# Patient Record
Sex: FEMALE | Race: OTHER | NOT HISPANIC OR LATINO | URBAN - METROPOLITAN AREA
[De-identification: names, ages, dates, MRNs, and addresses within clinical notes are randomized per-mention and may not be internally consistent; named-entity substitution may affect disease eponyms.]

---

## 2022-11-18 ENCOUNTER — INPATIENT (INPATIENT)
Facility: HOSPITAL | Age: 67
LOS: 10 days | Discharge: NURSING, REHAB OR LONG-TERM CARE | DRG: 23 | End: 2022-11-29
Attending: PSYCHIATRY & NEUROLOGY | Admitting: NEUROLOGICAL SURGERY
Payer: COMMERCIAL

## 2022-11-18 ENCOUNTER — EMERGENCY (EMERGENCY)
Facility: HOSPITAL | Age: 67
LOS: 1 days | Discharge: ANOTHER HOSPITAL | End: 2022-11-18
Attending: STUDENT IN AN ORGANIZED HEALTH CARE EDUCATION/TRAINING PROGRAM
Payer: MEDICAID

## 2022-11-18 VITALS
TEMPERATURE: 98 F | DIASTOLIC BLOOD PRESSURE: 81 MMHG | SYSTOLIC BLOOD PRESSURE: 180 MMHG | OXYGEN SATURATION: 98 % | HEART RATE: 90 BPM

## 2022-11-18 VITALS
TEMPERATURE: 98 F | OXYGEN SATURATION: 96 % | DIASTOLIC BLOOD PRESSURE: 67 MMHG | HEIGHT: 60 IN | WEIGHT: 116.84 LBS | HEART RATE: 62 BPM | RESPIRATION RATE: 16 BRPM | SYSTOLIC BLOOD PRESSURE: 211 MMHG

## 2022-11-18 VITALS
HEIGHT: 62 IN | TEMPERATURE: 99 F | HEART RATE: 100 BPM | SYSTOLIC BLOOD PRESSURE: 149 MMHG | RESPIRATION RATE: 16 BRPM | OXYGEN SATURATION: 97 % | DIASTOLIC BLOOD PRESSURE: 76 MMHG | WEIGHT: 115.96 LBS

## 2022-11-18 DIAGNOSIS — I61.9 NONTRAUMATIC INTRACEREBRAL HEMORRHAGE, UNSPECIFIED: ICD-10-CM

## 2022-11-18 LAB
ALBUMIN SERPL ELPH-MCNC: 4.1 G/DL — SIGNIFICANT CHANGE UP (ref 3.5–5)
ALBUMIN SERPL ELPH-MCNC: 4.8 G/DL — SIGNIFICANT CHANGE UP (ref 3.3–5)
ALP SERPL-CCNC: 80 U/L — SIGNIFICANT CHANGE UP (ref 40–120)
ALP SERPL-CCNC: 84 U/L — SIGNIFICANT CHANGE UP (ref 40–120)
ALT FLD-CCNC: 14 U/L — SIGNIFICANT CHANGE UP (ref 10–45)
ALT FLD-CCNC: 24 U/L DA — SIGNIFICANT CHANGE UP (ref 10–60)
AMPHET UR-MCNC: NEGATIVE — SIGNIFICANT CHANGE UP
ANION GAP SERPL CALC-SCNC: 12 MMOL/L — SIGNIFICANT CHANGE UP (ref 5–17)
ANION GAP SERPL CALC-SCNC: 13 MMOL/L — SIGNIFICANT CHANGE UP (ref 5–17)
ANION GAP SERPL CALC-SCNC: 9 MMOL/L — SIGNIFICANT CHANGE UP (ref 5–17)
APAP SERPL-MCNC: <2 UG/ML — LOW (ref 10–30)
APPEARANCE UR: CLEAR — SIGNIFICANT CHANGE UP
APTT BLD: 27.6 SEC — SIGNIFICANT CHANGE UP (ref 27.5–35.5)
AST SERPL-CCNC: 26 U/L — SIGNIFICANT CHANGE UP (ref 10–40)
AST SERPL-CCNC: 35 U/L — SIGNIFICANT CHANGE UP (ref 10–40)
BACTERIA # UR AUTO: ABNORMAL /HPF
BARBITURATES UR SCN-MCNC: NEGATIVE — SIGNIFICANT CHANGE UP
BASOPHILS # BLD AUTO: 0.01 K/UL — SIGNIFICANT CHANGE UP (ref 0–0.2)
BASOPHILS # BLD AUTO: 0.02 K/UL — SIGNIFICANT CHANGE UP (ref 0–0.2)
BASOPHILS NFR BLD AUTO: 0.1 % — SIGNIFICANT CHANGE UP (ref 0–2)
BASOPHILS NFR BLD AUTO: 0.3 % — SIGNIFICANT CHANGE UP (ref 0–2)
BENZODIAZ UR-MCNC: NEGATIVE — SIGNIFICANT CHANGE UP
BILIRUB SERPL-MCNC: 0.5 MG/DL — SIGNIFICANT CHANGE UP (ref 0.2–1.2)
BILIRUB SERPL-MCNC: 0.6 MG/DL — SIGNIFICANT CHANGE UP (ref 0.2–1.2)
BILIRUB UR-MCNC: NEGATIVE — SIGNIFICANT CHANGE UP
BLD GP AB SCN SERPL QL: NEGATIVE — SIGNIFICANT CHANGE UP
BUN SERPL-MCNC: 7 MG/DL — SIGNIFICANT CHANGE UP (ref 7–23)
BUN SERPL-MCNC: 7 MG/DL — SIGNIFICANT CHANGE UP (ref 7–23)
BUN SERPL-MCNC: 9 MG/DL — SIGNIFICANT CHANGE UP (ref 7–18)
CALCIUM SERPL-MCNC: 9.1 MG/DL — SIGNIFICANT CHANGE UP (ref 8.4–10.5)
CALCIUM SERPL-MCNC: 9.2 MG/DL — SIGNIFICANT CHANGE UP (ref 8.4–10.5)
CALCIUM SERPL-MCNC: 9.3 MG/DL — SIGNIFICANT CHANGE UP (ref 8.4–10.5)
CHLORIDE SERPL-SCNC: 101 MMOL/L — SIGNIFICANT CHANGE UP (ref 96–108)
CHLORIDE SERPL-SCNC: 104 MMOL/L — SIGNIFICANT CHANGE UP (ref 96–108)
CHLORIDE SERPL-SCNC: 98 MMOL/L — SIGNIFICANT CHANGE UP (ref 96–108)
CO2 SERPL-SCNC: 25 MMOL/L — SIGNIFICANT CHANGE UP (ref 22–31)
CO2 SERPL-SCNC: 27 MMOL/L — SIGNIFICANT CHANGE UP (ref 22–31)
CO2 SERPL-SCNC: 29 MMOL/L — SIGNIFICANT CHANGE UP (ref 22–31)
COCAINE METAB.OTHER UR-MCNC: NEGATIVE — SIGNIFICANT CHANGE UP
COLOR SPEC: YELLOW — SIGNIFICANT CHANGE UP
CREAT SERPL-MCNC: 0.55 MG/DL — SIGNIFICANT CHANGE UP (ref 0.5–1.3)
CREAT SERPL-MCNC: 0.57 MG/DL — SIGNIFICANT CHANGE UP (ref 0.5–1.3)
CREAT SERPL-MCNC: 0.74 MG/DL — SIGNIFICANT CHANGE UP (ref 0.5–1.3)
DIFF PNL FLD: ABNORMAL
EGFR: 101 ML/MIN/1.73M2 — SIGNIFICANT CHANGE UP
EGFR: 102 ML/MIN/1.73M2 — SIGNIFICANT CHANGE UP
EGFR: 90 ML/MIN/1.73M2 — SIGNIFICANT CHANGE UP
EOSINOPHIL # BLD AUTO: 0 K/UL — SIGNIFICANT CHANGE UP (ref 0–0.5)
EOSINOPHIL # BLD AUTO: 0 K/UL — SIGNIFICANT CHANGE UP (ref 0–0.5)
EOSINOPHIL NFR BLD AUTO: 0 % — SIGNIFICANT CHANGE UP (ref 0–6)
EOSINOPHIL NFR BLD AUTO: 0 % — SIGNIFICANT CHANGE UP (ref 0–6)
EPI CELLS # UR: ABNORMAL /HPF
ETHANOL SERPL-MCNC: <10 MG/DL — SIGNIFICANT CHANGE UP (ref 0–10)
ETHANOL SERPL-MCNC: <3 MG/DL — SIGNIFICANT CHANGE UP (ref 0–10)
GLUCOSE SERPL-MCNC: 110 MG/DL — HIGH (ref 70–99)
GLUCOSE SERPL-MCNC: 115 MG/DL — HIGH (ref 70–99)
GLUCOSE SERPL-MCNC: 123 MG/DL — HIGH (ref 70–99)
GLUCOSE UR QL: NEGATIVE — SIGNIFICANT CHANGE UP
HCT VFR BLD CALC: 44.5 % — SIGNIFICANT CHANGE UP (ref 34.5–45)
HCT VFR BLD CALC: 44.6 % — SIGNIFICANT CHANGE UP (ref 34.5–45)
HCT VFR BLD CALC: 44.8 % — SIGNIFICANT CHANGE UP (ref 34.5–45)
HGB BLD-MCNC: 15.2 G/DL — SIGNIFICANT CHANGE UP (ref 11.5–15.5)
HGB BLD-MCNC: 15.3 G/DL — SIGNIFICANT CHANGE UP (ref 11.5–15.5)
HGB BLD-MCNC: 15.6 G/DL — HIGH (ref 11.5–15.5)
IMM GRANULOCYTES NFR BLD AUTO: 0.3 % — SIGNIFICANT CHANGE UP (ref 0–0.9)
IMM GRANULOCYTES NFR BLD AUTO: 0.6 % — SIGNIFICANT CHANGE UP (ref 0–0.9)
INR BLD: 1.09 RATIO — SIGNIFICANT CHANGE UP (ref 0.88–1.16)
KETONES UR-MCNC: ABNORMAL
LEUKOCYTE ESTERASE UR-ACNC: NEGATIVE — SIGNIFICANT CHANGE UP
LYMPHOCYTES # BLD AUTO: 0.82 K/UL — LOW (ref 1–3.3)
LYMPHOCYTES # BLD AUTO: 1.27 K/UL — SIGNIFICANT CHANGE UP (ref 1–3.3)
LYMPHOCYTES # BLD AUTO: 11.8 % — LOW (ref 13–44)
LYMPHOCYTES # BLD AUTO: 20.3 % — SIGNIFICANT CHANGE UP (ref 13–44)
MAGNESIUM SERPL-MCNC: 2.3 MG/DL — SIGNIFICANT CHANGE UP (ref 1.6–2.6)
MCHC RBC-ENTMCNC: 31.9 PG — SIGNIFICANT CHANGE UP (ref 27–34)
MCHC RBC-ENTMCNC: 32.1 PG — SIGNIFICANT CHANGE UP (ref 27–34)
MCHC RBC-ENTMCNC: 32.6 PG — SIGNIFICANT CHANGE UP (ref 27–34)
MCHC RBC-ENTMCNC: 34.2 GM/DL — SIGNIFICANT CHANGE UP (ref 32–36)
MCHC RBC-ENTMCNC: 34.3 GM/DL — SIGNIFICANT CHANGE UP (ref 32–36)
MCHC RBC-ENTMCNC: 34.8 GM/DL — SIGNIFICANT CHANGE UP (ref 32–36)
MCV RBC AUTO: 93.1 FL — SIGNIFICANT CHANGE UP (ref 80–100)
MCV RBC AUTO: 93.5 FL — SIGNIFICANT CHANGE UP (ref 80–100)
MCV RBC AUTO: 93.9 FL — SIGNIFICANT CHANGE UP (ref 80–100)
METHADONE UR-MCNC: NEGATIVE — SIGNIFICANT CHANGE UP
MONOCYTES # BLD AUTO: 0.37 K/UL — SIGNIFICANT CHANGE UP (ref 0–0.9)
MONOCYTES # BLD AUTO: 0.37 K/UL — SIGNIFICANT CHANGE UP (ref 0–0.9)
MONOCYTES NFR BLD AUTO: 5.3 % — SIGNIFICANT CHANGE UP (ref 2–14)
MONOCYTES NFR BLD AUTO: 5.9 % — SIGNIFICANT CHANGE UP (ref 2–14)
NEUTROPHILS # BLD AUTO: 4.57 K/UL — SIGNIFICANT CHANGE UP (ref 1.8–7.4)
NEUTROPHILS # BLD AUTO: 5.7 K/UL — SIGNIFICANT CHANGE UP (ref 1.8–7.4)
NEUTROPHILS NFR BLD AUTO: 72.9 % — SIGNIFICANT CHANGE UP (ref 43–77)
NEUTROPHILS NFR BLD AUTO: 82.5 % — HIGH (ref 43–77)
NITRITE UR-MCNC: NEGATIVE — SIGNIFICANT CHANGE UP
NRBC # BLD: 0 /100 WBCS — SIGNIFICANT CHANGE UP (ref 0–0)
NT-PROBNP SERPL-SCNC: 375 PG/ML — HIGH (ref 0–125)
OPIATES UR-MCNC: NEGATIVE — SIGNIFICANT CHANGE UP
PCP SPEC-MCNC: SIGNIFICANT CHANGE UP
PCP UR-MCNC: NEGATIVE — SIGNIFICANT CHANGE UP
PH UR: 6.5 — SIGNIFICANT CHANGE UP (ref 5–8)
PHOSPHATE SERPL-MCNC: 3 MG/DL — SIGNIFICANT CHANGE UP (ref 2.5–4.5)
PLATELET # BLD AUTO: 212 K/UL — SIGNIFICANT CHANGE UP (ref 150–400)
PLATELET # BLD AUTO: 219 K/UL — SIGNIFICANT CHANGE UP (ref 150–400)
PLATELET # BLD AUTO: 223 K/UL — SIGNIFICANT CHANGE UP (ref 150–400)
POTASSIUM SERPL-MCNC: 3.5 MMOL/L — SIGNIFICANT CHANGE UP (ref 3.5–5.3)
POTASSIUM SERPL-MCNC: 3.6 MMOL/L — SIGNIFICANT CHANGE UP (ref 3.5–5.3)
POTASSIUM SERPL-MCNC: 4.2 MMOL/L — SIGNIFICANT CHANGE UP (ref 3.5–5.3)
POTASSIUM SERPL-SCNC: 3.5 MMOL/L — SIGNIFICANT CHANGE UP (ref 3.5–5.3)
POTASSIUM SERPL-SCNC: 3.6 MMOL/L — SIGNIFICANT CHANGE UP (ref 3.5–5.3)
POTASSIUM SERPL-SCNC: 4.2 MMOL/L — SIGNIFICANT CHANGE UP (ref 3.5–5.3)
PROT SERPL-MCNC: 8.1 G/DL — SIGNIFICANT CHANGE UP (ref 6–8.3)
PROT SERPL-MCNC: 8.1 G/DL — SIGNIFICANT CHANGE UP (ref 6–8.3)
PROT UR-MCNC: 15
PROTHROM AB SERPL-ACNC: 12.5 SEC — SIGNIFICANT CHANGE UP (ref 10.5–13.4)
RBC # BLD: 4.74 M/UL — SIGNIFICANT CHANGE UP (ref 3.8–5.2)
RBC # BLD: 4.79 M/UL — SIGNIFICANT CHANGE UP (ref 3.8–5.2)
RBC # BLD: 4.79 M/UL — SIGNIFICANT CHANGE UP (ref 3.8–5.2)
RBC # FLD: 11.6 % — SIGNIFICANT CHANGE UP (ref 10.3–14.5)
RBC # FLD: 11.7 % — SIGNIFICANT CHANGE UP (ref 10.3–14.5)
RBC # FLD: 11.8 % — SIGNIFICANT CHANGE UP (ref 10.3–14.5)
RBC CASTS # UR COMP ASSIST: SIGNIFICANT CHANGE UP /HPF (ref 0–2)
RH IG SCN BLD-IMP: POSITIVE — SIGNIFICANT CHANGE UP
SALICYLATES SERPL-MCNC: <1.7 MG/DL — LOW (ref 2.8–20)
SARS-COV-2 RNA SPEC QL NAA+PROBE: SIGNIFICANT CHANGE UP
SODIUM SERPL-SCNC: 136 MMOL/L — SIGNIFICANT CHANGE UP (ref 135–145)
SODIUM SERPL-SCNC: 139 MMOL/L — SIGNIFICANT CHANGE UP (ref 135–145)
SODIUM SERPL-SCNC: 143 MMOL/L — SIGNIFICANT CHANGE UP (ref 135–145)
SP GR SPEC: 1.01 — SIGNIFICANT CHANGE UP (ref 1.01–1.02)
THC UR QL: NEGATIVE — SIGNIFICANT CHANGE UP
TROPONIN I, HIGH SENSITIVITY RESULT: 58.5 NG/L — HIGH
TSH SERPL-MCNC: 2.87 UU/ML — SIGNIFICANT CHANGE UP (ref 0.34–4.82)
UROBILINOGEN FLD QL: NEGATIVE — SIGNIFICANT CHANGE UP
WBC # BLD: 6.27 K/UL — SIGNIFICANT CHANGE UP (ref 3.8–10.5)
WBC # BLD: 6.92 K/UL — SIGNIFICANT CHANGE UP (ref 3.8–10.5)
WBC # BLD: 7.52 K/UL — SIGNIFICANT CHANGE UP (ref 3.8–10.5)
WBC # FLD AUTO: 6.27 K/UL — SIGNIFICANT CHANGE UP (ref 3.8–10.5)
WBC # FLD AUTO: 6.92 K/UL — SIGNIFICANT CHANGE UP (ref 3.8–10.5)
WBC # FLD AUTO: 7.52 K/UL — SIGNIFICANT CHANGE UP (ref 3.8–10.5)
WBC UR QL: SIGNIFICANT CHANGE UP /HPF (ref 0–5)

## 2022-11-18 PROCEDURE — 93005 ELECTROCARDIOGRAM TRACING: CPT

## 2022-11-18 PROCEDURE — 70450 CT HEAD/BRAIN W/O DYE: CPT | Mod: 26,59

## 2022-11-18 PROCEDURE — 70498 CT ANGIOGRAPHY NECK: CPT | Mod: 26

## 2022-11-18 PROCEDURE — 99291 CRITICAL CARE FIRST HOUR: CPT | Mod: 25

## 2022-11-18 PROCEDURE — 83880 ASSAY OF NATRIURETIC PEPTIDE: CPT

## 2022-11-18 PROCEDURE — 87635 SARS-COV-2 COVID-19 AMP PRB: CPT

## 2022-11-18 PROCEDURE — 80053 COMPREHEN METABOLIC PANEL: CPT

## 2022-11-18 PROCEDURE — 81001 URINALYSIS AUTO W/SCOPE: CPT

## 2022-11-18 PROCEDURE — 70496 CT ANGIOGRAPHY HEAD: CPT | Mod: 26

## 2022-11-18 PROCEDURE — 96361 HYDRATE IV INFUSION ADD-ON: CPT

## 2022-11-18 PROCEDURE — 99292 CRITICAL CARE ADDL 30 MIN: CPT

## 2022-11-18 PROCEDURE — 36415 COLL VENOUS BLD VENIPUNCTURE: CPT

## 2022-11-18 PROCEDURE — 93010 ELECTROCARDIOGRAM REPORT: CPT

## 2022-11-18 PROCEDURE — 84443 ASSAY THYROID STIM HORMONE: CPT

## 2022-11-18 PROCEDURE — 99291 CRITICAL CARE FIRST HOUR: CPT

## 2022-11-18 PROCEDURE — 80307 DRUG TEST PRSMV CHEM ANLYZR: CPT

## 2022-11-18 PROCEDURE — 71045 X-RAY EXAM CHEST 1 VIEW: CPT | Mod: 26

## 2022-11-18 PROCEDURE — 70450 CT HEAD/BRAIN W/O DYE: CPT | Mod: 26,MA

## 2022-11-18 PROCEDURE — 96374 THER/PROPH/DIAG INJ IV PUSH: CPT

## 2022-11-18 PROCEDURE — 71045 X-RAY EXAM CHEST 1 VIEW: CPT

## 2022-11-18 PROCEDURE — 84484 ASSAY OF TROPONIN QUANT: CPT

## 2022-11-18 PROCEDURE — 85025 COMPLETE CBC W/AUTO DIFF WBC: CPT

## 2022-11-18 PROCEDURE — 87086 URINE CULTURE/COLONY COUNT: CPT

## 2022-11-18 PROCEDURE — 70450 CT HEAD/BRAIN W/O DYE: CPT | Mod: MA

## 2022-11-18 RX ORDER — CEFAZOLIN SODIUM 1 G
2000 VIAL (EA) INJECTION ONCE
Refills: 0 | Status: COMPLETED | OUTPATIENT
Start: 2022-11-18 | End: 2022-11-18

## 2022-11-18 RX ORDER — SODIUM CHLORIDE 9 MG/ML
500 INJECTION INTRAMUSCULAR; INTRAVENOUS; SUBCUTANEOUS ONCE
Refills: 0 | Status: COMPLETED | OUTPATIENT
Start: 2022-11-18 | End: 2022-11-18

## 2022-11-18 RX ORDER — FENTANYL CITRATE 50 UG/ML
100 INJECTION INTRAVENOUS ONCE
Refills: 0 | Status: COMPLETED | OUTPATIENT
Start: 2022-11-18 | End: 2022-11-18

## 2022-11-18 RX ORDER — POTASSIUM CHLORIDE 20 MEQ
10 PACKET (EA) ORAL
Refills: 0 | Status: COMPLETED | OUTPATIENT
Start: 2022-11-18 | End: 2022-11-19

## 2022-11-18 RX ORDER — LEVETIRACETAM 250 MG/1
500 TABLET, FILM COATED ORAL ONCE
Refills: 0 | Status: COMPLETED | OUTPATIENT
Start: 2022-11-18 | End: 2022-11-18

## 2022-11-18 RX ORDER — DEXMEDETOMIDINE HYDROCHLORIDE IN 0.9% SODIUM CHLORIDE 4 UG/ML
0.2 INJECTION INTRAVENOUS
Qty: 200 | Refills: 0 | Status: DISCONTINUED | OUTPATIENT
Start: 2022-11-18 | End: 2022-11-19

## 2022-11-18 RX ORDER — FENTANYL CITRATE 50 UG/ML
25 INJECTION INTRAVENOUS ONCE
Refills: 0 | Status: DISCONTINUED | OUTPATIENT
Start: 2022-11-18 | End: 2022-11-18

## 2022-11-18 RX ORDER — MIDAZOLAM HYDROCHLORIDE 1 MG/ML
2 INJECTION, SOLUTION INTRAMUSCULAR; INTRAVENOUS ONCE
Refills: 0 | Status: DISCONTINUED | OUTPATIENT
Start: 2022-11-18 | End: 2022-11-18

## 2022-11-18 RX ORDER — FENTANYL CITRATE 50 UG/ML
50 INJECTION INTRAVENOUS ONCE
Refills: 0 | Status: COMPLETED | OUTPATIENT
Start: 2022-11-18 | End: 2022-11-18

## 2022-11-18 RX ORDER — FENTANYL CITRATE 50 UG/ML
100 INJECTION INTRAVENOUS ONCE
Refills: 0 | Status: DISCONTINUED | OUTPATIENT
Start: 2022-11-18 | End: 2022-11-18

## 2022-11-18 RX ORDER — NICARDIPINE HYDROCHLORIDE 30 MG/1
5 CAPSULE, EXTENDED RELEASE ORAL
Qty: 40 | Refills: 0 | Status: DISCONTINUED | OUTPATIENT
Start: 2022-11-18 | End: 2022-11-21

## 2022-11-18 RX ORDER — SODIUM CHLORIDE 9 MG/ML
1000 INJECTION INTRAMUSCULAR; INTRAVENOUS; SUBCUTANEOUS
Refills: 0 | Status: DISCONTINUED | OUTPATIENT
Start: 2022-11-18 | End: 2022-11-18

## 2022-11-18 RX ORDER — SODIUM CHLORIDE 9 MG/ML
1000 INJECTION INTRAMUSCULAR; INTRAVENOUS; SUBCUTANEOUS
Refills: 0 | Status: DISCONTINUED | OUTPATIENT
Start: 2022-11-18 | End: 2022-11-19

## 2022-11-18 RX ORDER — NOREPINEPHRINE BITARTRATE/D5W 8 MG/250ML
0.03 PLASTIC BAG, INJECTION (ML) INTRAVENOUS
Qty: 8 | Refills: 0 | Status: DISCONTINUED | OUTPATIENT
Start: 2022-11-18 | End: 2022-11-19

## 2022-11-18 RX ORDER — SODIUM CHLORIDE 9 MG/ML
1000 INJECTION INTRAMUSCULAR; INTRAVENOUS; SUBCUTANEOUS ONCE
Refills: 0 | Status: COMPLETED | OUTPATIENT
Start: 2022-11-18 | End: 2022-11-18

## 2022-11-18 RX ORDER — CHLORHEXIDINE GLUCONATE 213 G/1000ML
1 SOLUTION TOPICAL
Refills: 0 | Status: DISCONTINUED | OUTPATIENT
Start: 2022-11-18 | End: 2022-11-25

## 2022-11-18 RX ADMIN — FENTANYL CITRATE 100 MICROGRAM(S): 50 INJECTION INTRAVENOUS at 19:24

## 2022-11-18 RX ADMIN — SODIUM CHLORIDE 1000 MILLILITER(S): 9 INJECTION INTRAMUSCULAR; INTRAVENOUS; SUBCUTANEOUS at 14:15

## 2022-11-18 RX ADMIN — NICARDIPINE HYDROCHLORIDE 25 MG/HR: 30 CAPSULE, EXTENDED RELEASE ORAL at 16:47

## 2022-11-18 RX ADMIN — LEVETIRACETAM 420 MILLIGRAM(S): 250 TABLET, FILM COATED ORAL at 16:50

## 2022-11-18 RX ADMIN — MIDAZOLAM HYDROCHLORIDE 2 MILLIGRAM(S): 1 INJECTION, SOLUTION INTRAMUSCULAR; INTRAVENOUS at 20:10

## 2022-11-18 RX ADMIN — FENTANYL CITRATE 25 MICROGRAM(S): 50 INJECTION INTRAVENOUS at 21:00

## 2022-11-18 RX ADMIN — NICARDIPINE HYDROCHLORIDE 25 MG/HR: 30 CAPSULE, EXTENDED RELEASE ORAL at 18:40

## 2022-11-18 RX ADMIN — NICARDIPINE HYDROCHLORIDE 25 MG/HR: 30 CAPSULE, EXTENDED RELEASE ORAL at 19:23

## 2022-11-18 RX ADMIN — SODIUM CHLORIDE 75 MILLILITER(S): 9 INJECTION INTRAMUSCULAR; INTRAVENOUS; SUBCUTANEOUS at 23:18

## 2022-11-18 RX ADMIN — Medication 100 MILLIGRAM(S): at 19:23

## 2022-11-18 RX ADMIN — LEVETIRACETAM 500 MILLIGRAM(S): 250 TABLET, FILM COATED ORAL at 17:05

## 2022-11-18 RX ADMIN — SODIUM CHLORIDE 1000 MILLILITER(S): 9 INJECTION INTRAMUSCULAR; INTRAVENOUS; SUBCUTANEOUS at 15:15

## 2022-11-18 RX ADMIN — Medication 2 MILLIGRAM(S): at 22:15

## 2022-11-18 RX ADMIN — SODIUM CHLORIDE 1000 MILLILITER(S): 9 INJECTION INTRAMUSCULAR; INTRAVENOUS; SUBCUTANEOUS at 22:40

## 2022-11-18 RX ADMIN — MIDAZOLAM HYDROCHLORIDE 2 MILLIGRAM(S): 1 INJECTION, SOLUTION INTRAMUSCULAR; INTRAVENOUS at 19:40

## 2022-11-18 NOTE — ED ADULT NURSE NOTE - OBJECTIVE STATEMENT
67y F bibEMS transferred from [facility]. Pt daughter at bedside. Pt A&Ox1. Pt daughter reports has chronic etoh history. Daughter reports pt was drinking Wednesday and sustained an unwitnessed fall yesterday. Pt daughter reports pt's altered mental status. Pt appears confused and [place] the [place]" [place]. Pt BP at [place] systolic in the 200s. Pt started on cardene drip. Pt presents to [facility] ED with cardene infusing 5mg/hr. PMH includes chronic etoh, HTN, HLD. Denies any complaints at this time. Comfort and safety maintained. Will cont to monitor.

## 2022-11-18 NOTE — ED ADULT NURSE NOTE - NSIMPLEMENTINTERV_GEN_ALL_ED
Implemented All Fall Risk Interventions:  [place] to call system. Call bell, personal items and telephone within reach. Instruct patient to call for assistance. Room bathroom lighting operational. Non-slip footwear when patient is off stretcher. Physically safe environment: no spills, clutter or unnecessary equipment. Stretcher in lowest position, wheels locked, appropriate side rails in place. Provide visual cue, wrist band, yellow gown, etc. Monitor gait and stability. Monitor for mental status changes and reorient to person, place, and time. Review medications for side effects contributing to fall risk. Reinforce activity limits and safety measures with patient and family.

## 2022-11-18 NOTE — ED PROVIDER NOTE - OBJECTIVE STATEMENT
67 year old female with PMHX of hypertension and hyperlipidemia brought into the ED by her daughter for evaluation of AMS since yesterday morning. Patient denies any symptoms, and reports that she is here for a checkup. Daughter reports that yesterday patient woke up and felt some dizziness and had one episode of vomiting. Patient then reportedly had a second episode of vomiting later in the day, and had a decrease in po intake. Per daughter, patient's  reported that patient sustained a fall yesterday although he is unsure of the mechanism of the fall. Patient describes her dizziness as an intermittent lightheadedness which lasts for a few minutes and is usually brought on by positional change. Daughter also reports that patient was unable to walk yesterday. Daughter denies any recent illness. Daughter additionally notes that within the last few years patient was thought to possibly have early onset Alzheimer's, however daughter reports that patient's mental status has been different from her baseline since yesterday morning. Patient and daughter deny all other acute complaints. NKDA.

## 2022-11-18 NOTE — ED PROVIDER NOTE - QTC
Impression: Tear film insufficiency of bilateral lacrimal glands - s/p LASIK OU Plan: Ed pt on likely etiology of EDUIN vs corneal erosion causing irritation few weeks ago. Recommend ATs before bed to prevent recurrence, ointment as needed. qtc 481 std in inf leads

## 2022-11-18 NOTE — ED ADULT NURSE REASSESSMENT NOTE - NS ED NURSE REASSESS COMMENT FT1
Pt taken to NSCU bed 01 w/ MD RAZO, ABIGAIL gabriel, RN, cardiac med box, pt on cardiac monitor, 2LNC, RN Emily at bedside to receive pt

## 2022-11-18 NOTE — H&P ADULT - HISTORY OF PRESENT ILLNESS
67F Hx HTN HLD, presented to  w/dizziness & AMS since AM of 11/17. Patient reportedly early dementia (AOx3 bl). No AC/AP. CTH w/cerebellar IPH with IVH in 4th/3rd vent, ventriculomegaly, decreased but patent prepontine cistern. Plt 219

## 2022-11-18 NOTE — ED ADULT NURSE REASSESSMENT NOTE - NS ED NURSE REASSESS COMMENT FT1
neuro MD Naranjo at bedside to place right side EVD. per MD Naranjo EVD is at 6.5 at the skin with adequate perfusion. patient to be taken to CT and NSCU

## 2022-11-18 NOTE — H&P ADULT - ATTENDING COMMENTS
Pt seen and examined.  Family at bedside  H & P as above  s/p Rt frontal EVD  Awke alert. follows commands well  some confusion as per family  ALARCON well    Pt is 48hrs s/p left cerebellar hemisphere hemorrhage with extesnion into ventricular syustem.  Stable exam    Cont' present management as per ICU and EVD

## 2022-11-18 NOTE — ED PROVIDER NOTE - PROGRESS NOTE DETAILS
nsg consulted nsg consulted recommending admission SBP goal 100-160, pt currently on nicardipine will continue

## 2022-11-18 NOTE — ED PROVIDER NOTE - CLINICAL SUMMARY MEDICAL DECISION MAKING FREE TEXT BOX
here w/ intraparenchymal hemorrhage, and intraventricular hemorrhage seen by NSG may require evd vs hemicrani plan for NSICU admission, SBP goals 120-160

## 2022-11-18 NOTE — ED PROVIDER NOTE - OBJECTIVE STATEMENT
67 F w/ hx of HTN, HLD, on asa but unknown if pt compliant w/ home medications, pt had a fall yesterday unable to provide history, pt states she was trying to catch a flight, she is accompanied by her daughter, who reports pt had a fall yesterday and hit the front of her head, was witnessed by  pt w/ no cp, no sob, +vomiting, pt brought to [place] and then was brought to [facility] for nsg management for acute parenchyma hemorrhage

## 2022-11-18 NOTE — PROGRESS NOTE ADULT - ASSESSMENT
ASSESSMENT/PLAN:    NEURO:  Activity: [] mobilize as tolerated [] Bedrest [] PT [] OT [] PMNR    PULM:    CV:  SBP goal    RENAL:  Fluids:    GI:  Diet:  GI prophylaxis [] not indicated [] PPI [] other:  Bowel regimen [] colace [] senna [] other:    ENDO:   Goal euglycemia (-180)    HEME/ONC:  VTE prophylaxis: [] SCDs [] chemoprophylaxis [] hold chemoprophylaxis due to: [] high risk of DVT/PE on admission due to:    ID:    MISC:    SOCIAL/FAMILY:  [] awaiting [] updated at bedside [] family meeting    CODE STATUS:  [] Full Code [] DNR [] DNI [] Palliative/Comfort Care    DISPOSITION:  [] ICU [] Stroke Unit [] Floor [] EMU [] RCU [] PCU    [] Patient is at high risk of neurologic deterioration/death due to:     Time seen:  Time spent: ___ [] critical care minutes    Contact: [phone] ASSESSMENT/PLAN: 66 y/o female presenting with cerebellar ICH (score 2). S/P EVD placement emergently.    NEURO:  Vitor Q1H  CTA H&N- with no evidence of vascular malformation per radiology  MRi brain with and w/o contrast  EVD at 10 and open, ICP <10  CIWA protocol as the patient may be withdrawing from EtOH- last drink was 11/16  Stroke work up - stroke neurology on board  Activity: [x] mobilize as tolerated [] Bedrest [] PT [] OT [] PMNR    PULM:  Saturating on 2 L of NC    CV:  SBP goal 100-150 mmHg  History of hypertension and dyslipidemia  Will obtain TTE Lipid levels    RENAL:  Fluids: IVF at 50 cc/hour  Na goal 135-145, Na 139    GI:  Diet: NPO until dysphagia   GI prophylaxis [] not indicated [x] PPI [] other:  Bowel regimen [x] Miralax [x] senna [] other:    ENDO:   Goal euglycemia (-180)  Will obtain A1c     HEME/ONC:  H/H stable  VTE prophylaxis: [x] SCDs [] chemoprophylaxis [x] hold chemoprophylaxis due to: recent heme [] high risk of DVT/PE on admission due to:    ID:  Afebrile, no leukocytosis    MISC:    SOCIAL/FAMILY:  [] awaiting [x] updated at bedside [] family meeting    CODE STATUS:  [x] Full Code [] DNR [] DNI [] Palliative/Comfort Care    DISPOSITION:  [x] ICU [] Stroke Unit [] Floor [] EMU [] RCU [] PCU    [x] Patient is at high risk of neurologic deterioration/death due to: ICH expansion, herniation      ASSESSMENT/PLAN: 66 y/o female presenting with cerebellar ICH (score 2). S/P EVD placement emergently.    NEURO:  Vitor Q1H  CTA H&N- with no evidence of vascular malformation per radiology  MRi brain with and w/o contrast  Crani watch  EVD at 10 and open, ICP <10  CIWA protocol as the patient may be withdrawing from EtOH- last drink was 11/16  Stroke work up - stroke neurology on board  Activity: [x] mobilize as tolerated [] Bedrest [] PT [] OT [] PMNR    PULM:  Saturating on 2 L of NC    CV:  SBP goal 100-150 mmHg  History of hypertension and dyslipidemia  Will obtain TTE Lipid levels    RENAL:  Fluids: IVF at 50 cc/hour  Na goal 135-145, Na 139    GI:  Diet: NPO until dysphagia   GI prophylaxis [] not indicated [x] PPI [] other:  Bowel regimen [x] Miralax [x] senna [] other:    ENDO:   Goal euglycemia (-180)  Will obtain A1c     HEME/ONC:  H/H stable  VTE prophylaxis: [x] SCDs [] chemoprophylaxis [x] hold chemoprophylaxis due to: recent heme [] high risk of DVT/PE on admission due to:    ID:  Afebrile, no leukocytosis    MISC:    SOCIAL/FAMILY:  [] awaiting [x] updated at bedside [] family meeting    CODE STATUS:  [x] Full Code [] DNR [] DNI [] Palliative/Comfort Care    DISPOSITION:  [x] ICU [] Stroke Unit [] Floor [] EMU [] RCU [] PCU    [x] Patient is at high risk of neurologic deterioration/death due to: ICH expansion, herniation

## 2022-11-18 NOTE — ED PROVIDER NOTE - PHYSICAL EXAMINATION
Head is normocephalic and atraumatic. No head tenderness or skull depression on palpation. No temporal cord-like sensation, increased warmth or tenderness. Pt is alert and oriented to name and place but unable to tell date, able to follow commands. No facial asymmetry. Pupils 5 mm equally round with PERRL, no nystagmus, EOM intact. Cranial nerves III-XII grossly intact. Coordination nose-to-finger intact. All limbs equally strong bilaterally 5/5. No pronator drift. No numbness or tingling sensation.  No spinal/paraspinal tenderness. Neck is non-tender, supple with full range of motion. No nuchal rigidity. No cerebral deficits.

## 2022-11-18 NOTE — PROGRESS NOTE ADULT - SUBJECTIVE AND OBJECTIVE BOX
SUMMARY: 66 y/o female with history of hypertension, dyslipidemia on ASA presents after a fall. Patient unable to provide history. Patient apparently had a witnessed fall with trauma to the front of the head.     HOSPITAL COURSE: Patient with Cerebellar ICH, ICH score 2.     24 HOUR EVENTS: Patient s/p EVD placement emergently in the ER and transferred to neuro icu for further management.     ADMISSION SCORES:   GCS: HH: MF: NIHSS: ICH Score: 2    SEDATION SCORES:  RASS: CAM-ICU:     REVIEW OF SYSTEMS: REVIEW OF SYSTEMS: [x] Unable to Assess due to neurologic exam   [ ] All ROS addressed below are non-contributory, except:  Neuro: [ ] Headache [ ] Back pain [ ] Numbness [ ] Weakness [ ] Ataxia [ ] Dizziness [ ] Aphasia [ ] Dysarthria [ ] Visual disturbance  Resp: [ ] Shortness of breath/dyspnea [ ] Orthopnea [ ] Cough  CV: [ ] Chest pain [ ] Palpitation [ ] Lightheadedness [ ] Syncope  Renal: [ ] Thirst [ ] Edema  GI: [ ] Nausea [ ] Emesis [ ] Abdominal pain [ ] Constipation [ ] Diarrhea  Hem: [ ] Hematemesis [ ] bBright red blood per rectum  ID: [ ] Fever [ ] Chills [ ] Dysuria  ENT: [ ] Rhinorrhea    ALLERGIES: Allergies    Allergy Status Unknown    Intolerances        VITALS/DATA/ORDERS: [] Reviewed    DEVICES:   [] Restraints [] PIVs [] ET tube [] central line [] PICC [] arterial line [] emmanuel [] NGT/OGT [] EVD [] LD [] TERESA/HMV [] LiCOX [] ICP monitor [] Trach [] PEG [] Chest Tube [] other:    EXAMINATION:  General: No acute distress  HEENT: Anicteric sclerae  Cardiac: [id number]  Lungs: Clear  Abdomen: Soft, non-tender, +BS  Extremities: No c/c/e  Skin/Incision Site: Clean, dry and intact  Neurologic: Awake, alert, fully oriented, follows commands, PERRL, VFFtc, EOMI, face symmetric, tongue midline, no drift, full strength SUMMARY: 68 y/o female with history of hypertension, dyslipidemia on ASA, new onset dementia, alcohol abuse (1/2-1 bottle of wine a day) presents after a fall. Patient unable to provide history. Patient apparently had an unwitnessed fall with trauma to the front of the head on 11/17. The next day the patient was found to be confused, at that time the patient was taken to [facility] where she was found to have an ICH and transferred [facility] for further management.    HOSPITAL COURSE: Patient with Cerebellar ICH, ICH score 2.     24 HOUR EVENTS: Patient s/p EVD placement emergently in the ER and transferred to neuro icu for further management. Patient s/p CTA head and neck to rule out vascular malformation. Patient was also placed on a cardene gtt in the ER.     ADMISSION SCORES:   GCS: 14 CH Score: 2    REVIEW OF SYSTEMS: REVIEW OF SYSTEMS: [x] Unable to Assess due to neurologic exam   [ ] All ROS addressed below are non-contributory, except:  Neuro: [ ] Headache [ ] Back pain [ ] Numbness [ ] Weakness [ ] Ataxia [ ] Dizziness [ ] Aphasia [ ] Dysarthria [ ] Visual disturbance  Resp: [ ] Shortness of breath/dyspnea [ ] Orthopnea [ ] Cough  CV: [ ] Chest pain [ ] Palpitation [ ] Lightheadedness [ ] Syncope  Renal: [ ] Thirst [ ] Edema  GI: [ ] Nausea [ ] Emesis [ ] Abdominal pain [ ] Constipation [ ] Diarrhea  Hem: [ ] Hematemesis [ ] Bright red blood per rectum  ID: [ ] Fever [ ] Chills [ ] Dysuria  ENT: [ ] Rhinorrhea    ALLERGIES: Allergies    Allergy Status Unknown    Intolerances    VITALS/DATA/ORDERS: [x] Reviewed    DEVICES:   [] Restraints [] PIVs [] ET tube [] central line [] PICC [] arterial line [] emmanuel [] NGT/OGT [] EVD [] LD [] TERESA/HMV [] LiCOX [] ICP monitor [] Trach [] PEG [] Chest Tube [] other:    EXAMINATION:  General: Agitated, non cooperative   HEENT: Anicteric sclerae  Cardiac: [id number]  Lungs: Clear  Abdomen: Soft, non-tender, +BS  Extremities: Tremulous   Skin/Incision Site: Clean, dry and intact  Neurologic: Awake, alert, oriented to self, not following commands, moving all extremities strongly.

## 2022-11-18 NOTE — ED PROVIDER NOTE - CLINICAL SUMMARY MEDICAL DECISION MAKING FREE TEXT BOX
67 year old female presents to the ED with vague symptoms of AMA since yesterday morning associated with dizziness and vomiting. Patient is alert and oriented x2, however is otherwise grossly neurologically intact. Unsteady gait during ambulation on exam. Will perform CT to assess for acute intracranial injury, check labs and urine, perform chest x-ray to assess for infection, administer IV fluids, and likely admit.

## 2022-11-18 NOTE — H&P ADULT - ASSESSMENT
Billyanastacio Diana  67F Hx HTN HLD, presented to  w/dizziness & AMS since AM of 11/17. Patient reportedly early dementia (AOx3 bl). No AC/AP. CTH w/cerebellar IPH with IVH in 4th/3rd vent, ventriculomegaly, decreased but patent prepontine cistern. Plt 219  Exam: AOx1, PERRL, EOMI, no facial, no drift, ALARCON 5/5, mild L dysmetria   -Coags  -EVD for decline in exam  -SOC watch  --160  -adm NSCU under Dr. Vines with [facility] neurochecks

## 2022-11-18 NOTE — ED PROVIDER NOTE - ATTENDING CONTRIBUTION TO CARE
67 F w/ hx of HTN, HLD, on asa but unknown if pt compliant w/ home medications, pt had a fall yesterday unable to provide history, pt states she was trying to catch a flight, she is accompanied by her daughter, who reports pt had a fall yesterday and hit the front of her head, was witnessed by  pt w/ no cp, no sob, +vomiting, pt brought to [place] and then was brought to [facility] for nsg management for acute parenchyma hemorrhage. on exam, Const: no acute distress, Well-developed, Eyes: no conjunctival injection and no scleral icterus ENMT: Moist mucus membranes, CVS: +S1/S2, radial pulse 2+ bilaterally tachycardic  RESP: Unlabored respiratory effort, Clear to auscultation bilaterally Back: bruising on the L upper shoulder GI: Nontender/Nondistended soft abdomen, no CVA tenderness MSK: Extremities w/o deformity or ttp, Psych: Awake, Alert, & Orientedx3;  Appropriate mood and affect, cooperative Neuro: 5/5 bilateral upper and lower extremity strength, intact sensation in the bilateral arms and legs to light touch, normal finger to nose bilaterally w/ no dysmetria, EOMI, no facial droop,   Plan for nsg consult   CTH performed at 4PM w/ Acute parenchymal hemorrhage in the left cerebellum measuring 2.6 x 2.3 x 2.3 cm. Intraventricular hemorrhage in the fourth, third, and bilateral lateral ventricles. Dilatation of bilateral temporal horns suggesting hydrocephalus. No midline shift or effacement of basal cisterns.  Keppra 500 mg given at 430 PM  Likely TBA for hemicrani watch, possible NSICU, 67 F  transfer from [facility] CTH performed at 4PM w/ Acute parenchymal hemorrhage in the left cerebellum measuring 2.6 x 2.3 x 2.3 cm. Intraventricular hemorrhage in the fourth, third, and bilateral lateral ventricles. Dilatation of bilateral temporal horns suggesting hydrocephalus. No midline shift or effacement of basal cisterns.  Keppra 500 mg given at 430 PM  Likely TBA for hemicrani watch, possible NSICU,

## 2022-11-18 NOTE — ED ADULT TRIAGE NOTE - CHIEF COMPLAINT QUOTE
C/o dizziness on/off x 2 days, vomiting since yesterday, couldn't get OOB by herself yesterday morning at 7am and was unable to walk by self,  feels pressure  in her head and was confused y/d.

## 2022-11-18 NOTE — ED PROVIDER NOTE - PHYSICAL EXAMINATION
Const: no acute distress, Well-developed, Eyes: no conjunctival injection and no scleral icterus ENMT: Moist mucus membranes, CVS: +S1/S2, radial pulse 2+ bilaterally tachycardic  RESP: Unlabored respiratory effort, Clear to auscultation bilaterally Back: bruising on the L upper shoulder GI: Nontender/Nondistended soft abdomen, no CVA tenderness MSK: Extremities w/o deformity or ttp, Psych: Awake, Alert, & Orientedx3;  Appropriate mood and affect, cooperative Neuro: 5/5 bilateral upper and lower extremity strength, intact sensation in the bilateral arms and legs to light touch, normal finger to nose bilaterally w/ no dysmetria, EOMI, no facial droop,

## 2022-11-18 NOTE — ED PROVIDER NOTE - PROGRESS NOTE DETAILS
CTH with cerebellar bleed.  Spoke to NSGY, will transfer to [facility]. Keppra and Cardene initiated - BP ok but cardene hanging at bedside in case of BP spike.

## 2022-11-19 LAB
A1C WITH ESTIMATED AVERAGE GLUCOSE RESULT: 5.6 % — SIGNIFICANT CHANGE UP (ref 4–5.6)
ALBUMIN SERPL ELPH-MCNC: 4 G/DL — SIGNIFICANT CHANGE UP (ref 3.3–5)
ALP SERPL-CCNC: 66 U/L — SIGNIFICANT CHANGE UP (ref 40–120)
ALT FLD-CCNC: 13 U/L — SIGNIFICANT CHANGE UP (ref 10–45)
AMPHET UR-MCNC: NEGATIVE — SIGNIFICANT CHANGE UP
ANION GAP SERPL CALC-SCNC: 9 MMOL/L — SIGNIFICANT CHANGE UP (ref 5–17)
AST SERPL-CCNC: 20 U/L — SIGNIFICANT CHANGE UP (ref 10–40)
BARBITURATES UR SCN-MCNC: NEGATIVE — SIGNIFICANT CHANGE UP
BENZODIAZ UR-MCNC: POSITIVE
BILIRUB DIRECT SERPL-MCNC: 0.1 MG/DL — SIGNIFICANT CHANGE UP (ref 0–0.3)
BILIRUB INDIRECT FLD-MCNC: 0.4 MG/DL — SIGNIFICANT CHANGE UP (ref 0.2–1)
BILIRUB SERPL-MCNC: 0.5 MG/DL — SIGNIFICANT CHANGE UP (ref 0.2–1.2)
BUN SERPL-MCNC: 7 MG/DL — SIGNIFICANT CHANGE UP (ref 7–23)
CALCIUM SERPL-MCNC: 8.1 MG/DL — LOW (ref 8.4–10.5)
CHLORIDE SERPL-SCNC: 104 MMOL/L — SIGNIFICANT CHANGE UP (ref 96–108)
CHOLEST SERPL-MCNC: 250 MG/DL — HIGH
CO2 SERPL-SCNC: 24 MMOL/L — SIGNIFICANT CHANGE UP (ref 22–31)
COCAINE METAB.OTHER UR-MCNC: NEGATIVE — SIGNIFICANT CHANGE UP
CREAT SERPL-MCNC: 0.55 MG/DL — SIGNIFICANT CHANGE UP (ref 0.5–1.3)
EGFR: 102 ML/MIN/1.73M2 — SIGNIFICANT CHANGE UP
ESTIMATED AVERAGE GLUCOSE: 114 MG/DL — SIGNIFICANT CHANGE UP (ref 68–114)
GLUCOSE SERPL-MCNC: 107 MG/DL — HIGH (ref 70–99)
HCT VFR BLD CALC: 35.1 % — SIGNIFICANT CHANGE UP (ref 34.5–45)
HCV AB S/CO SERPL IA: 0.15 S/CO — SIGNIFICANT CHANGE UP (ref 0–0.99)
HCV AB SERPL-IMP: SIGNIFICANT CHANGE UP
HDLC SERPL-MCNC: 64 MG/DL — SIGNIFICANT CHANGE UP
HGB BLD-MCNC: 12.5 G/DL — SIGNIFICANT CHANGE UP (ref 11.5–15.5)
LIPID PNL WITH DIRECT LDL SERPL: 163 MG/DL — HIGH
MAGNESIUM SERPL-MCNC: 2 MG/DL — SIGNIFICANT CHANGE UP (ref 1.6–2.6)
MCHC RBC-ENTMCNC: 32.9 PG — SIGNIFICANT CHANGE UP (ref 27–34)
MCHC RBC-ENTMCNC: 35.6 GM/DL — SIGNIFICANT CHANGE UP (ref 32–36)
MCV RBC AUTO: 92.4 FL — SIGNIFICANT CHANGE UP (ref 80–100)
METHADONE UR-MCNC: NEGATIVE — SIGNIFICANT CHANGE UP
NON HDL CHOLESTEROL: 187 MG/DL — HIGH
NRBC # BLD: 0 /100 WBCS — SIGNIFICANT CHANGE UP (ref 0–0)
OPIATES UR-MCNC: NEGATIVE — SIGNIFICANT CHANGE UP
OXYCODONE UR-MCNC: NEGATIVE — SIGNIFICANT CHANGE UP
PCP SPEC-MCNC: SIGNIFICANT CHANGE UP
PCP UR-MCNC: NEGATIVE — SIGNIFICANT CHANGE UP
PHOSPHATE SERPL-MCNC: 2.4 MG/DL — LOW (ref 2.5–4.5)
PLATELET # BLD AUTO: 189 K/UL — SIGNIFICANT CHANGE UP (ref 150–400)
POTASSIUM SERPL-MCNC: 3.3 MMOL/L — LOW (ref 3.5–5.3)
POTASSIUM SERPL-SCNC: 3.3 MMOL/L — LOW (ref 3.5–5.3)
PROT SERPL-MCNC: 6.7 G/DL — SIGNIFICANT CHANGE UP (ref 6–8.3)
RBC # BLD: 3.8 M/UL — SIGNIFICANT CHANGE UP (ref 3.8–5.2)
RBC # FLD: 11.6 % — SIGNIFICANT CHANGE UP (ref 10.3–14.5)
SODIUM SERPL-SCNC: 137 MMOL/L — SIGNIFICANT CHANGE UP (ref 135–145)
T3 SERPL-MCNC: 80 NG/DL — SIGNIFICANT CHANGE UP (ref 80–200)
T4 AB SER-ACNC: 6.2 UG/DL — SIGNIFICANT CHANGE UP (ref 4.6–12)
THC UR QL: NEGATIVE — SIGNIFICANT CHANGE UP
TRIGL SERPL-MCNC: 118 MG/DL — SIGNIFICANT CHANGE UP
TSH SERPL-MCNC: 2.29 UIU/ML — SIGNIFICANT CHANGE UP (ref 0.27–4.2)
WBC # BLD: 7.77 K/UL — SIGNIFICANT CHANGE UP (ref 3.8–10.5)
WBC # FLD AUTO: 7.77 K/UL — SIGNIFICANT CHANGE UP (ref 3.8–10.5)

## 2022-11-19 PROCEDURE — 99291 CRITICAL CARE FIRST HOUR: CPT

## 2022-11-19 PROCEDURE — 99223 1ST HOSP IP/OBS HIGH 75: CPT

## 2022-11-19 RX ORDER — MULTIVIT-MIN/FERROUS GLUCONATE 9 MG/15 ML
15 LIQUID (ML) ORAL DAILY
Refills: 0 | Status: DISCONTINUED | OUTPATIENT
Start: 2022-11-19 | End: 2022-11-21

## 2022-11-19 RX ORDER — NOREPINEPHRINE BITARTRATE/D5W 8 MG/250ML
0.03 PLASTIC BAG, INJECTION (ML) INTRAVENOUS
Qty: 8 | Refills: 0 | Status: DISCONTINUED | OUTPATIENT
Start: 2022-11-19 | End: 2022-11-19

## 2022-11-19 RX ORDER — POTASSIUM PHOSPHATE, MONOBASIC POTASSIUM PHOSPHATE, DIBASIC 236; 224 MG/ML; MG/ML
30 INJECTION, SOLUTION INTRAVENOUS ONCE
Refills: 0 | Status: COMPLETED | OUTPATIENT
Start: 2022-11-19 | End: 2022-11-20

## 2022-11-19 RX ORDER — THIAMINE MONONITRATE (VIT B1) 100 MG
500 TABLET ORAL EVERY 8 HOURS
Refills: 0 | Status: COMPLETED | OUTPATIENT
Start: 2022-11-19 | End: 2022-11-22

## 2022-11-19 RX ORDER — THIAMINE MONONITRATE (VIT B1) 100 MG
500 TABLET ORAL ONCE
Refills: 0 | Status: COMPLETED | OUTPATIENT
Start: 2022-11-19 | End: 2022-11-19

## 2022-11-19 RX ORDER — DEXMEDETOMIDINE HYDROCHLORIDE IN 0.9% SODIUM CHLORIDE 4 UG/ML
0.2 INJECTION INTRAVENOUS
Qty: 200 | Refills: 0 | Status: DISCONTINUED | OUTPATIENT
Start: 2022-11-19 | End: 2022-11-20

## 2022-11-19 RX ORDER — POTASSIUM CHLORIDE 20 MEQ
10 PACKET (EA) ORAL
Refills: 0 | Status: COMPLETED | OUTPATIENT
Start: 2022-11-19 | End: 2022-11-20

## 2022-11-19 RX ORDER — THIAMINE MONONITRATE (VIT B1) 100 MG
100 TABLET ORAL ONCE
Refills: 0 | Status: DISCONTINUED | OUTPATIENT
Start: 2022-11-18 | End: 2022-11-19

## 2022-11-19 RX ORDER — POLYETHYLENE GLYCOL 3350 17 G/17G
17 POWDER, FOR SOLUTION ORAL
Refills: 0 | Status: DISCONTINUED | OUTPATIENT
Start: 2022-11-18 | End: 2022-11-21

## 2022-11-19 RX ORDER — SODIUM CHLORIDE 9 MG/ML
1000 INJECTION INTRAMUSCULAR; INTRAVENOUS; SUBCUTANEOUS ONCE
Refills: 0 | Status: COMPLETED | OUTPATIENT
Start: 2022-11-19 | End: 2022-11-19

## 2022-11-19 RX ORDER — LANOLIN ALCOHOL/MO/W.PET/CERES
5 CREAM (GRAM) TOPICAL AT BEDTIME
Refills: 0 | Status: DISCONTINUED | OUTPATIENT
Start: 2022-11-19 | End: 2022-11-21

## 2022-11-19 RX ORDER — FENTANYL CITRATE 50 UG/ML
12.5 INJECTION INTRAVENOUS ONCE
Refills: 0 | Status: DISCONTINUED | OUTPATIENT
Start: 2022-11-19 | End: 2022-11-19

## 2022-11-19 RX ORDER — SODIUM CHLORIDE 9 MG/ML
500 INJECTION INTRAMUSCULAR; INTRAVENOUS; SUBCUTANEOUS ONCE
Refills: 0 | Status: COMPLETED | OUTPATIENT
Start: 2022-11-19 | End: 2022-11-19

## 2022-11-19 RX ORDER — CAPTOPRIL 12.5 MG/1
25 TABLET ORAL EVERY 8 HOURS
Refills: 0 | Status: DISCONTINUED | OUTPATIENT
Start: 2022-11-19 | End: 2022-11-21

## 2022-11-19 RX ORDER — PHENOBARBITAL 60 MG
130 TABLET ORAL ONCE
Refills: 0 | Status: DISCONTINUED | OUTPATIENT
Start: 2022-11-19 | End: 2022-11-19

## 2022-11-19 RX ORDER — FOLIC ACID 0.8 MG
1 TABLET ORAL DAILY
Refills: 0 | Status: DISCONTINUED | OUTPATIENT
Start: 2022-11-18 | End: 2022-11-21

## 2022-11-19 RX ORDER — ACETAMINOPHEN 500 MG
1000 TABLET ORAL ONCE
Refills: 0 | Status: DISCONTINUED | OUTPATIENT
Start: 2022-11-19 | End: 2022-11-19

## 2022-11-19 RX ORDER — ACETAMINOPHEN 500 MG
1000 TABLET ORAL ONCE
Refills: 0 | Status: COMPLETED | OUTPATIENT
Start: 2022-11-19 | End: 2022-11-19

## 2022-11-19 RX ORDER — PHENOBARBITAL 60 MG
260 TABLET ORAL ONCE
Refills: 0 | Status: DISCONTINUED | OUTPATIENT
Start: 2022-11-19 | End: 2022-11-19

## 2022-11-19 RX ORDER — SENNA PLUS 8.6 MG/1
2 TABLET ORAL AT BEDTIME
Refills: 0 | Status: DISCONTINUED | OUTPATIENT
Start: 2022-11-18 | End: 2022-11-21

## 2022-11-19 RX ADMIN — SODIUM CHLORIDE 1000 MILLILITER(S): 9 INJECTION INTRAMUSCULAR; INTRAVENOUS; SUBCUTANEOUS at 07:45

## 2022-11-19 RX ADMIN — Medication 105 MILLIGRAM(S): at 15:09

## 2022-11-19 RX ADMIN — FENTANYL CITRATE 12.5 MICROGRAM(S): 50 INJECTION INTRAVENOUS at 02:15

## 2022-11-19 RX ADMIN — Medication 408 MILLIGRAM(S): at 14:31

## 2022-11-19 RX ADMIN — Medication 130 MILLIGRAM(S): at 20:05

## 2022-11-19 RX ADMIN — Medication 105 MILLIGRAM(S): at 05:33

## 2022-11-19 RX ADMIN — Medication 15 MILLILITER(S): at 14:31

## 2022-11-19 RX ADMIN — Medication 100 MILLIEQUIVALENT(S): at 01:05

## 2022-11-19 RX ADMIN — FENTANYL CITRATE 12.5 MICROGRAM(S): 50 INJECTION INTRAVENOUS at 02:45

## 2022-11-19 RX ADMIN — SODIUM CHLORIDE 1000 MILLILITER(S): 9 INJECTION INTRAMUSCULAR; INTRAVENOUS; SUBCUTANEOUS at 19:15

## 2022-11-19 RX ADMIN — CAPTOPRIL 25 MILLIGRAM(S): 12.5 TABLET ORAL at 14:32

## 2022-11-19 RX ADMIN — CHLORHEXIDINE GLUCONATE 1 APPLICATION(S): 213 SOLUTION TOPICAL at 22:54

## 2022-11-19 RX ADMIN — Medication 100 MILLIEQUIVALENT(S): at 00:05

## 2022-11-19 RX ADMIN — Medication 1 MILLIGRAM(S): at 20:05

## 2022-11-19 RX ADMIN — Medication 130 MILLIGRAM(S): at 17:27

## 2022-11-19 RX ADMIN — Medication 100 MILLIEQUIVALENT(S): at 23:45

## 2022-11-19 RX ADMIN — Medication 1 MILLIGRAM(S): at 14:32

## 2022-11-19 RX ADMIN — Medication 100 MILLIEQUIVALENT(S): at 02:10

## 2022-11-19 RX ADMIN — Medication 105 MILLIGRAM(S): at 22:55

## 2022-11-19 NOTE — PROGRESS NOTE ADULT - SUBJECTIVE AND OBJECTIVE BOX
NSICU Progress Note:        24 HOUR EVENTS:   - admitted overnight, EVD placed for worsening mentation (AOx3 baseline>AOx1)  - EVD@10, no ICP issues; 75cc output since midnight            ICU Vital Signs Last 24 Hrs  T(C): 36.3 (19 Nov 2022 03:00), Max: 37.3 (18 Nov 2022 17:57)  T(F): 97.3 (19 Nov 2022 03:00), Max: 99.2 (18 Nov 2022 17:57)  HR: 68 (19 Nov 2022 05:45) (49 - 120)  BP: 146/93 (19 Nov 2022 02:00) (81/54 - 172/79)  BP(mean): 110 (19 Nov 2022 02:00) (61 - 111)  ABP: 128/49 (19 Nov 2022 05:45) (116/51 - 191/93)  ABP(mean): 74 (19 Nov 2022 05:45) (73 - 131)  RR: 14 (19 Nov 2022 05:45) (12 - 29)  SpO2: 97% (19 Nov 2022 05:45) (95% - 100%)    O2 Parameters below as of 19 Nov 2022 05:00  Patient On (Oxygen Delivery Method): room air           11-18 @ 07:01  -  11-19 @ 06:01  --------------------------------------------------------  IN: 1740.5 mL / OUT: 1027 mL / NET: 713.5 mL                             15.6   7.52  )-----------( 223      ( 18 Nov 2022 22:37 )             44.8    11-18    139  |  101  |  7   ----------------------------<  123<H>  3.5   |  25  |  0.55    Ca    9.1      18 Nov 2022 22:37  Phos  3.0     11-18  Mg     2.3     11-18    TPro  6.7  /  Alb  4.0  /  TBili  0.5  /  DBili  0.1  /  AST  20  /  ALT  13  /  AlkPhos  66  11-19              MEDICATIONS:  chlorhexidine 4% Liquid 1 Application(s) Topical <User Schedule>  folic acid 1 milliGRAM(s) Oral daily  multivitamin 1 Tablet(s) Oral daily  niCARdipine Infusion 5 mG/Hr IV Continuous <Continuous>  norepinephrine Infusion 0.03 MICROgram(s)/kG/Min IV Continuous <Continuous>  polyethylene glycol 3350 17 Gram(s) Oral two times a day  senna 2 Tablet(s) Oral at bedtime  sodium chloride 0.9%. 1000 milliLiter(s) IV Continuous <Continuous>            PHYSICAL EXAM:    General: calm  CVS: RRR  Pulm: CTAB  GI: Soft, NTND  Extremities: No LE Edema  Neuro: awake, oriented x1 (name, [place] and home), EOMI, no nystagmus, no facial, 5/5 throughout, no dysmetria                 NSICU Progress Note:        24 HOUR EVENTS:   - admitted overnight, EVD placed for worsening mentation (AOx3 baseline>AOx1)  - EVD@10, no ICP issues            ICU Vital Signs Last 24 Hrs  T(C): 37.2 (19 Nov 2022 15:00), Max: 37.2 (19 Nov 2022 11:00)  T(F): 99 (19 Nov 2022 15:00), Max: 99 (19 Nov 2022 11:00)  HR: 106 (19 Nov 2022 20:15) (47 - 112)  BP: 146/93 (19 Nov 2022 02:00) (81/54 - 172/79)  BP(mean): 110 (19 Nov 2022 02:00) (61 - 110)  ABP: 163/84 (19 Nov 2022 20:15) (88/49 - 191/93)  ABP(mean): 110 (19 Nov 2022 20:15) (61 - 131)  RR: 25 (19 Nov 2022 20:15) (12 - 29)  SpO2: 100% (19 Nov 2022 20:15) (94% - 100%)    O2 Parameters below as of 19 Nov 2022 19:00  Patient On (Oxygen Delivery Method): room air    MEDICATIONS:  chlorhexidine 4% Liquid 1 Application(s) Topical <User Schedule>  folic acid 1 milliGRAM(s) Oral daily  multivitamin 1 Tablet(s) Oral daily  niCARdipine Infusion 5 mG/Hr IV Continuous <Continuous>  norepinephrine Infusion 0.03 MICROgram(s)/kG/Min IV Continuous <Continuous>  polyethylene glycol 3350 17 Gram(s) Oral two times a day  senna 2 Tablet(s) Oral at bedtime  sodium chloride 0.9%. 1000 milliLiter(s) IV Continuous <Continuous>    Labs Reviewed    PHYSICAL EXAM:    General: calm  CVS: RRR  Pulm: CTAB  GI: Soft, NTND  Extremities: No LE Edema  Neuro: awake, agitated, oriented x0-1 (fluctuates), Non-cooperative, EOMI, no facial,  ALARCON spont/AG

## 2022-11-19 NOTE — SWALLOW BEDSIDE ASSESSMENT ADULT - SWALLOW EVAL: DIAGNOSIS
Pt presents with an oropharyngeal swallow sequence that appears WFL to tolerate a regular texture diet with no overt s/s of laryngeal penetration or aspiration. Given Pt's intermittent drowsiness, recommend start with somewhat softer foods, and will f/u for upgrade pending diet tolerance and improved overall alertness.

## 2022-11-19 NOTE — SWALLOW BEDSIDE ASSESSMENT ADULT - H & P REVIEW
67F Hx HTN HLD, presented to  w/dizziness & AMS since AM of 11/17. Patient reportedly early dementia (AOx3 bl). No AC/AP. CTH w/cerebellar IPH with IVH in 4th/3rd vent, ventriculomegaly, decreased but patent prepontine cistern. Plt 219. Exam: AOx1, PERRL, EOMI, no facial, no drift, ALARCON 5/5, mild L dysmetria./yes

## 2022-11-19 NOTE — SWALLOW BEDSIDE ASSESSMENT ADULT - SLP PERTINENT HISTORY OF CURRENT PROBLEM
CTH found to ahve cerebellar IPH with IVH and casting of 4th/3rd vent, ventriculomegaly, CTA without evidence of vascular malformation, noted to have change in mental status and urgent EVD was placed in ED (AOx3>1), admitted for further management.

## 2022-11-19 NOTE — CHART NOTE - NSCHARTNOTEFT_GEN_A_CORE
CAPRINI SCORE [CLOT] Score on Admission for     AGE RELATED RISK FACTORS                                                       MOBILITY RELATED FACTORS  [ ] Age 41-60 years                                            (1 Point)                  [ ] Bed rest                                                        (1 Point)  [x] Age 61-74 years                                           (2 Points)                 [ ] Plaster cast                                                   (2 Points)  [ ] Age > 75 years                                              (3 Points)                 [ ] Bed bound for more than 72 hours         (2 Points)    DISEASE RELATED RISK FACTORS                                               GENDER SPECIFIC FACTORS  [ ] Edema in the lower extremities                       (1 Point)           [ ] Pregnancy                                                          (1 Point)  [ ] Varicose veins                                               (1 Point)                  [ ] Post-partum < 6 weeks                                   (1 Point)             [ ] BMI > 25 Kg/m2                                            (1 Point)                  [ ] Hormonal therapy  or oral contraception   (1 Point)                 [ ] Sepsis (in the previous month)                        (1 Point)            [ ] History of pregnancy complications             (1 point)  [ ] Pneumonia or serious lung disease                                            [ ] Unexplained or recurrent               (1 Point)           (in the previous month)                               (1 Point)  [ ] Abnormal pulmonary function test                     (1 Point)                 SURGERY RELATED RISK FACTORS (include planned surgeries)  [ ] Acute myocardial infarction                              (1 Point)                 [ ]  Section                                             (1 Point)  [ ] Congestive heart failure (in the previous month)  (1 Point)        [ ] Minor surgery                                                  (1 Point)   [ ] Inflammatory bowel disease                             (1 Point)                 [ ] Arthroscopic surgery                                        (2 Points)  [ ] Central venous access                                      (2 Points)  [ ] History / presence of malignancy                   (2 points)   [ ] General surgery lasting more than 45 minutes   (2 Points)       [ ] Stroke (in the previous month)                          (5 Points)               [ ] Elective arthroplasty                                         (5 Points)                                                                                                                                               HEMATOLOGY RELATED FACTORS                                                 TRAUMA RELATED RISK FACTORS  [ ] Prior episodes of VTE                                     (3 Points)                [ ] Fracture of the hip, pelvis, or leg                       (5 Points)  [ ] Positive family history for VTE                         (3 Points)             [ ] Acute spinal cord injury (in the previous month)  (5 Points)  [ ] Prothrombin 02340 A                                     (3 Points)                [ ] Paralysis  (less than 1 month)                             (5 Points)  [ ] Factor V Leiden                                             (3 Points)                   [ ] Multiple Trauma within 1 month                        (5 Points)  [ ] Lupus anticoagulants                                     (3 Points)                                                           [ ] Anticardiolipin antibodies                               (3 Points)                                                       [ ] High homocysteine in the blood                      (3 Points)                                             [ ] Other congenital or acquired thrombophilia      (3 Points)                                                [ ] Heparin induced thrombocytopenia                  (3 Points)                                          Total Score [2]    Risk:  Very low 0   Low 1 to 2   Moderate 3 to 4   High =5       VTE Prophylaxis Recommendations:  [x] mechanical pneumatic compression devices                                      [ ] contraindicated: _____________________  [ ] chemoprophylaxis                                                                                    [ ] contraindicated: _____________________    **** HIGH LIKELIHOOD DVT PRESENT ON ADMISSION  [ ] (please order LE dopplers within 24 hours of admission)

## 2022-11-19 NOTE — PROGRESS NOTE ADULT - ASSESSMENT
HPI:  67F with hx of HTN, HLD, early dementia, etoh use disorder (last drink 11/16), with reported recent fall, who presented to [facility] for AMS, CTH found to ahve cerebellar IPH with IVH and casting of 4th/3rd vent, ventriculomegaly, CTA without evidence of vascular malformation, txe to [facility] and in ED noted to have change in mental status and urgent EVD was placed in ED (AOx3>1), admitted for further management.       NEURO:  - Neuorchecks Q1H  - CTA H&N- with no evidence of vascular malformation per radiology; CTH AM; stability scan comparred to OSH scan in PACS  - MRi brain with and w/o contrast  - SOCrani watch  - EVD at 10 and open, ICP <10  - CIWA protocol as the patient may be withdrawing from EtOH- last drink was 11/16; MVT; phenobarb 260 load x1 for tongue fasciculations in the s/o etoh withdrawal  - Stroke work up - stroke neurology on board      PULM:  - wean to RA as tolerated  - Aspiration precautions    CV:  - SBP goal 100-150 mmHg  - History of hypertension and dyslipidemia  - Will obtain TTE  - joy  - captopril 25q8h to wean off cardene    RENAL:  - Fluids: IVL  - Na goal 140-145    GI:  - Diet: dysphagia eval, then adat  - GI prophylaxis [] not indicated [x] PPI [] other:  - Bowel regimen [x] Miralax [x] senna [] other:    ENDO:   - Goal euglycemia (-180)  - A1c     HEME/ONC:  - H/H stable  - VTE prophylaxis: SCDs  - hold chemoprophylaxis due to fresh heme    ID:  - Afebrile, no leukocytosis     HPI:  67F with hx of HTN, HLD, early dementia, etoh use disorder (last drink 11/16), with reported recent fall, who presented to [facility] for AMS, CTH found to ahve cerebellar IPH with IVH and casting of 4th/3rd vent, ventriculomegaly, CTA without evidence of vascular malformation, txe to [facility] and in ED noted to have change in mental status and urgent EVD was placed in ED (AOx3>1), admitted for further management.       NEURO:  - Neuorchecks Q1H  - CTA H&N- with no evidence of vascular malformation per radiology; CTH AM; stability scan comparred to OSH scan in PACS  - MRi brain with and w/o contrast  - SOCrani watch  - EVD at 10 and open, ICP <10  - CIWA protocol as the patient may be withdrawing from EtOH- last drink was 11/16; MVT; phenobarb 260 load x1 for tongue fasciculations in the s/o etoh withdrawal  - Stroke work up - stroke neurology on board      PULM:  - RA  - Aspiration precautions  - IS    CV:  - SBP goal 100-150 mmHg  - History of hypertension and dyslipidemia  - Will obtain TTE  - joy  - captopril 25q8h    RENAL:  - Fluids: IVL  - Na goal 140-145    GI:  - Diet: Easy to chew  - GI prophylaxis [x] not indicated [] PPI [] other:  - Bowel regimen [x] Miralax [x] senna [] other:    ENDO:   - Goal euglycemia (-180)  - A1c 5.6    HEME/ONC:  - H/H stable  - VTE prophylaxis: SCDs  - hold chemoprophylaxis due to fresh heme, Consider DVT ppx, if stable CT AM    ID:  - Afebrile, no leukocytosis    45 critical care minutes spent with patients

## 2022-11-19 NOTE — PROGRESS NOTE ADULT - SUBJECTIVE AND OBJECTIVE BOX
NSICU Progress Note:        24 HOUR EVENTS:   - admitted overnight, EVD placed for worsening mentation (AOx3 baseline>AOx1)  - EVD@10, no ICP issues            ICU Vital Signs Last 24 Hrs  T(C): 36.3 (19 Nov 2022 03:00), Max: 37.3 (18 Nov 2022 17:57)  T(F): 97.3 (19 Nov 2022 03:00), Max: 99.2 (18 Nov 2022 17:57)  HR: 68 (19 Nov 2022 05:45) (49 - 120)  BP: 146/93 (19 Nov 2022 02:00) (81/54 - 172/79)  BP(mean): 110 (19 Nov 2022 02:00) (61 - 111)  ABP: 128/49 (19 Nov 2022 05:45) (116/51 - 191/93)  ABP(mean): 74 (19 Nov 2022 05:45) (73 - 131)  RR: 14 (19 Nov 2022 05:45) (12 - 29)  SpO2: 97% (19 Nov 2022 05:45) (95% - 100%)    O2 Parameters below as of 19 Nov 2022 05:00  Patient On (Oxygen Delivery Method): room air           11-18 @ 07:01  -  11-19 @ 06:01  --------------------------------------------------------  IN: 1740.5 mL / OUT: 1027 mL / NET: 713.5 mL                             15.6   7.52  )-----------( 223      ( 18 Nov 2022 22:37 )             44.8    11-18    139  |  101  |  7   ----------------------------<  123<H>  3.5   |  25  |  0.55    Ca    9.1      18 Nov 2022 22:37  Phos  3.0     11-18  Mg     2.3     11-18    TPro  6.7  /  Alb  4.0  /  TBili  0.5  /  DBili  0.1  /  AST  20  /  ALT  13  /  AlkPhos  66  11-19              MEDICATIONS:  chlorhexidine 4% Liquid 1 Application(s) Topical <User Schedule>  folic acid 1 milliGRAM(s) Oral daily  multivitamin 1 Tablet(s) Oral daily  niCARdipine Infusion 5 mG/Hr IV Continuous <Continuous>  norepinephrine Infusion 0.03 MICROgram(s)/kG/Min IV Continuous <Continuous>  polyethylene glycol 3350 17 Gram(s) Oral two times a day  senna 2 Tablet(s) Oral at bedtime  sodium chloride 0.9%. 1000 milliLiter(s) IV Continuous <Continuous>            PHYSICAL EXAM:    General: calm  CVS: RRR  Pulm: CTAB  GI: Soft, NTND  Extremities: No LE Edema  Neuro: Awake, alert, oriented to self, not following commands, moving all extremities strongly                 NSICU Progress Note:        24 HOUR EVENTS:   - admitted overnight, EVD placed for worsening mentation (AOx3 baseline>AOx1)  - EVD@10, no ICP issues; 75cc output since midnight            ICU Vital Signs Last 24 Hrs  T(C): 36.3 (19 Nov 2022 03:00), Max: 37.3 (18 Nov 2022 17:57)  T(F): 97.3 (19 Nov 2022 03:00), Max: 99.2 (18 Nov 2022 17:57)  HR: 68 (19 Nov 2022 05:45) (49 - 120)  BP: 146/93 (19 Nov 2022 02:00) (81/54 - 172/79)  BP(mean): 110 (19 Nov 2022 02:00) (61 - 111)  ABP: 128/49 (19 Nov 2022 05:45) (116/51 - 191/93)  ABP(mean): 74 (19 Nov 2022 05:45) (73 - 131)  RR: 14 (19 Nov 2022 05:45) (12 - 29)  SpO2: 97% (19 Nov 2022 05:45) (95% - 100%)    O2 Parameters below as of 19 Nov 2022 05:00  Patient On (Oxygen Delivery Method): room air           11-18 @ 07:01  -  11-19 @ 06:01  --------------------------------------------------------  IN: 1740.5 mL / OUT: 1027 mL / NET: 713.5 mL                             15.6   7.52  )-----------( 223      ( 18 Nov 2022 22:37 )             44.8    11-18    139  |  101  |  7   ----------------------------<  123<H>  3.5   |  25  |  0.55    Ca    9.1      18 Nov 2022 22:37  Phos  3.0     11-18  Mg     2.3     11-18    TPro  6.7  /  Alb  4.0  /  TBili  0.5  /  DBili  0.1  /  AST  20  /  ALT  13  /  AlkPhos  66  11-19              MEDICATIONS:  chlorhexidine 4% Liquid 1 Application(s) Topical <User Schedule>  folic acid 1 milliGRAM(s) Oral daily  multivitamin 1 Tablet(s) Oral daily  niCARdipine Infusion 5 mG/Hr IV Continuous <Continuous>  norepinephrine Infusion 0.03 MICROgram(s)/kG/Min IV Continuous <Continuous>  polyethylene glycol 3350 17 Gram(s) Oral two times a day  senna 2 Tablet(s) Oral at bedtime  sodium chloride 0.9%. 1000 milliLiter(s) IV Continuous <Continuous>            PHYSICAL EXAM:    General: calm  CVS: RRR  Pulm: CTAB  GI: Soft, NTND  Extremities: No LE Edema  Neuro: awake, oriented x1 (name, [place] and home), EOMI, no nystagmus, no facial, 5/5 throughout, no dysmetria

## 2022-11-19 NOTE — SWALLOW BEDSIDE ASSESSMENT ADULT - ASR SWALLOW ASPIRATION MONITOR
Monitor for s/s aspiration/laryngeal penetration. If noted:  D/C p.o. intake, provide non-oral nutrition/hydration/meds, and contact this service @ [phone]/change of breathing pattern/cough/gurgly voice/fever/pneumonia/throat clearing/upper respiratory infection

## 2022-11-19 NOTE — SWALLOW BEDSIDE ASSESSMENT ADULT - SLP GENERAL OBSERVATIONS
Pt seen at bedside, awake but drowsy, adequately alert with encouragement for participation in PO trials. Pt oriented to self only, verbally responsive, following most simple directions for the purposes of the exam.

## 2022-11-19 NOTE — PROGRESS NOTE ADULT - ASSESSMENT
HPI:  67F with hx of HTN, HLD, early dementia, etoh use disorder (last drink 11/16), with reported recent fall, who presented to [facility] for AMS, CTH found to ahve cerebellar IPH with IVH and casting of 4th/3rd vent, ventriculomegaly, CTA without evidence of vascular malformation, noted to have change in mental status and urgent EVD was placed in ED (AOx3>1), admitted for further management.       NEURO:  - Neuorchecks Q1H  - CTA H&N- with no evidence of vascular malformation per radiology  - MRi brain with and w/o contrast; consider angio pending mri  - SOCrani watch  - EVD at 10 and open, ICP <10  - CIWA protocol as the patient may be withdrawing from EtOH- last drink was 11/16; MVT  - Stroke work up - stroke neurology on board      PULM:  - 2L NC  - wean to RA as tolerated  - Aspiration precautions    CV:  - SBP goal 100-150 mmHg  - History of hypertension and dyslipidemia  - Will obtain TTE Lipid levels  - joy    RENAL:  - Fluids: IVF at 50 cc/hour  - Na goal 135-145, Na 139    GI:  - Diet: NPO pending posible intervention  - GI prophylaxis [] not indicated [x] PPI [] other:  - Bowel regimen [x] Miralax [x] senna [] other:    ENDO:   - Goal euglycemia (-180)  - A1c     HEME/ONC:  - H/H stable  - VTE prophylaxis: SCDs  - hold chemoprophylaxis due to fresh heme    ID:  - Afebrile, no leukocytosis     HPI:  67F with hx of HTN, HLD, early dementia, etoh use disorder (last drink 11/16), with reported recent fall, who presented to [facility] for AMS, CTH found to ahve cerebellar IPH with IVH and casting of 4th/3rd vent, ventriculomegaly, CTA without evidence of vascular malformation, txe to [facility] and in ED noted to have change in mental status and urgent EVD was placed in ED (AOx3>1), admitted for further management.       NEURO:  - Neuorchecks Q1H  - CTA H&N- with no evidence of vascular malformation per radiology; CTH AM; stability scan comparred to OSH scan in PACS  - MRi brain with and w/o contrast  - SOCrani watch  - EVD at 10 and open, ICP <10  - CIWA protocol as the patient may be withdrawing from EtOH- last drink was 11/16; MVT; phenobarb 260 load x1 for tongue fasciculations in the s/o etoh withdrawal  - Stroke work up - stroke neurology on board      PULM:  - wean to RA as tolerated  - Aspiration precautions    CV:  - SBP goal 100-150 mmHg  - History of hypertension and dyslipidemia  - Will obtain TTE  - joy  - captopril 25q8h to wean off cardene    RENAL:  - Fluids: IVL  - Na goal 140-145    GI:  - Diet: dysphagia eval, then adat  - GI prophylaxis [] not indicated [x] PPI [] other:  - Bowel regimen [x] Miralax [x] senna [] other:    ENDO:   - Goal euglycemia (-180)  - A1c     HEME/ONC:  - H/H stable  - VTE prophylaxis: SCDs  - hold chemoprophylaxis due to fresh heme    ID:  - Afebrile, no leukocytosis

## 2022-11-20 ENCOUNTER — TRANSCRIPTION ENCOUNTER (OUTPATIENT)
Age: 67
End: 2022-11-20

## 2022-11-20 LAB
ANION GAP SERPL CALC-SCNC: 12 MMOL/L — SIGNIFICANT CHANGE UP (ref 5–17)
BUN SERPL-MCNC: 6 MG/DL — LOW (ref 7–23)
CALCIUM SERPL-MCNC: 8.6 MG/DL — SIGNIFICANT CHANGE UP (ref 8.4–10.5)
CHLORIDE SERPL-SCNC: 101 MMOL/L — SIGNIFICANT CHANGE UP (ref 96–108)
CO2 SERPL-SCNC: 24 MMOL/L — SIGNIFICANT CHANGE UP (ref 22–31)
CREAT SERPL-MCNC: 0.44 MG/DL — LOW (ref 0.5–1.3)
CULTURE RESULTS: SIGNIFICANT CHANGE UP
EGFR: 107 ML/MIN/1.73M2 — SIGNIFICANT CHANGE UP
FOLATE SERPL-MCNC: >20 NG/ML — SIGNIFICANT CHANGE UP
GLUCOSE BLDC GLUCOMTR-MCNC: 175 MG/DL — HIGH (ref 70–99)
GLUCOSE BLDC GLUCOMTR-MCNC: 87 MG/DL — SIGNIFICANT CHANGE UP (ref 70–99)
GLUCOSE SERPL-MCNC: 92 MG/DL — SIGNIFICANT CHANGE UP (ref 70–99)
MAGNESIUM SERPL-MCNC: 2.1 MG/DL — SIGNIFICANT CHANGE UP (ref 1.6–2.6)
PHOSPHATE SERPL-MCNC: 2.9 MG/DL — SIGNIFICANT CHANGE UP (ref 2.5–4.5)
POTASSIUM SERPL-MCNC: 3.5 MMOL/L — SIGNIFICANT CHANGE UP (ref 3.5–5.3)
POTASSIUM SERPL-SCNC: 3.5 MMOL/L — SIGNIFICANT CHANGE UP (ref 3.5–5.3)
SODIUM SERPL-SCNC: 137 MMOL/L — SIGNIFICANT CHANGE UP (ref 135–145)
SPECIMEN SOURCE: SIGNIFICANT CHANGE UP
VIT B12 SERPL-MCNC: 295 PG/ML — SIGNIFICANT CHANGE UP (ref 232–1245)

## 2022-11-20 PROCEDURE — 99291 CRITICAL CARE FIRST HOUR: CPT

## 2022-11-20 PROCEDURE — 70553 MRI BRAIN STEM W/O & W/DYE: CPT | Mod: 26

## 2022-11-20 PROCEDURE — 93306 TTE W/DOPPLER COMPLETE: CPT | Mod: 26

## 2022-11-20 PROCEDURE — 70450 CT HEAD/BRAIN W/O DYE: CPT | Mod: 26

## 2022-11-20 RX ORDER — POTASSIUM CHLORIDE 20 MEQ
10 PACKET (EA) ORAL
Refills: 0 | Status: COMPLETED | OUTPATIENT
Start: 2022-11-20 | End: 2022-11-20

## 2022-11-20 RX ORDER — DEXTROSE 50 % IN WATER 50 %
25 SYRINGE (ML) INTRAVENOUS ONCE
Refills: 0 | Status: COMPLETED | OUTPATIENT
Start: 2022-11-20 | End: 2022-11-20

## 2022-11-20 RX ORDER — SODIUM CHLORIDE 9 MG/ML
1000 INJECTION INTRAMUSCULAR; INTRAVENOUS; SUBCUTANEOUS
Refills: 0 | Status: DISCONTINUED | OUTPATIENT
Start: 2022-11-20 | End: 2022-11-22

## 2022-11-20 RX ORDER — PREGABALIN 225 MG/1
1000 CAPSULE ORAL ONCE
Refills: 0 | Status: COMPLETED | OUTPATIENT
Start: 2022-11-20 | End: 2022-11-20

## 2022-11-20 RX ORDER — ENOXAPARIN SODIUM 100 MG/ML
40 INJECTION SUBCUTANEOUS
Refills: 0 | Status: DISCONTINUED | OUTPATIENT
Start: 2022-11-20 | End: 2022-11-23

## 2022-11-20 RX ADMIN — Medication 1 MILLIGRAM(S): at 12:15

## 2022-11-20 RX ADMIN — ENOXAPARIN SODIUM 40 MILLIGRAM(S): 100 INJECTION SUBCUTANEOUS at 18:09

## 2022-11-20 RX ADMIN — Medication 105 MILLIGRAM(S): at 06:10

## 2022-11-20 RX ADMIN — Medication 105 MILLIGRAM(S): at 13:43

## 2022-11-20 RX ADMIN — CHLORHEXIDINE GLUCONATE 1 APPLICATION(S): 213 SOLUTION TOPICAL at 21:50

## 2022-11-20 RX ADMIN — Medication 100 MILLIEQUIVALENT(S): at 18:09

## 2022-11-20 RX ADMIN — Medication 100 MILLIEQUIVALENT(S): at 16:00

## 2022-11-20 RX ADMIN — Medication 25 MILLILITER(S): at 21:35

## 2022-11-20 RX ADMIN — Medication 100 MILLIEQUIVALENT(S): at 01:52

## 2022-11-20 RX ADMIN — Medication 100 MILLIEQUIVALENT(S): at 17:00

## 2022-11-20 RX ADMIN — Medication 100 MILLIEQUIVALENT(S): at 00:55

## 2022-11-20 RX ADMIN — Medication 1 MILLIGRAM(S): at 08:05

## 2022-11-20 RX ADMIN — POTASSIUM PHOSPHATE, MONOBASIC POTASSIUM PHOSPHATE, DIBASIC 83.33 MILLIMOLE(S): 236; 224 INJECTION, SOLUTION INTRAVENOUS at 01:30

## 2022-11-20 RX ADMIN — Medication 105 MILLIGRAM(S): at 21:49

## 2022-11-20 NOTE — PHYSICAL THERAPY INITIAL EVALUATION ADULT - PERTINENT HX OF CURRENT PROBLEM, REHAB EVAL
Pt is a 67 y.o. F with hx of HTN, HLD, early dementia, etoh use disorder (last drink 11/16), with reported recent fall, who presented to [facility] for AMS, CTH found to ahve cerebellar IPH with IVH and casting of 4th/3rd vent, ventriculomegaly, CTA without evidence of vascular malformation, in ED noted to have change in mental status and urgent EVD was placed in ED (AOx3>1). CTH 11/20: Unchanged left cerebral hemorrhage with ventricular extension compared with 11/18/2022. Unchanged mild hydrocephalus with intraventricular drain.

## 2022-11-20 NOTE — PHYSICAL THERAPY INITIAL EVALUATION ADULT - ADDITIONAL COMMENTS
pt confused, as per daughter at b/s, resides in a PH with family, 10 stairs to enter, one flight up to bedroom, PTA, pt amb (I), (I) with ADLs.

## 2022-11-20 NOTE — PROGRESS NOTE ADULT - SUBJECTIVE AND OBJECTIVE BOX
NEUROCRITICAL CARE ATTENDING EVENING NOTE    BRIEF SUMMARY:  66yo woman adm for cerebellar ICH with IVH and hydrocephalus, requiring EVD placement and CSF diversion.       OVERNIGHT EVENTS:    VITALS/IMAGING/DATA/IVF FLUIDS/MEDICATIONS: [] Reviewed    ALLERGIES: Allergies    Allergy Status Unknown    Intolerances        EXAMINATION:  General: No acute distress  HEENT: Anicteric sclerae  Cardiac: [id number]  Lungs: Clear  Abdomen: Soft, non-tender, +BS  Extremities: No c/c/e  Skin/Incision Site: Clean, dry and intact  Neurologic: Awake, alert, fully oriented, follows commands, PERRL, VFFtc, EOMI, face symmetric, tongue midline, no drift, full strength    ASSESSMENT:    PLAN:  Feeding: [] diet [] tube feeds  Analgesia/Sedation:   Seizure prophylaxis: [] not indicated  Thromboprophylaxis: [] SCDs [] chemoprophylaxis [] hold chemoprophylaxis due to: [] high risk of DVT/PE on admission due to:  Head of Bed/Activity: [] 30 degrees [] mobilize as tolerated [] PT [] OT [] PMNR  Ulcer prophylaxis: [] not indicated [] PPI [] other:  Glucose Control: Goal -180 [] RISS [] other:    [] Patient critically ill due to:    Time Seen:  Time Spent: __ [] critical care minutes    Contact: [phone] NEUROCRITICAL CARE ATTENDING EVENING NOTE    BRIEF SUMMARY:  68yo woman adm for cerebellar ICH with IVH and hydrocephalus, requiring EVD placement and CSF diversion. c/b EtOH withdrawal requiring phenobarb and ativan PRN.     Ativan x3 throughout the day, lethargic now.     VITALS/IMAGING/DATA/IVF FLUIDS/MEDICATIONS: [x] Reviewed  MRI  Left cerebellar hemorrhage with intraventricular extension   and mild hydrocephalus. Right frontal ventricular catheter. Nonspecific   white matter changes likely representing nonacute ischemia. Differential   diagnosis for hemorrhage includes hypertension, multiple cavernomas and   amyloid angiopathy.      ALLERGIES: Allergies    Allergy Status Unknown    Intolerances      EXAMINATION:  General: No acute distress  HEENT: Anicteric sclerae  Cardiac: [id number]  Lungs: Clear  Abdomen: Soft, non-tender, +BS  Extremities: No c/c/e  Skin/Incision Site: Clean, dry and intact  Neurologic: lethargic, minimal EO to noxious, pupils 5mm briskly reactive b/l, no FC, ALARCON to stim   FS 87    ASSESSMENT: L cerebellar ICH with IVH    PLAN:  EVD in place [id number], monitor ICP, output  MRI as above, unchanged  CIWA protocol, vEEG x24hrs given fluctuating exam  MVI  delirium precaution  nicardipine gtt -160  unable to swallow due to mental status, PO meds not being given, if still lethargic in am, will need NGT for enteral access  Seizure prophylaxis: [x] not indicated but given EtOH, vEEG now   Thromboprophylaxis: [x] SCDs [x] chemoprophylaxis [] hold chemoprophylaxis due to: [] high risk of DVT/PE on admission due to:  Head of Bed/Activity: [x] 30 degrees [] mobilize as tolerated [] PT [] OT [] PMNR  Ulcer prophylaxis: [x] not indicated [] PPI [] other:  Glucose Control: Goal -180 87 now, give 1/2 amp D50, given NPO, recheck     [x] Patient critically ill due to: cerebellar ICH, IVH, hydrocephalus requiring CSF diversion   son/daughter updated at bedside     Contact: [phone]

## 2022-11-20 NOTE — PROGRESS NOTE ADULT - ASSESSMENT
HPI:  67F with hx of HTN, HLD, early dementia, etoh use disorder (last drink 11/16), with reported recent fall, who presented to [facility] for AMS, CTH found to ahve cerebellar IPH with IVH and casting of 4th/3rd vent, ventriculomegaly, CTA without evidence of vascular malformation, txe to [facility] and in ED noted to have change in mental status and urgent EVD was placed in ED (AOx3>1), admitted for further management.       NEURO:  - Neuorchecks Q1H  - CTA H&N- with no evidence of vascular malformation per radiology; CTH AM; stability scan comparred to OSH scan in PACS  - MRi brain with and w/o contrast  - SOCrani watch  - EVD at 10 and open, ICP <10  - CIWA protocol as the patient may be withdrawing from EtOH- last drink was 11/16; MVT; phenobarb 260 load x1 for tongue fasciculations in the s/o etoh withdrawal  - Stroke work up - stroke neurology on board      PULM:  - RA  - Aspiration precautions  - IS    CV:  - SBP goal 100-150 mmHg  - History of hypertension and dyslipidemia  - Will obtain TTE  - joy  - captopril 25q8h    RENAL:  - Fluids: IVL  - Na goal 140-145    GI:  - Diet: Easy to chew  - GI prophylaxis [x] not indicated [] PPI [] other:  - Bowel regimen [x] Miralax [x] senna [] other:    ENDO:   - Goal euglycemia (-180)  - A1c 5.6    HEME/ONC:  - H/H stable  - VTE prophylaxis: SCDs  - hold chemoprophylaxis due to fresh heme, Consider DVT ppx, if stable CT AM    ID:  - Afebrile, no leukocytosis    45 critical care minutes spent with patients HPI:  67F with hx of HTN, HLD, early dementia, etoh use disorder (last drink 11/16), with reported recent fall, who presented to [facility] for AMS, CTH found to ahve cerebellar IPH with IVH and casting of 4th/3rd vent, ventriculomegaly, CTA without evidence of vascular malformation, txe to [facility] and in ED noted to have change in mental status and urgent EVD was placed in ED (AOx3>1), admitted for further management.       NEURO:  - Neuorchecks Q1H  - CTA H&N- with no evidence of vascular malformation per radiology; CTH AM; stability scan comparred to OSH scan in PACS  - MRi brain with and w/o contrast  - SOCrani watch  - EVD at 10 and open, ICP <10  - CIWA protocol as the patient may be withdrawing from EtOH- last drink was 11/16; MVT; phenobarb 260 load x1 for tongue fasciculations in the s/o etoh withdrawal  - Stroke work up - stroke neurology on board  - Low B12- 295, supplemented with 1000 mcg of vitamin B12 x1, folate levels >2      PULM:  - RA  - Aspiration precautions  - IS    CV:  - SBP goal 100-150 mmHg  - History of hypertension and dyslipidemia  - Will obtain TTE  - joy  - captopril 25q8h    RENAL:  - Fluids: IVL  - Na goal 140-145    GI:  - Diet: Easy to chew  - GI prophylaxis [x] not indicated [] PPI [] other:  - Bowel regimen [x] Miralax [x] senna [] other:    ENDO:   - Goal euglycemia (-180)  - A1c 5.6    HEME/ONC:  - H/H stable  - VTE prophylaxis: SCDs  - hold chemoprophylaxis due to fresh heme, Consider DVT ppx, if stable CT AM    ID:  - Afebrile, no leukocytosis    45 critical care minutes spent with patients HPI:  67F with hx of HTN, HLD, early dementia, etoh use disorder (last drink 11/16), with reported recent fall, who presented to [facility] for AMS, CTH found to ahve cerebellar IPH with IVH and casting of 4th/3rd vent, ventriculomegaly, CTA without evidence of vascular malformation, txe to [facility] and in ED noted to have change in mental status and urgent EVD was placed in ED (AOx3>1), admitted for further management.     NEURO:  - Neuorchecks Q2H  - CTA H&N- with no evidence of vascular malformation per radiology; CTH AM; stability scan comparred to OSH scan in PACS  - MRi brain with and w/o contrast  - SOCrani watch  - EVD at 10 and open, ICP <10  - CIWA protocol as the patient may be withdrawing from EtOH- last drink was 11/16; MVT; phenobarb 260 load x1 for tongue fasciculations in the s/o etoh withdrawal  - Stroke work up - stroke neurology on board  - Low B12- 295, supplemented with 1000 mcg of vitamin B12 x1, folate levels >2    PULM:  - RA  - Aspiration precautions  - IS    CV:  - SBP goal 100-160 mmHg  - History of hypertension and dyslipidemia  - Will obtain TTE  - joy  - captopril 25q8h    RENAL:  - Fluids: IVL  - Na goal 140-145    GI:  - Diet: Easy to chew  - GI prophylaxis [x] not indicated [] PPI [] other:  - Bowel regimen [x] Miralax [x] senna [] other:  - LBM prior to admission     ENDO:   - Goal euglycemia (-180)  - A1c 5.6    HEME/ONC:  - H/H stable  - VTE prophylaxis: SCDs  - 11/20- Will start SQL    ID:  - Afebrile, no leukocytosis    45 critical care minutes spent with patients HPI:  67F with hx of HTN, HLD, early dementia, etoh use disorder (last drink 11/16), with reported recent fall, who presented to [facility] for AMS, CTH found to ahve cerebellar IPH with IVH and casting of 4th/3rd vent, ventriculomegaly, CTA without evidence of vascular malformation, txe to [facility] and in ED noted to have change in mental status and urgent EVD was placed in ED (AOx3>1), admitted for further management.     NEURO:  - Neuorchecks Q2H  - CTA H&N- with no evidence of vascular malformation per radiology; CTH AM; stability scan comparred to OSH scan in PACS  - MRi brain with and w/o contrast  - SOCrani watch  - EVD at 10 and open, ICP <10  - CIWA protocol as the patient may be withdrawing from EtOH- last drink was 11/16; MVT; phenobarb 260 load x1 for tongue fasciculations in the s/o etoh withdrawal  - Stroke work up - stroke neurology on board  - Low B12- 295, supplemented with 1000 mcg of vitamin B12 x1, folate levels >2    PULM:  - RA  - Aspiration precautions  - IS    CV:  - SBP goal 100-160 mmHg  - History of hypertension and dyslipidemia  - Will obtain TTE  - joy  - captopril 25q8h    RENAL:  - Fluids: IVL  - Na goal 140-145    GI:  - Diet: Easy to chew  - GI prophylaxis [x] not indicated [] PPI [] other:  - Bowel regimen [x] Miralax [x] senna [] other:  - LBM prior to admission     ENDO:   - Goal euglycemia (-180)  - A1c 5.6    HEME/ONC:  - H/H stable  - VTE prophylaxis: SCDs  - 11/20- Will start SQL    ID:  - Afebrile, no leukocytosis

## 2022-11-20 NOTE — DISCHARGE NOTE NURSING/CASE MANAGEMENT/SOCIAL WORK - PATIENT PORTAL LINK FT
You can access the FollowMyHealth Patient Portal offered by [facility] by registering at the following website: [email or web address]. By joining [facility]’s FollowMyHealth portal, you will also be able to view your health information using other applications (apps) compatible with our system.

## 2022-11-20 NOTE — DISCHARGE NOTE NURSING/CASE MANAGEMENT/SOCIAL WORK - NSDCVIVACCINE_GEN_ALL_CORE_FT
No Vaccines Administered. Pfizer-BioNTech Covid-19 Vaccine, Bivalent; 29-Nov-2022 11:57; Marlon Enciso (RN); [employer]; [id number] (Exp. Date: 12-Jan-2023); IntraMuscular; Deltoid Left.; 0.3 milliLiter(s);

## 2022-11-20 NOTE — PROGRESS NOTE ADULT - SUBJECTIVE AND OBJECTIVE BOX
NSICU Progress Note:        24 HOUR EVENTS:   - admitted overnight, EVD placed for worsening mentation (AOx3 baseline>AOx1)  - EVD@10, no ICP issues        Labs Reviewed    PHYSICAL EXAM:    General: calm  CVS: RRR  Pulm: CTAB  GI: Soft, NTND  Extremities: No LE Edema  Neuro: awake, agitated, oriented x0-1 (fluctuates), Non-cooperative, EOMI, no facial,  ALARCON spont/AG

## 2022-11-20 NOTE — PATIENT PROFILE ADULT - NSPRESCRALCFREQ_GEN_A_NUR
4 or more times a week answers from family members at bedside (daughter, son, and )/4 or more times a week

## 2022-11-20 NOTE — PATIENT PROFILE ADULT - FALL HARM RISK - HARM RISK INTERVENTIONS
Assistance with ambulation/Assistance OOB with selected safe patient handling equipment/Communicate Risk of Fall with Harm to all staff/Discuss with provider need for PT consult/Monitor for mental status changes/Monitor gait and stability/Reinforce activity limits and safety measures with patient and family/Sit up slowly, dangle for a short time, stand at bedside before walking/Tailored Fall Risk Interventions/Toileting schedule using arm’s reach rule for commode and bathroom/Use of alarms - bed, chair and/or voice tab/Visual Cue: Yellow wristband and red socks/Bed in lowest position, wheels locked, appropriate side rails in place/Call bell, personal items and telephone in reach/Instruct patient to call for assistance before getting out of bed or chair/Non-slip footwear when patient is out of bed/[place] to call system/Physically safe environment - no spills, clutter or unnecessary equipment/Purposeful Proactive Rounding/Room/bathroom lighting operational, light cord in reach

## 2022-11-20 NOTE — DISCHARGE NOTE NURSING/CASE MANAGEMENT/SOCIAL WORK - NSDCPEFALRISK_GEN_ALL_CORE
For information on Fall & Injury Prevention, visit: [email or web address] OR  [email or web address] OR  https://www.cdc.gov/steadi/patient.html

## 2022-11-21 LAB
ANION GAP SERPL CALC-SCNC: 13 MMOL/L — SIGNIFICANT CHANGE UP (ref 5–17)
BUN SERPL-MCNC: 8 MG/DL — SIGNIFICANT CHANGE UP (ref 7–23)
CALCIUM SERPL-MCNC: 8.7 MG/DL — SIGNIFICANT CHANGE UP (ref 8.4–10.5)
CHLORIDE SERPL-SCNC: 99 MMOL/L — SIGNIFICANT CHANGE UP (ref 96–108)
CO2 SERPL-SCNC: 23 MMOL/L — SIGNIFICANT CHANGE UP (ref 22–31)
CREAT SERPL-MCNC: 0.42 MG/DL — LOW (ref 0.5–1.3)
EGFR: 108 ML/MIN/1.73M2 — SIGNIFICANT CHANGE UP
GLUCOSE SERPL-MCNC: 99 MG/DL — SIGNIFICANT CHANGE UP (ref 70–99)
HCT VFR BLD CALC: 36.3 % — SIGNIFICANT CHANGE UP (ref 34.5–45)
HGB BLD-MCNC: 13.1 G/DL — SIGNIFICANT CHANGE UP (ref 11.5–15.5)
MAGNESIUM SERPL-MCNC: 2 MG/DL — SIGNIFICANT CHANGE UP (ref 1.6–2.6)
MCHC RBC-ENTMCNC: 32.6 PG — SIGNIFICANT CHANGE UP (ref 27–34)
MCHC RBC-ENTMCNC: 36.1 GM/DL — HIGH (ref 32–36)
MCV RBC AUTO: 90.3 FL — SIGNIFICANT CHANGE UP (ref 80–100)
NRBC # BLD: 0 /100 WBCS — SIGNIFICANT CHANGE UP (ref 0–0)
PHOSPHATE SERPL-MCNC: 2.4 MG/DL — LOW (ref 2.5–4.5)
PLATELET # BLD AUTO: 191 K/UL — SIGNIFICANT CHANGE UP (ref 150–400)
POTASSIUM SERPL-MCNC: 3.5 MMOL/L — SIGNIFICANT CHANGE UP (ref 3.5–5.3)
POTASSIUM SERPL-SCNC: 3.5 MMOL/L — SIGNIFICANT CHANGE UP (ref 3.5–5.3)
RBC # BLD: 4.02 M/UL — SIGNIFICANT CHANGE UP (ref 3.8–5.2)
RBC # FLD: 11.3 % — SIGNIFICANT CHANGE UP (ref 10.3–14.5)
SODIUM SERPL-SCNC: 135 MMOL/L — SIGNIFICANT CHANGE UP (ref 135–145)
WBC # BLD: 8.83 K/UL — SIGNIFICANT CHANGE UP (ref 3.8–10.5)
WBC # FLD AUTO: 8.83 K/UL — SIGNIFICANT CHANGE UP (ref 3.8–10.5)

## 2022-11-21 PROCEDURE — 99291 CRITICAL CARE FIRST HOUR: CPT

## 2022-11-21 PROCEDURE — 70450 CT HEAD/BRAIN W/O DYE: CPT | Mod: 26

## 2022-11-21 PROCEDURE — 95720 EEG PHY/QHP EA INCR W/VEEG: CPT

## 2022-11-21 RX ORDER — SENNA PLUS 8.6 MG/1
2 TABLET ORAL AT BEDTIME
Refills: 0 | Status: DISCONTINUED | OUTPATIENT
Start: 2022-11-21 | End: 2022-11-29

## 2022-11-21 RX ORDER — DEXMEDETOMIDINE HYDROCHLORIDE IN 0.9% SODIUM CHLORIDE 4 UG/ML
0.2 INJECTION INTRAVENOUS
Qty: 200 | Refills: 0 | Status: DISCONTINUED | OUTPATIENT
Start: 2022-11-21 | End: 2022-11-22

## 2022-11-21 RX ORDER — CAPTOPRIL 12.5 MG/1
25 TABLET ORAL EVERY 8 HOURS
Refills: 0 | Status: DISCONTINUED | OUTPATIENT
Start: 2022-11-21 | End: 2022-11-22

## 2022-11-21 RX ORDER — POLYETHYLENE GLYCOL 3350 17 G/17G
17 POWDER, FOR SOLUTION ORAL
Refills: 0 | Status: DISCONTINUED | OUTPATIENT
Start: 2022-11-21 | End: 2022-11-29

## 2022-11-21 RX ORDER — FOLIC ACID 0.8 MG
1 TABLET ORAL DAILY
Refills: 0 | Status: DISCONTINUED | OUTPATIENT
Start: 2022-11-21 | End: 2022-11-29

## 2022-11-21 RX ORDER — POTASSIUM CHLORIDE 20 MEQ
40 PACKET (EA) ORAL ONCE
Refills: 0 | Status: COMPLETED | OUTPATIENT
Start: 2022-11-21 | End: 2022-11-22

## 2022-11-21 RX ORDER — ACETAMINOPHEN 500 MG
1000 TABLET ORAL ONCE
Refills: 0 | Status: COMPLETED | OUTPATIENT
Start: 2022-11-21 | End: 2022-11-21

## 2022-11-21 RX ORDER — MULTIVIT-MIN/FERROUS GLUCONATE 9 MG/15 ML
1 LIQUID (ML) ORAL DAILY
Refills: 0 | Status: DISCONTINUED | OUTPATIENT
Start: 2022-11-21 | End: 2022-11-29

## 2022-11-21 RX ORDER — POTASSIUM PHOSPHATE, MONOBASIC POTASSIUM PHOSPHATE, DIBASIC 236; 224 MG/ML; MG/ML
15 INJECTION, SOLUTION INTRAVENOUS ONCE
Refills: 0 | Status: COMPLETED | OUTPATIENT
Start: 2022-11-21 | End: 2022-11-22

## 2022-11-21 RX ORDER — THIAMINE MONONITRATE (VIT B1) 100 MG
100 TABLET ORAL DAILY
Refills: 0 | Status: DISCONTINUED | OUTPATIENT
Start: 2022-11-21 | End: 2022-11-29

## 2022-11-21 RX ORDER — FOLIC ACID 0.8 MG
1 TABLET ORAL DAILY
Refills: 0 | Status: DISCONTINUED | OUTPATIENT
Start: 2022-11-21 | End: 2022-11-21

## 2022-11-21 RX ADMIN — Medication 1 MILLIGRAM(S): at 10:50

## 2022-11-21 RX ADMIN — Medication 400 MILLIGRAM(S): at 10:15

## 2022-11-21 RX ADMIN — Medication 400 MILLIGRAM(S): at 04:15

## 2022-11-21 RX ADMIN — CAPTOPRIL 25 MILLIGRAM(S): 12.5 TABLET ORAL at 21:29

## 2022-11-21 RX ADMIN — SENNA PLUS 2 TABLET(S): 8.6 TABLET ORAL at 22:03

## 2022-11-21 RX ADMIN — Medication 105 MILLIGRAM(S): at 22:04

## 2022-11-21 RX ADMIN — Medication 105 MILLIGRAM(S): at 06:01

## 2022-11-21 RX ADMIN — Medication 1000 MILLIGRAM(S): at 10:45

## 2022-11-21 RX ADMIN — Medication 1 MILLIGRAM(S): at 21:30

## 2022-11-21 RX ADMIN — SODIUM CHLORIDE 50 MILLILITER(S): 9 INJECTION INTRAMUSCULAR; INTRAVENOUS; SUBCUTANEOUS at 19:46

## 2022-11-21 RX ADMIN — DEXMEDETOMIDINE HYDROCHLORIDE IN 0.9% SODIUM CHLORIDE 2.63 MICROGRAM(S)/KG/HR: 4 INJECTION INTRAVENOUS at 23:47

## 2022-11-21 RX ADMIN — POLYETHYLENE GLYCOL 3350 17 GRAM(S): 17 POWDER, FOR SOLUTION ORAL at 18:02

## 2022-11-21 RX ADMIN — CHLORHEXIDINE GLUCONATE 1 APPLICATION(S): 213 SOLUTION TOPICAL at 22:46

## 2022-11-21 RX ADMIN — ENOXAPARIN SODIUM 40 MILLIGRAM(S): 100 INJECTION SUBCUTANEOUS at 17:45

## 2022-11-21 RX ADMIN — Medication 1000 MILLIGRAM(S): at 04:45

## 2022-11-21 RX ADMIN — Medication 105 MILLIGRAM(S): at 14:17

## 2022-11-21 NOTE — DIETITIAN INITIAL EVALUATION ADULT - REASON INDICATOR FOR ASSESSMENT
Assessment indicated for ICU length of stay   Sources: EMR, interdisciplinary huddle, pt's daughter, Marilyn, at bedside; pt with AMS/lethargy, unable to interview at this time   Chart reviewed, events noted.

## 2022-11-21 NOTE — PROGRESS NOTE ADULT - SUBJECTIVE AND OBJECTIVE BOX
NEUROCRITICAL CARE ATTENDING EVENING NOTE    BRIEF SUMMARY:  68yo woman adm for cerebellar ICH with IVH and hydrocephalus, requiring EVD placement and CSF diversion. c/b EtOH withdrawal requiring phenobarb and ativan PRN.     Ativan x3 throughout the day, lethargic now.     VITALS/IMAGING/DATA/IVF FLUIDS/MEDICATIONS: [x] Reviewed  MRI  Left cerebellar hemorrhage with intraventricular extension   and mild hydrocephalus. Right frontal ventricular catheter. Nonspecific   white matter changes likely representing nonacute ischemia. Differential   diagnosis for hemorrhage includes hypertension, multiple cavernomas and   amyloid angiopathy.      ALLERGIES: Allergies    Allergy Status Unknown    Intolerances      EXAMINATION:  General: No acute distress  HEENT: Anicteric sclerae  Cardiac: [id number]  Lungs: Clear  Abdomen: Soft, non-tender, +BS  Extremities: No c/c/e  Skin/Incision Site: Clean, dry and intact  Neurologic: lethargic, minimal EO to noxious, pupils 5mm briskly reactive b/l, no FC, ALARCON to stim   FS 87    ASSESSMENT: L cerebellar ICH with IVH    PLAN:  EVD in place 00kgP9P, monitor ICP, output  MRI as above, unchanged  CIWA protocol, vEEG x24hrs given fluctuating exam  MVI  delirium precaution  nicardipine gtt -160  unable to swallow due to mental status, PO meds not being given, if still lethargic in am, will need NGT for enteral access  Seizure prophylaxis: [x] not indicated but given EtOH, vEEG now   Thromboprophylaxis: [x] SCDs [x] chemoprophylaxis [] hold chemoprophylaxis due to: [] high risk of DVT/PE on admission due to:  Head of Bed/Activity: [x] 30 degrees [] mobilize as tolerated [] PT [] OT [] PMNR  Ulcer prophylaxis: [x] not indicated [] PPI [] other:  Glucose Control: Goal -180 87 now, give 1/2 amp D50, given NPO, recheck     [x] Patient critically ill due to: cerebellar ICH, IVH, hydrocephalus requiring CSF diversion   son/daughter updated at bedside     Contact: [phone] NEUROCRITICAL CARE ATTENDING EVENING NOTE    BRIEF SUMMARY:  68yo woman adm for cerebellar ICH with IVH and hydrocephalus, requiring EVD placement and CSF diversion. c/b EtOH withdrawal requiring phenobarb and ativan PRN.   11/20 Ativan x3 throughout the day, lethargic now.     24HR EVENTS: 1x mg ativan given this am, started on dexmedetomidine for agitation  EVD raised to [id number]    VITALS/IMAGING/DATA/IVF FLUIDS/MEDICATIONS: [x] Reviewed  MRI  Left cerebellar hemorrhage with intraventricular extension   and mild hydrocephalus. Right frontal ventricular catheter. Nonspecific   white matter changes likely representing nonacute ischemia. Differential   diagnosis for hemorrhage includes hypertension, multiple cavernomas and   amyloid angiopathy.      ALLERGIES: Allergies    Allergy Status Unknown    Intolerances      EXAMINATION:  General: mildly agitated   HEENT: Anicteric sclerae  Cardiac: [id number]  Lungs: Clear  Abdomen: Soft, non-tender, +BS  Extremities: No c/c/e  Skin/Incision Site: Clean, dry and intact  Neurologic: EO spont, speaks some words, but not answering orientation questions properly, follows some simple commands with prompts, pupils 5mm briskly reactive b/l, BUE at least 4/5--has some resistance to confrontation, BLE at least 3/5     ASSESSMENT: L cerebellar ICH with IVH    PLAN:  1mg ativan now, wean precedex as able   EVD in place [id number], monitor ICP, output  MRI as above, unchanged  CIWA protocol, vEEG x24hrs given fluctuating exam  MVI  delirium precaution  now swallowing tolerated dinner, resume captopril, off nicardipine drip now, -160  Seizure prophylaxis: [x] not indicated but given EtOH, vEEG now   Thromboprophylaxis: [x] SCDs [x] chemoprophylaxis [] hold chemoprophylaxis due to: [] high risk of DVT/PE on admission due to:  Head of Bed/Activity: [x] 30 degrees [] mobilize as tolerated [] PT [] OT [] PMNR  Ulcer prophylaxis: [x] not indicated [] PPI [] other:  Glucose Control: Goal -180     [x] Patient critically ill due to: cerebellar ICH, IVH, hydrocephalus requiring CSF diversion   son/daughter updated at bedside     Contact: [phone]

## 2022-11-21 NOTE — PROGRESS NOTE ADULT - ASSESSMENT
HPI:  67F with hx of HTN, HLD, early dementia, etoh use disorder (last drink 11/16), with reported recent fall, who presented to [facility] for AMS, CTH found to ahve cerebellar IPH with IVH and casting of 4th/3rd vent, ventriculomegaly, CTA without evidence of vascular malformation, txe to [facility] and in ED noted to have change in mental status and urgent EVD was placed in ED (AOx3>1), admitted for further management.     NEURO:  - Neuorchecks Q2H  - CTA H&N- with no evidence of vascular malformation per radiology; CTH AM; stability scan comparred to OSH scan in PACS  - MRi brain with and w/o contrast  - SOCrani watch  - EVD at 10 and open, ICP <10  - CIWA protocol as the patient may be withdrawing from EtOH- last drink was 11/16; MVT; phenobarb 260 load x1 for tongue fasciculations in the s/o etoh withdrawal  - Stroke work up - stroke neurology on board  - Low B12- 295, supplemented with 1000 mcg of vitamin B12 x1, folate levels >2    PULM:  - RA  - Aspiration precautions  - IS    CV:  - SBP goal 100-160 mmHg  - History of hypertension and dyslipidemia  - Will obtain TTE  - joy  - captopril 25q8h    RENAL:  - Fluids: IVL  - Na goal 140-145    GI:  - Diet: Easy to chew  - GI prophylaxis [x] not indicated [] PPI [] other:  - Bowel regimen [x] Miralax [x] senna [] other:  - LBM prior to admission     ENDO:   - Goal euglycemia (-180)  - A1c 5.6    HEME/ONC:  - H/H stable  - VTE prophylaxis: SCDs  - 11/20- Will start SQL    ID:  - Afebrile, no leukocytosis HPI:  67F with hx of HTN, HLD, early dementia, etoh use disorder (last drink 11/16), with reported recent fall, who presented to [facility] for AMS, CTH found to ahve cerebellar IPH with IVH and casting of 4th/3rd vent, ventriculomegaly, CTA without evidence of vascular malformation, txe to [facility] and in ED noted to have change in mental status and urgent EVD was placed in ED (AOx3>1), admitted for further management.   Poss HTN bleed    NEURO:  - Neuorchecks Q1H for clamp trial  - CTA H&N- with no evidence of vascular malformation per radiology; CTH AM; stability scan comparred to OSH scan in PACS  - MRi brain with and w/o contrast   check veeg  - SOCrani watch  - EVD was at 10 and open, ICP <10 will clamp 9:30  - CIWA protocol as the patient may be withdrawing from EtOH- last drink was 11/16; MVT; phenobarb 260 load x1 for tongue fasciculations in the s/o etoh withdrawal  - Stroke work up - stroke neurology on board  - Low B12- 295, supplemented with 1000 mcg of vitamin B12 x1, folate levels >2  DC melatonin    PULM:  - RA  - Aspiration precautions  - IS    CV:  - SBP goal 100-160 mmHg, off cardene  - History of hypertension and dyslipidemia  -  TTE w ef 80%  - joy  - captopril 25q8h w holding parameters    RENAL:  - Fluids: ns 50  - Na goal 135-145    GI:  - Diet: NPO and remains lethargic. will get NGT, start TF if ok w nsgy  - GI prophylaxis [x] not indicated [] PPI [] other:  - Bowel regimen [x] Miralax [x] senna [] other:  - LBM prior to admission     ENDO:   - Goal euglycemia (-180)  - A1c 5.6    HEME/ONC:  - H/H stable  - VTE prophylaxis: SCDs  - 11/20- SQL  LED pending    ID:  - Afebrile, no leukocytosis HPI:  67F with hx of HTN, HLD, early dementia, etoh use disorder (last drink 11/16), with reported recent fall, who presented to [facility] for AMS, CTH found to ahve cerebellar IPH with IVH and casting of 4th/3rd vent, ventriculomegaly, CTA without evidence of vascular malformation, txe to [facility] and in ED noted to have change in mental status and urgent EVD was placed in ED (AOx3>1), admitted for further management.   Poss HTN bleed    NEURO:  - Neuorchecks Q2H , raise evd to 15 per nsgy  - CTA H&N- with no evidence of vascular malformation per radiology; CTH AM; stability scan comparred to OSH scan in PACS  - MRi brain with and w/o contrast   check veeg  - SOCrani watch  - EVD was at 10 and open, ICP <10   - CIWA protocol as the patient may be withdrawing from EtOH- last drink was 11/16; MVT; phenobarb 260 load x1 for tongue fasciculations in the s/o etoh withdrawal  - Stroke work up - stroke neurology on board  - Low B12- 295, supplemented with 1000 mcg of vitamin B12 x1, folate levels >2  DC melatonin    PULM:  - RA  - Aspiration precautions  - IS    CV:  - SBP goal 100-160 mmHg, off cardene  - History of hypertension and dyslipidemia  -  TTE w ef 80%  - joy  - captopril 25q8h w holding parameters    RENAL:  - Fluids: ns 50  - Na goal 135-145    GI:  - Diet: NPO and remains lethargic. will get NGT, start TF if ok w nsgy  - GI prophylaxis [x] not indicated [] PPI [] other:  - Bowel regimen [x] Miralax [x] senna [] other:  - LBM prior to admission     ENDO:   - Goal euglycemia (-180)  - A1c 5.6    HEME/ONC:  - H/H stable  - VTE prophylaxis: SCDs  - 11/20- SQL  LED pending    ID:  - Afebrile, no leukocytosis 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, ventriculomegaly, CTA without evidence of vascular malformation  Poss HTN bleed    NEURO:  - Neuorchecks Q2H   - CT head today stable   - hydrocephalus, EVD raised to 15 cm water , output 112 ml in 24 hr  - MRi brain done r/o HTN, vs CAA   - EEG no seizures    - CIWA protocol for alcohol withdrawal, ativan PRN   - Stroke work up - stroke neurology on board  - on thiamine     PULM:  - RA      CV:  - HTN  SBP goal 100-160 mmHg, on captopril 25 mg q 8 hr   -  TTE w ef 80%  - joy      RENAL:  - Fluids: ns 50 while NPO  - Na goal 135-145    GI:  - Diet: NPO and remains lethargic. will get NGT, start TF if ok w nsgy  - GI prophylaxis [x] not indicated [] PPI [] other:  - Bowel regimen [x] Miralax [x] senna [] other:  - LBM prior to admission but has been NPO     ENDO:   - Goal euglycemia (-180)  - A1c 5.6    HEME/ONC:  - H/H stable  - VTE prophylaxis: SCDs  - lovenox 40 mg sc qhs       ID:  afebrile     35 crtiical care time

## 2022-11-21 NOTE — PROGRESS NOTE ADULT - SUBJECTIVE AND OBJECTIVE BOX
NSICU Progress Note:      24 HOUR EVENTS:   - admitted overnight, EVD placed for worsening mentation (AOx3 baseline>AOx1)  - EVD@10, no ICP issues      ICU Vital Signs Last 24 Hrs:    T(C): 37.3 (21 Nov 2022 03:00), Max: 37.3 (20 Nov 2022 13:15)  T(F): 99.1 (21 Nov 2022 03:00), Max: 99.2 (20 Nov 2022 13:15)  HR: 64 (21 Nov 2022 06:15) (60 - 107)  BP: --  BP(mean): --  ABP: 126/56 (21 Nov 2022 06:15) (81/61 - 161/75)  ABP(mean): 79 (21 Nov 2022 06:15) (69 - 106)  RR: 15 (21 Nov 2022 06:15) (13 - 25)  SpO2: 98% (21 Nov 2022 06:15) (92% - 100%)    I&O's Summary    19 Nov 2022 07:01  -  20 Nov 2022 07:00  --------------------------------------------------------  IN: 3153.7 mL / OUT: 3460 mL / NET: -306.3 mL    20 Nov 2022 07:01  -  21 Nov 2022 06:35  --------------------------------------------------------  IN: 1572.5 mL / OUT: 1940 mL / NET: -367.5 mL        Labs/Meds Reviewed    PHYSICAL EXAM:    General: calm  CVS: RRR  Pulm: CTAB  GI: Soft, NTND  Extremities: No LE Edema  Neuro: awake, agitated, oriented x0-1 (fluctuates), Non-cooperative, EOMI, no facial,  ALARCON spont/AG               NSICU Progress Note:      24 HOUR EVENTS:   - admitted overnight, EVD placed for worsening mentation (AOx3 baseline>AOx1)  - EVD@10, no ICP issues        T(C): 37.3 (11-21-22 @ 03:00), Max: 37.3 (11-20-22 @ 13:15)  HR: 60 (11-21-22 @ 07:00) (60 - 107)  BP: --  RR: 14 (11-21-22 @ 07:00) (13 - 25)  SpO2: 98% (11-21-22 @ 07:00) (92% - 100%)  11-20-22 @ 07:01  -  11-21-22 @ 07:00  --------------------------------------------------------  IN: 1572.5 mL / OUT: 1942 mL / NET: -369.5 mL    captopril 25 milliGRAM(s) Oral every 8 hours  chlorhexidine 4% Liquid 1 Application(s) Topical <User Schedule>  enoxaparin Injectable 40 milliGRAM(s) SubCutaneous <User Schedule>  folic acid 1 milliGRAM(s) Oral daily  LORazepam   Injectable 1 milliGRAM(s) IV Push every 2 hours PRN  melatonin 5 milliGRAM(s) Oral at bedtime  multivitamin/minerals/iron Oral Solution (CENTRUM) 15 milliLiter(s) Oral daily  niCARdipine Infusion 5 mG/Hr IV Continuous <Continuous>  polyethylene glycol 3350 17 Gram(s) Oral two times a day  senna 2 Tablet(s) Oral at bedtime  sodium chloride 0.9%. 1000 milliLiter(s) IV Continuous <Continuous>  thiamine IVPB 500 milliGRAM(s) IV Intermittent every 8 hours          Labs/Meds Reviewed    PHYSICAL EXAM:    General: calm  CVS: RRR  Pulm: CTAB  GI: Soft, NTND  Extremities: No LE Edema  Neuro: awake, agitated, oriented x0-1 (fluctuates), Non-cooperative, EOMI, no facial,  ALARCON spont/AG               NSICU Progress Note:      24 HOUR EVENTS:   -  worsening mentation (AOx3 baseline>AOx1) after ativan last evening 8 pm (not documented)  - EVD@10, MRI w stable hydro/heme  mental status remains worse than baseline        T(C): 37.3 (11-21-22 @ 03:00), Max: 37.3 (11-20-22 @ 13:15)  HR: 60 (11-21-22 @ 07:00) (60 - 107)  BP: --  RR: 14 (11-21-22 @ 07:00) (13 - 25)  SpO2: 98% (11-21-22 @ 07:00) (92% - 100%)  11-20-22 @ 07:01  -  11-21-22 @ 07:00  --------------------------------------------------------  IN: 1572.5 mL / OUT: 1942 mL / NET: -369.5 mL    captopril 25 milliGRAM(s) Oral every 8 hours  chlorhexidine 4% Liquid 1 Application(s) Topical <User Schedule>  enoxaparin Injectable 40 milliGRAM(s) SubCutaneous <User Schedule>  folic acid 1 milliGRAM(s) Oral daily  LORazepam   Injectable 1 milliGRAM(s) IV Push every 2 hours PRN  melatonin 5 milliGRAM(s) Oral at bedtime  multivitamin/minerals/iron Oral Solution (CENTRUM) 15 milliLiter(s) Oral daily  niCARdipine Infusion 5 mG/Hr IV Continuous <Continuous>  polyethylene glycol 3350 17 Gram(s) Oral two times a day  senna 2 Tablet(s) Oral at bedtime  sodium chloride 0.9%. 1000 milliLiter(s) IV Continuous <Continuous>  thiamine IVPB 500 milliGRAM(s) IV Intermittent every 8 hours          Labs/Meds Reviewed    PHYSICAL EXAM:    General: calm  CVS: RRR  Pulm: CTAB  GI: Soft, NTND  Extremities: No LE Edema  Neuro: awake, drowsy, dysarthric oriented x1 and says she is in [place], (fluctuates) PERRL, Non-cooperative, EOMI, no facial,  ALARCON spont/AG               NSICU Progress Note:      24 HOUR EVENTS:   CT head stable today , improved hydro         T(C): 37.3 (11-21-22 @ 03:00), Max: 37.3 (11-20-22 @ 13:15)  HR: 60 (11-21-22 @ 07:00) (60 - 107)  BP: --  RR: 14 (11-21-22 @ 07:00) (13 - 25)  SpO2: 98% (11-21-22 @ 07:00) (92% - 100%)  11-20-22 @ 07:01  -  11-21-22 @ 07:00  --------------------------------------------------------  IN: 1572.5 mL / OUT: 1942 mL / NET: -369.5 mL    captopril 25 milliGRAM(s) Oral every 8 hours  chlorhexidine 4% Liquid 1 Application(s) Topical <User Schedule>  enoxaparin Injectable 40 milliGRAM(s) SubCutaneous <User Schedule>  folic acid 1 milliGRAM(s) Oral daily  LORazepam   Injectable 1 milliGRAM(s) IV Push every 2 hours PRN  melatonin 5 milliGRAM(s) Oral at bedtime  multivitamin/minerals/iron Oral Solution (CENTRUM) 15 milliLiter(s) Oral daily  niCARdipine Infusion 5 mG/Hr IV Continuous <Continuous>  polyethylene glycol 3350 17 Gram(s) Oral two times a day  senna 2 Tablet(s) Oral at bedtime  sodium chloride 0.9%. 1000 milliLiter(s) IV Continuous <Continuous>  thiamine IVPB 500 milliGRAM(s) IV Intermittent every 8 hours          Labs/Meds Reviewed    PHYSICAL EXAM:    General: calm  CVS: RRR  Pulm: CTAB  GI: Soft, NTND  Extremities: No LE Edema  Neuro: awake, drowsy, dysarthric oriented x1 and says she is in [place], (fluctuates) PERRL, Non-cooperative, EOMI, no facial,  ALARCON spont/AG          LABS:  Na: 137 (11-20 @ 14:44), 137 (11-19 @ 22:15), 139 (11-18 @ 22:37), 143 (11-18 @ 18:21)  K: 3.5 (11-20 @ 14:44), 3.3 (11-19 @ 22:15), 3.5 (11-18 @ 22:37), 3.6 (11-18 @ 18:21)  Cl: 101 (11-20 @ 14:44), 104 (11-19 @ 22:15), 101 (11-18 @ 22:37), 104 (11-18 @ 18:21)  CO2: 24 (11-20 @ 14:44), 24 (11-19 @ 22:15), 25 (11-18 @ 22:37), 27 (11-18 @ 18:21)  BUN: 6 (11-20 @ 14:44), 7 (11-19 @ 22:15), 7 (11-18 @ 22:37), 7 (11-18 @ 18:21)  Cr: 0.44 (11-20 @ 14:44), 0.55 (11-19 @ 22:15), 0.55 (11-18 @ 22:37), 0.57 (11-18 @ 18:21)  Glu: 92(11-20 @ 14:44), 107(11-19 @ 22:15), 123(11-18 @ 22:37), 110(11-18 @ 18:21)    Hgb: 12.5 (11-19 @ 22:15), 15.6 (11-18 @ 22:37), 15.3 (11-18 @ 18:21)  Hct: 35.1 (11-19 @ 22:15), 44.8 (11-18 @ 22:37), 44.6 (11-18 @ 18:21)  WBC: 7.77 (11-19 @ 22:15), 7.52 (11-18 @ 22:37), 6.27 (11-18 @ 18:21)  Plt: 189 (11-19 @ 22:15), 223 (11-18 @ 22:37), 212 (11-18 @ 18:21)    INR: 1.09 11-18-22 @ 18:21  PTT: 27.6 11-18-22 @ 18:21

## 2022-11-21 NOTE — DIETITIAN INITIAL EVALUATION ADULT - PERTINENT MEDS FT
MEDICATIONS  (STANDING):  captopril  folic acid  multivitamin - Centrum  miralax  senna  IV sodium chloride 0.9%  thiamine

## 2022-11-21 NOTE — DIETITIAN INITIAL EVALUATION ADULT - NSFNSGIASSESSMENTFT_GEN_A_CORE
Per chart, no BM noted, daughter confirmed, unable to provide date of last BM, denies diarrhea. Reported pt with vomiting x 2 times on 11/17, though subsided since.  Per chart, no BM noted, daughter confirmed, unable to provide date of last BM, denies diarrhea. Pt on miralax and senna in-house. Reported pt with vomiting x 2 times on 11/17, though subsided since.

## 2022-11-21 NOTE — DIETITIAN INITIAL EVALUATION ADULT - ETIOLOGY
related to inability to meet increased nutrient needs related to increased physiological demand for nutrients

## 2022-11-21 NOTE — OCCUPATIONAL THERAPY INITIAL EVALUATION ADULT - BED MOBILITY TRAINING, PT EVAL
GOAL: Pt will complete all bed mobility independently to improve functional abilities within 4 weeks.

## 2022-11-21 NOTE — OCCUPATIONAL THERAPY INITIAL EVALUATION ADULT - PERTINENT HX OF CURRENT PROBLEM, REHAB EVAL
67F Hx HTN HLD, presented to  w/dizziness & AMS since AM of 11/17. Patient reportedly early dementia (AOx3 bl). No AC/AP. 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 Right frontal ventricular catheter. Nonspecific   white matter changes likely representing nonacute ischemia. Differential   diagnosis for hemorrhage includes hypertension, multiple cavernomas and   amyloid angiopathy. 67F Hx HTN HLD, presented to  w/dizziness & AMS since AM of 11/17. Patient reportedly early dementia (AOx3 bl). No AC/AP. 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 Right frontal ventricular catheter. Nonspecific white matter changes likely representing nonacute ischemia. Differential diagnosis for hemorrhage includes hypertension, multiple cavernomas and amyloid angiopathy.

## 2022-11-21 NOTE — OCCUPATIONAL THERAPY INITIAL EVALUATION ADULT - LIVES WITH, PROFILE
As per daughter at bedside, pt lives [place] 3 children in [place], [address]STE[place]15 to bedroom. PTA pt was independent with all ADLS/abilities, no specific dx of Alzheimers however daughter stating pt can be forgetful at times. Has not drove in past 4 yrs.

## 2022-11-21 NOTE — DIETITIAN INITIAL EVALUATION ADULT - ENERGY INTAKE
Pt was on a regular, easy to chew/thin liquid diet per SLP recommendation on 11/19, now NPO since 11/20 for SOC watch. Per NSICU attending note 11/20, pt "unable to swallow due to mental status, PO meds not being given, if still lethargic in am, will need NGT for enteral access." See enteral recommendations below in setting of NGT feeds if necessary. Pt NPO as on SOC watch/Currently NPO < 5 days

## 2022-11-21 NOTE — CHART NOTE - NSCHARTNOTEFT_GEN_A_CORE
EEG preliminary read (not final) on the initial recording hour(s) = x 1.5    No seizures recorded.  Moderate slowing noted, nonspecific.    [facility] EEG Reading Room Ph#: [phone]  Epilepsy Answering Service after 5PM and before 8:30AM: Ph#: [phone]

## 2022-11-21 NOTE — PROVIDER CONTACT NOTE (HYPOGLYCEMIA EVENT) - NS PROVIDER CONTACT BACKGROUND-HYPO
Age: 67y    Gender: Female    POCT Blood Glucose:  175 mg/dL (11-20-22 @ 21:51)  87 mg/dL (11-20-22 @ 21:06)      eMAR:dextrose 50% Injectable   25 milliLiter(s) IV Push (11-20-22 @ 21:35)

## 2022-11-21 NOTE — DIETITIAN INITIAL EVALUATION ADULT - NSFNSGIIOFT_GEN_A_CORE
I&O's Detail    20 Nov 2022 07:01  -  21 Nov 2022 07:00  --------------------------------------------------------  IN:    IV PiggyBack: 400 mL    NiCARdipine: 322.5 mL    sodium chloride 0.9%: 850 mL  Total IN: 1572.5 mL    OUT:    External Ventricular Device (mL): 112 mL    Intermittent Catheterization - Urethral (mL): 1830 mL  Total OUT: 1942 mL    Total NET: -369.5 mL      21 Nov 2022 07:01  -  21 Nov 2022 10:46  --------------------------------------------------------  IN:    sodium chloride 0.9%: 100 mL  Total IN: 100 mL    OUT:    External Ventricular Device (mL): 0 mL    NiCARdipine: 0 mL  Total OUT: 0 mL    Total NET: 100 mL

## 2022-11-21 NOTE — DIETITIAN INITIAL EVALUATION ADULT - REASON FOR ADMISSION
Nontraumatic intracerebral hemorrhage    Per chart, this is a 67-year-old female with a PMHx of HTN, HLD, early dementia, and EtOH use disorder, presented to [facility] with dizziness and AMS x 1 day. 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 Transferred to [facility] and received urgent EVD in ED due to AMS.

## 2022-11-21 NOTE — DIETITIAN INITIAL EVALUATION ADULT - ADD RECOMMEND
1) Defer diet advancement to team, defer texture/consistency to SLP  2) Enteral recommendations provided above if needed, RD available to adjust EN formula, rate, and volume prn  3) Continue with multivitamin, folic acid, and thiamine daily  4) Monitor PO intake or EN tolerance, labs, hydration, skin integrity and wt  1) Defer diet advancement to team, defer texture/consistency to SLP  2) Enteral recommendations provided above if needed, RD available to adjust EN formula, rate, and volume prn  3) Continue with multivitamin, folic acid, and thiamine daily  4) Monitor PO intake or EN tolerance when able to feed, monitor labs, hydration, skin integrity and wt

## 2022-11-21 NOTE — DIETITIAN INITIAL EVALUATION ADULT - ENTERAL
If to begin NGT feeds, recommend initiate Jevity 1.2 @ 10 mL/hr titrate up 10mL Q 6 hours until goal rate of 50 mL/hr x 24 hours, provides 1200 mL formula/day, 1440 kcal/day (27 kcal/kg based on dosing wt), 67 g protein/day (1.2 g pro/kg based on dosing wt), 968 mL free water, defer free water flushes to team.

## 2022-11-21 NOTE — OCCUPATIONAL THERAPY INITIAL EVALUATION ADULT - GENERAL OBSERVATIONS, REHAB EVAL
Pt received semi supine in bed, +EEG, +EVD clamped, +B/L wrist restraints, +tele/pulse ox, +LIONEL, +purewick.

## 2022-11-21 NOTE — DIETITIAN INITIAL EVALUATION ADULT - OTHER INFO
Blood glucose levels dropped to 87mg/dL (11/20), goal 120-180, gave IV dextrose 50%, rechecked at 175mg/dL, team to continue to monitor.  Pt with hypercholesterolemia, daughter aware, noted pt watches fat intake at home.  Wt hx: daughter reported -115 pounds, denies any recent wt changes. Dosing wt this admission 116 pounds (11/18 - stated). Blood glucose levels dropped to 87mg/dL (11/20), goal 120-180, gave IV dextrose 50%, rechecked at 175mg/dL, team to continue to monitor.  Pt with hypercholesterolemia, daughter aware, noted pt watches fat intake at home.  Wt hx: daughter reported UBW of 110-115 pounds, denies any recent wt changes. Dosing wt this admission 116 pounds (11/18 - stated).

## 2022-11-21 NOTE — PHARMACOTHERAPY INTERVENTION NOTE - COMMENTS
Spoke with patient's daughter Marilyn and patient's medication bottles were from 2019 and full. Daughter stated patient non-compliant with her medications and confirmed NKDA none

## 2022-11-21 NOTE — DIETITIAN INITIAL EVALUATION ADULT - FACTORS AFF FOOD INTAKE
change in mental status/difficulty swallowing change in mental status/difficulty swallowing/vomiting

## 2022-11-21 NOTE — DIETITIAN INITIAL EVALUATION ADULT - NSFNSPHYEXAMSKINFT_GEN_A_CORE
Per flowsheet, no pressure injuries, R head surgical incision noted Per flowsheet, no pressure injuries, R head surgical incision noted (EVD)

## 2022-11-22 PROCEDURE — 99291 CRITICAL CARE FIRST HOUR: CPT

## 2022-11-22 PROCEDURE — 95718 EEG PHYS/QHP 2-12 HR W/VEEG: CPT

## 2022-11-22 PROCEDURE — 70450 CT HEAD/BRAIN W/O DYE: CPT | Mod: 26

## 2022-11-22 RX ORDER — SODIUM CHLORIDE 9 MG/ML
1000 INJECTION INTRAMUSCULAR; INTRAVENOUS; SUBCUTANEOUS
Refills: 0 | Status: DISCONTINUED | OUTPATIENT
Start: 2022-11-22 | End: 2022-11-23

## 2022-11-22 RX ORDER — ACETAMINOPHEN 500 MG
1000 TABLET ORAL ONCE
Refills: 0 | Status: COMPLETED | OUTPATIENT
Start: 2022-11-22 | End: 2022-11-22

## 2022-11-22 RX ORDER — HYDRALAZINE HCL 50 MG
5 TABLET ORAL ONCE
Refills: 0 | Status: COMPLETED | OUTPATIENT
Start: 2022-11-22 | End: 2022-11-22

## 2022-11-22 RX ORDER — LISINOPRIL 2.5 MG/1
20 TABLET ORAL
Refills: 0 | Status: DISCONTINUED | OUTPATIENT
Start: 2022-11-22 | End: 2022-11-23

## 2022-11-22 RX ORDER — QUETIAPINE FUMARATE 200 MG/1
12.5 TABLET, FILM COATED ORAL EVERY 6 HOURS
Refills: 0 | Status: DISCONTINUED | OUTPATIENT
Start: 2022-11-22 | End: 2022-11-29

## 2022-11-22 RX ORDER — THIAMINE MONONITRATE (VIT B1) 100 MG
100 TABLET ORAL ONCE
Refills: 0 | Status: COMPLETED | OUTPATIENT
Start: 2022-11-22 | End: 2022-11-22

## 2022-11-22 RX ADMIN — Medication 1000 MILLIGRAM(S): at 16:00

## 2022-11-22 RX ADMIN — POTASSIUM PHOSPHATE, MONOBASIC POTASSIUM PHOSPHATE, DIBASIC 62.5 MILLIMOLE(S): 236; 224 INJECTION, SOLUTION INTRAVENOUS at 00:49

## 2022-11-22 RX ADMIN — Medication 1 MILLIGRAM(S): at 08:00

## 2022-11-22 RX ADMIN — Medication 5 MILLIGRAM(S): at 16:11

## 2022-11-22 RX ADMIN — QUETIAPINE FUMARATE 12.5 MILLIGRAM(S): 200 TABLET, FILM COATED ORAL at 22:48

## 2022-11-22 RX ADMIN — POLYETHYLENE GLYCOL 3350 17 GRAM(S): 17 POWDER, FOR SOLUTION ORAL at 05:21

## 2022-11-22 RX ADMIN — CHLORHEXIDINE GLUCONATE 1 APPLICATION(S): 213 SOLUTION TOPICAL at 22:35

## 2022-11-22 RX ADMIN — Medication 400 MILLIGRAM(S): at 15:30

## 2022-11-22 RX ADMIN — Medication 100 MILLIGRAM(S): at 15:01

## 2022-11-22 RX ADMIN — Medication 5 MILLIGRAM(S): at 22:48

## 2022-11-22 RX ADMIN — Medication 1 MILLIGRAM(S): at 00:56

## 2022-11-22 RX ADMIN — SODIUM CHLORIDE 50 MILLILITER(S): 9 INJECTION INTRAMUSCULAR; INTRAVENOUS; SUBCUTANEOUS at 20:28

## 2022-11-22 RX ADMIN — CAPTOPRIL 25 MILLIGRAM(S): 12.5 TABLET ORAL at 05:21

## 2022-11-22 RX ADMIN — Medication 40 MILLIEQUIVALENT(S): at 00:56

## 2022-11-22 RX ADMIN — Medication 105 MILLIGRAM(S): at 05:21

## 2022-11-22 RX ADMIN — SODIUM CHLORIDE 75 MILLILITER(S): 9 INJECTION INTRAMUSCULAR; INTRAVENOUS; SUBCUTANEOUS at 22:47

## 2022-11-22 NOTE — PROGRESS NOTE ADULT - ASSESSMENT
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, ventriculomegaly, CTA without evidence of vascular malformation  Poss HTN bleed    NEURO:  - Neuorchecks Q2H   - CT head today stable   - hydrocephalus, EVD raised to 15 cm water , output 112 ml in 24 hr  - MRi brain done r/o HTN, vs CAA   - EEG no seizures    - CIWA protocol for alcohol withdrawal, ativan PRN   - Stroke work up - stroke neurology on board  - on thiamine     PULM:  - RA      CV:  - HTN  SBP goal 100-160 mmHg, on captopril 25 mg q 8 hr   -  TTE w ef 80%  - joy      RENAL:  - Fluids: ns 50 while NPO  - Na goal 135-145    GI:  - Diet: NPO and remains lethargic. will get NGT, start TF if ok w nsgy  - GI prophylaxis [x] not indicated [] PPI [] other:  - Bowel regimen [x] Miralax [x] senna [] other:  - LBM prior to admission but has been NPO     ENDO:   - Goal euglycemia (-180)  - A1c 5.6    HEME/ONC:  - H/H stable  - VTE prophylaxis: SCDs  - lovenox 40 mg sc qhs       ID:  afebrile     35 crtiical care time  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, ventriculomegaly, CTA without evidence of vascular malformation  Poss HTN bleed    NEURO:  - Neuorchecks Q1H   - CT head today stable will clamp at 11 am  - MRi brain done r/o HTN, vs CAA   - EEG no seizures   dc eeg  - CIWA protocol for alcohol withdrawal, ativan PRN   - Stroke work up - stroke neurology on board  - on thiamine     PULM:  - RA      CV:  - HTN  SBP goal 100-160 mmHg, on captopril 25 mg q 8 hr will switch to lisinopril  -  TTE w ef 80%  - joy      RENAL:  - Fluids: ns 50 while NPO  - Na goal 135-145    GI: calorie count  - Diet: easy to chew diet  - GI prophylaxis [x] not indicated [] PPI [] other:  - Bowel regimen [x] Miralax [x] senna [] other:  - LBM prior to admission but has been NPO     ENDO:   - Goal euglycemia (-180)  - A1c 5.6    HEME/ONC:  - H/H stable  - VTE prophylaxis: SCDs  - lovenox 40 mg sc qhs will check w nsgy if hold it tonight for poss evd pull      ID:  afebrile

## 2022-11-22 NOTE — EEG REPORT - NS EEG TEXT BOX
MCKENZIE MORELAND [facility]-[id number]     Study Date: 		11-21 09:32-08:00 11-22-22  Duration in hours:  x22.5    --------------------------------------------------------------------------------------------------  History:  CC/ HPI Patient is a 67y old  Female who presents with a chief complaint of ICH/IVH (22 Nov 2022 06:33)    MEDICATIONS  (STANDING):  captopril 25 milliGRAM(s) Oral every 8 hours  chlorhexidine 4% Liquid 1 Application(s) Topical <User Schedule>  dexMEDEtomidine Infusion 0.2 MICROgram(s)/kG/Hr (2.63 mL/Hr) IV Continuous <Continuous>  enoxaparin Injectable 40 milliGRAM(s) SubCutaneous <User Schedule>  folic acid 1 milliGRAM(s) Oral daily  multivitamin/minerals 1 Tablet(s) Oral daily  polyethylene glycol 3350 17 Gram(s) Oral two times a day  senna 2 Tablet(s) Oral at bedtime  sodium chloride 0.9%. 1000 milliLiter(s) (50 mL/Hr) IV Continuous <Continuous>  thiamine 100 milliGRAM(s) Oral daily    --------------------------------------------------------------------------------------------------  Study Interpretation:    [Abbreviation Key:  PDR=alpha rhythm/posterior dominant rhythm. A-P=anterior posterior.  Amplitude: ‘very low’:<20; ‘low’:20-49; ‘medium’:; ‘high’:>150uV.  Persistence for periodic/rhythmic patterns (% of epoch) ‘rare’:<1%; ‘occasional’:1-10%; ‘frequent’:10-50%; ‘abundant’:50-90%; ‘continuous’:>90%.  Persistence for sporadic discharges: ‘rare’:<1/hr; ‘occasional’:1/min-1/hr; ‘frequent’:>1/min; ‘abundant’:>1/10 sec.  RPP=rhythmic and periodic patterns; GRDA=generalized rhythmic delta activity; FIRDA=frontal intermittent GRDA; LRDA=lateralized rhythmic delta activity; TIRDA=temporal intermittent rhythmic delta activity;  LPD=PLED=lateralized periodic discharges; GPD=generalized periodic discharges; BIPDs =bilateral independent periodic discharges; Mf=multifocal; SIRPDs=stimulus induced rhythmic, periodic, or ictal appearing discharges; BIRDs=brief potentially ictal rhythmic discharges >4 Hz, lasting .5-10s; PFA (paroxysmal bursts >13 Hz or =8 Hz <10s).  Modifiers: +F=with fast component; +S=with spike component; +R=with rhythmic component.  S-B=burst suppression pattern.  Max=maximal. N1-drowsy; N2-stage II sleep; N3-slow wave sleep. SSS/BETS=small sharp spikes/benign epileptiform transients of sleep. HV=hyperventilation; PS=photic stimulation]    Daily EEG Visual Analysis  FINDINGS:      Background:  Continuity: continuous  Symmetry: symmetric  PDR: 8Hz activity, with amplitude to 30 uV, that attenuated to eye opening.    Reactivity: present  Voltage: normal (between 20-150uV)  Anterior Posterior Gradient: present  Other background findings: none  Breach: absent    Background Slowing:  Generalized slowing: diffuse irregular delta and theta activity.  Focal slowing: none was present.    State Changes:   -Drowsiness noted with increased slowing, attenuation of fast activity, vertex transients.  -Present with N2 sleep transients with symmetric spindles and K-complexes.    Sporadic Epileptiform Discharges:    None    Rhythmic and Periodic Patterns (RPPs):  None     Electrographic and Electroclinical seizures:  None    Other Clinical Events:  None    Activation Procedures:   -Hyperventilation was not performed.    -Photic stimulation was not performed.     Artifacts:  Intermittent myogenic and movement artifacts were noted.    ECG:  The heart rate on single channel ECG was predominantly between 60-70 BPM.    EEG Classification / Summary:  Abnormal EEG study  Moderate generalized background slowing    -----------------------------------------------------------------------------------------------------    Clinical Impression:  Moderate diffuse/multi-focal cerebral dysfunction, not specific as to etiology.  There were no epileptiform abnormalities recorded x 1 day.      -------------------------------------------------------------------------------------------------------  [facility] EEG Reading Room Ph#: [phone]  Epilepsy Answering Service after 5PM and before 8:30AM: Ph#: [phone]    Zaki Jackson M.D.   of Neurology, [facility]	   MCKENZIE MORELAND [facility]-[id number]     Study Date: 		11-21 09:32-12:48pm 11-22-22  Duration in hours:  x27:38    --------------------------------------------------------------------------------------------------  History:  CC/ HPI Patient is a 67y old  Female who presents with a chief complaint of ICH/IVH (22 Nov 2022 06:33)    MEDICATIONS  (STANDING):  captopril 25 milliGRAM(s) Oral every 8 hours  chlorhexidine 4% Liquid 1 Application(s) Topical <User Schedule>  dexMEDEtomidine Infusion 0.2 MICROgram(s)/kG/Hr (2.63 mL/Hr) IV Continuous <Continuous>  enoxaparin Injectable 40 milliGRAM(s) SubCutaneous <User Schedule>  folic acid 1 milliGRAM(s) Oral daily  multivitamin/minerals 1 Tablet(s) Oral daily  polyethylene glycol 3350 17 Gram(s) Oral two times a day  senna 2 Tablet(s) Oral at bedtime  sodium chloride 0.9%. 1000 milliLiter(s) (50 mL/Hr) IV Continuous <Continuous>  thiamine 100 milliGRAM(s) Oral daily    --------------------------------------------------------------------------------------------------  Study Interpretation:    [Abbreviation Key:  PDR=alpha rhythm/posterior dominant rhythm. A-P=anterior posterior.  Amplitude: ‘very low’:<20; ‘low’:20-49; ‘medium’:; ‘high’:>150uV.  Persistence for periodic/rhythmic patterns (% of epoch) ‘rare’:<1%; ‘occasional’:1-10%; ‘frequent’:10-50%; ‘abundant’:50-90%; ‘continuous’:>90%.  Persistence for sporadic discharges: ‘rare’:<1/hr; ‘occasional’:1/min-1/hr; ‘frequent’:>1/min; ‘abundant’:>1/10 sec.  RPP=rhythmic and periodic patterns; GRDA=generalized rhythmic delta activity; FIRDA=frontal intermittent GRDA; LRDA=lateralized rhythmic delta activity; TIRDA=temporal intermittent rhythmic delta activity;  LPD=PLED=lateralized periodic discharges; GPD=generalized periodic discharges; BIPDs =bilateral independent periodic discharges; Mf=multifocal; SIRPDs=stimulus induced rhythmic, periodic, or ictal appearing discharges; BIRDs=brief potentially ictal rhythmic discharges >4 Hz, lasting .5-10s; PFA (paroxysmal bursts >13 Hz or =8 Hz <10s).  Modifiers: +F=with fast component; +S=with spike component; +R=with rhythmic component.  S-B=burst suppression pattern.  Max=maximal. N1-drowsy; N2-stage II sleep; N3-slow wave sleep. SSS/BETS=small sharp spikes/benign epileptiform transients of sleep. HV=hyperventilation; PS=photic stimulation]    Daily EEG Visual Analysis  FINDINGS:      Background:  Continuity: continuous  Symmetry: symmetric  PDR: 8Hz activity, with amplitude to 30 uV, that attenuated to eye opening.    Reactivity: present  Voltage: normal (between 20-150uV)  Anterior Posterior Gradient: present  Other background findings: none  Breach: absent    Background Slowing:  Generalized slowing: diffuse irregular delta and theta activity.  Focal slowing: none was present.    State Changes:   -Drowsiness noted with increased slowing, attenuation of fast activity, vertex transients.  -Present with N2 sleep transients with symmetric spindles and K-complexes.    Sporadic Epileptiform Discharges:    None    Rhythmic and Periodic Patterns (RPPs):  None     Electrographic and Electroclinical seizures:  None    Other Clinical Events:  None    Activation Procedures:   -Hyperventilation was not performed.    -Photic stimulation was not performed.     Artifacts:  Intermittent myogenic and movement artifacts were noted.    ECG:  The heart rate on single channel ECG was predominantly between 60-70 BPM.    EEG Classification / Summary:  Abnormal EEG study  Moderate generalized background slowing    -----------------------------------------------------------------------------------------------------    Clinical Impression:  Moderate diffuse/multi-focal cerebral dysfunction, not specific as to etiology.  There were no epileptiform abnormalities recorded x 1 day.      -------------------------------------------------------------------------------------------------------  [facility] EEG Reading Room Ph#: [phone]  Epilepsy Answering Service after 5PM and before 8:30AM: Ph#: [phone]    Zaki Jackson M.D.   of Neurology, [facility]	   MCKENZIE MORELAND [facility]-[id number]     Study Date: 		11-21 09:32-12:48pm 11-22-22  Duration in hours:  x27:38    --------------------------------------------------------------------------------------------------  History:  CC/ HPI Patient is a 67y old  Female who presents with a chief complaint of ICH/IVH (22 Nov 2022 06:33)    MEDICATIONS  (STANDING):  captopril 25 milliGRAM(s) Oral every 8 hours  chlorhexidine 4% Liquid 1 Application(s) Topical <User Schedule>  dexMEDEtomidine Infusion 0.2 MICROgram(s)/kG/Hr (2.63 mL/Hr) IV Continuous <Continuous>  enoxaparin Injectable 40 milliGRAM(s) SubCutaneous <User Schedule>  folic acid 1 milliGRAM(s) Oral daily  multivitamin/minerals 1 Tablet(s) Oral daily  polyethylene glycol 3350 17 Gram(s) Oral two times a day  senna 2 Tablet(s) Oral at bedtime  sodium chloride 0.9%. 1000 milliLiter(s) (50 mL/Hr) IV Continuous <Continuous>  thiamine 100 milliGRAM(s) Oral daily    --------------------------------------------------------------------------------------------------  Study Interpretation:    [Abbreviation Key:  PDR=alpha rhythm/posterior dominant rhythm. A-P=anterior posterior.  Amplitude: ‘very low’:<20; ‘low’:20-49; ‘medium’:; ‘high’:>150uV.  Persistence for periodic/rhythmic patterns (% of epoch) ‘rare’:<1%; ‘occasional’:1-10%; ‘frequent’:10-50%; ‘abundant’:50-90%; ‘continuous’:>90%.  Persistence for sporadic discharges: ‘rare’:<1/hr; ‘occasional’:1/min-1/hr; ‘frequent’:>1/min; ‘abundant’:>1/10 sec.  RPP=rhythmic and periodic patterns; GRDA=generalized rhythmic delta activity; FIRDA=frontal intermittent GRDA; LRDA=lateralized rhythmic delta activity; TIRDA=temporal intermittent rhythmic delta activity;  LPD=PLED=lateralized periodic discharges; GPD=generalized periodic discharges; BIPDs =bilateral independent periodic discharges; Mf=multifocal; SIRPDs=stimulus induced rhythmic, periodic, or ictal appearing discharges; BIRDs=brief potentially ictal rhythmic discharges >4 Hz, lasting .5-10s; PFA (paroxysmal bursts >13 Hz or =8 Hz <10s).  Modifiers: +F=with fast component; +S=with spike component; +R=with rhythmic component.  S-B=burst suppression pattern.  Max=maximal. N1-drowsy; N2-stage II sleep; N3-slow wave sleep. SSS/BETS=small sharp spikes/benign epileptiform transients of sleep. HV=hyperventilation; PS=photic stimulation]    Daily EEG Visual Analysis  FINDINGS:      Background:  Continuity: continuous  Symmetry: symmetric  PDR: 8Hz activity, with amplitude to 30 uV, that attenuated to eye opening.    Reactivity: present  Voltage: normal (between 20-150uV)  Anterior Posterior Gradient: present  Other background findings: none  Breach: absent    Background Slowing:  Generalized slowing: diffuse irregular delta and theta activity.  Focal slowing: none was present.    State Changes:   -Drowsiness noted with increased slowing, attenuation of fast activity, vertex transients.  -Present with N2 sleep transients with symmetric spindles and K-complexes.    Sporadic Epileptiform Discharges:    None    Rhythmic and Periodic Patterns (RPPs):  None     Electrographic and Electroclinical seizures:  None    Other Clinical Events:  None    Activation Procedures:   -Hyperventilation was not performed.    -Photic stimulation was not performed.     Artifacts:  Intermittent myogenic and movement artifacts were noted.    ECG:  The heart rate on single channel ECG was predominantly between 60-70 BPM.    EEG Classification / Summary:  Abnormal EEG study  Moderate generalized background slowing    -----------------------------------------------------------------------------------------------------  Clinical Impression:  Moderate diffuse/multi-focal cerebral dysfunction, not specific as to etiology.  There were no epileptiform abnormalities recorded x 1 day.      -------------------------------------------------------------------------------------------------------  [facility] EEG Reading Room Ph#: [phone]  Epilepsy Answering Service after 5PM and before 8:30AM: Ph#: [phone]    Zaki Jackson M.D.   of Neurology, [facility]

## 2022-11-22 NOTE — PROGRESS NOTE ADULT - SUBJECTIVE AND OBJECTIVE BOX
Patient seen and examined at bedside.  EVD @ 15.     --Anticoagulation--  enoxaparin Injectable 40 milliGRAM(s) SubCutaneous <User Schedule>    T(C): 37.4 (11-21-22 @ 23:00), Max: 37.4 (11-21-22 @ 23:00)  HR: 76 (11-22-22 @ 01:00) (53 - 111)  BP: 156/75 (11-22-22 @ 01:00) (152/68 - 156/75)  RR: 14 (11-22-22 @ 01:00) (13 - 23)  SpO2: 98% (11-22-22 @ 01:00) (95% - 100%)  Wt(kg): --    Exam: lethargic, agitated/uncooperative at times, Ox1, PERRL, FC, ALARCON 5/5 no drift    .  LABS:                         13.1   8.83  )-----------( 191      ( 21 Nov 2022 22:41 )             36.3     11-21    135  |  99  |  8   ----------------------------<  99  3.5   |  23  |  0.42<L>    Ca    8.7      21 Nov 2022 22:41  Phos  2.4     11-21  Mg     2.0     11-21                RADIOLOGY, EKG & ADDITIONAL TESTS: Reviewed.

## 2022-11-22 NOTE — PROGRESS NOTE ADULT - ASSESSMENT
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, ventriculomegaly, CTA without evidence of vascular malformation  Poss HTN bleed    NEURO:  - Neuorchecks Q1H   - CT head today stable will clamp at 11 am  - MRi brain done r/o HTN, vs CAA   - EEG no seizures   dc eeg  - CIWA protocol for alcohol withdrawal, ativan PRN   - Stroke work up - stroke neurology on board  - on thiamine     PULM:  - RA      CV:  - HTN  SBP goal 100-160 mmHg, on captopril 25 mg q 8 hr will switch to lisinopril  -  TTE w ef 80%  - joy      RENAL:  - Fluids: ns 50 while NPO  - Na goal 135-145    GI: calorie count  - Diet: easy to chew diet  - GI prophylaxis [x] not indicated [] PPI [] other:  - Bowel regimen [x] Miralax [x] senna [] other:  - LBM prior to admission but has been NPO     ENDO:   - Goal euglycemia (-180)  - A1c 5.6    HEME/ONC:  - H/H stable  - VTE prophylaxis: SCDs  - lovenox 40 mg sc qhs will check w nsgy if hold it tonight for poss evd pull      ID:  afebrile    HPI: 67F with cerebellar IPH with IVH and casting of 4th/3rd vent, ventriculomegaly, CTA without evidence of vascular malformation. Poss HTN bleed    NEURO:  - Vitor Q1H   - CT head today stable EVD clamped at 11 am - ? poss EVD pull sha   - MRi brain done r/o HTN, vs CAA   - EEG no seizures   dc eeg  - CIWA protocol for alcohol withdrawal, ativan PRN   - Stroke work up - stroke neurology on board  - on thiamine & folic acid     PULM:  - RA    CV:  - HTN  SBP goal 100-160 mmHg,  lisinopril 20 qd   -  TTE w ef 80%  - joy    RENAL:  - Fluids: ns 75 while NPO  - Na goal 135-145    GI: calorie count  - Diet: easy to chew diet, pt has dec intake   - GI prophylaxis [x] not indicated [] PPI [] other:  - Bowel regimen [x] Miralax [x] senna [] other:  - LBM prior to admission but has been NPO     ENDO:   - Goal euglycemia (-180)  - A1c 5.6    HEME/ONC:  - H/H stable  - VTE prophylaxis: SCDs  - lovenox 40 mg sc qhs will check w nsgy if hold it tonight for poss evd pull    ID:  afebrile     SOCIAL/FAMILY:  [] awaiting [x] updated at bedside [] family meeting    CODE STATUS:  [x] Full Code [] DNR [] DNI [] Palliative/Comfort Care    DISPOSITION:  [x] ICU [] Stroke Unit [] Floor [] EMU [] RCU [] PCU    Patient is critically ill due to cerebellar ICH with IVH and at high risk for neurological deterioration or death due to: with hydrocephalus requiring an EVD for CSF diversion, undergoing a clamp trial.    Patient is critically ill, requiring critical care services.   Time spent: 45 critical care minutes    Contact: [phone]

## 2022-11-22 NOTE — PROGRESS NOTE ADULT - SUBJECTIVE AND OBJECTIVE BOX
NSCU progress note:      24 HOUR EVENTS:   CT head stable today , improved hydro     captopril 25 milliGRAM(s) Oral every 8 hours  chlorhexidine 4% Liquid 1 Application(s) Topical <User Schedule>  enoxaparin Injectable 40 milliGRAM(s) SubCutaneous <User Schedule>  folic acid 1 milliGRAM(s) Oral daily  LORazepam   Injectable 1 milliGRAM(s) IV Push every 2 hours PRN  melatonin 5 milliGRAM(s) Oral at bedtime  multivitamin/minerals/iron Oral Solution (CENTRUM) 15 milliLiter(s) Oral daily  niCARdipine Infusion 5 mG/Hr IV Continuous <Continuous>  polyethylene glycol 3350 17 Gram(s) Oral two times a day  senna 2 Tablet(s) Oral at bedtime  sodium chloride 0.9%. 1000 milliLiter(s) IV Continuous <Continuous>  thiamine IVPB 500 milliGRAM(s) IV Intermittent every 8 hours          Labs/Meds Reviewed    PHYSICAL EXAM:    General: calm  CVS: RRR  Pulm: CTAB  GI: Soft, NTND  Extremities: No LE Edema  Neuro: awake, drowsy, dysarthric oriented x1 and says she is in [place], (fluctuates) PERRL, Non-cooperative, EOMI, no facial,  ALARCON spont/AG                 NSCU Evening  progress note:  66yo woman adm for cerebellar ICH with IVH and hydrocephalus, requiring EVD placement and CSF diversion. c/b EtOH withdrawal requiring phenobarb and ativan PRN.     24 HOUR EVENTS:   CT head stable today, improved hydro. EVD clamped @11am     captopril 25 milliGRAM(s) Oral every 8 hours  chlorhexidine 4% Liquid 1 Application(s) Topical <User Schedule>  enoxaparin Injectable 40 milliGRAM(s) SubCutaneous <User Schedule>  folic acid 1 milliGRAM(s) Oral daily  LORazepam   Injectable 1 milliGRAM(s) IV Push every 2 hours PRN  melatonin 5 milliGRAM(s) Oral at bedtime  multivitamin/minerals/iron Oral Solution (CENTRUM) 15 milliLiter(s) Oral daily  niCARdipine Infusion 5 mG/Hr IV Continuous <Continuous>  polyethylene glycol 3350 17 Gram(s) Oral two times a day  senna 2 Tablet(s) Oral at bedtime  sodium chloride 0.9%. 1000 milliLiter(s) IV Continuous <Continuous>  thiamine IVPB 500 milliGRAM(s) IV Intermittent every 8 hours    Labs/Meds Reviewed    PHYSICAL EXAM:  General: calm  CVS: RRR  Pulm: CTAB  GI: Soft, NTND  Extremities: No LE Edema  Neuro: drowsy, dysarthric oriented x1 (fluctuates) PERRL, Non-cooperative and not following commands, EOMI, no facial,  ALARCON spont/AG

## 2022-11-22 NOTE — PROGRESS NOTE ADULT - ASSESSMENT
-Potential EVD clamp trial tomorrow   -SOC watch   -CIWA protocol   -VEEG in place for AMS  -LED pending

## 2022-11-22 NOTE — PROGRESS NOTE ADULT - SUBJECTIVE AND OBJECTIVE BOX
NSICU Progress Note:      24 HOUR EVENTS:   CT head stable today , improved hydro           captopril 25 milliGRAM(s) Oral every 8 hours  chlorhexidine 4% Liquid 1 Application(s) Topical <User Schedule>  enoxaparin Injectable 40 milliGRAM(s) SubCutaneous <User Schedule>  folic acid 1 milliGRAM(s) Oral daily  LORazepam   Injectable 1 milliGRAM(s) IV Push every 2 hours PRN  melatonin 5 milliGRAM(s) Oral at bedtime  multivitamin/minerals/iron Oral Solution (CENTRUM) 15 milliLiter(s) Oral daily  niCARdipine Infusion 5 mG/Hr IV Continuous <Continuous>  polyethylene glycol 3350 17 Gram(s) Oral two times a day  senna 2 Tablet(s) Oral at bedtime  sodium chloride 0.9%. 1000 milliLiter(s) IV Continuous <Continuous>  thiamine IVPB 500 milliGRAM(s) IV Intermittent every 8 hours          Labs/Meds Reviewed    PHYSICAL EXAM:    General: calm  CVS: RRR  Pulm: CTAB  GI: Soft, NTND  Extremities: No LE Edema  Neuro: awake, drowsy, dysarthric oriented x1 and says she is in [place], (fluctuates) PERRL, Non-cooperative, EOMI, no facial,  ALARCON spont/AG

## 2022-11-22 NOTE — PROVIDER CONTACT NOTE (CHANGE IN STATUS NOTIFICATION) - ASSESSMENT
Patient presents with increased ICP of 29 at 15:30pm. Patient agitated and restless at the time. Prior to increased ICU, patient was RASS -2. Neuro assessment unchanged.

## 2022-11-23 LAB
ANION GAP SERPL CALC-SCNC: 11 MMOL/L — SIGNIFICANT CHANGE UP (ref 5–17)
APPEARANCE UR: CLEAR — SIGNIFICANT CHANGE UP
BILIRUB UR-MCNC: NEGATIVE — SIGNIFICANT CHANGE UP
BUN SERPL-MCNC: 9 MG/DL — SIGNIFICANT CHANGE UP (ref 7–23)
CALCIUM SERPL-MCNC: 8.9 MG/DL — SIGNIFICANT CHANGE UP (ref 8.4–10.5)
CHLORIDE SERPL-SCNC: 99 MMOL/L — SIGNIFICANT CHANGE UP (ref 96–108)
CO2 SERPL-SCNC: 25 MMOL/L — SIGNIFICANT CHANGE UP (ref 22–31)
COLOR SPEC: YELLOW — SIGNIFICANT CHANGE UP
CREAT SERPL-MCNC: 0.47 MG/DL — LOW (ref 0.5–1.3)
DIFF PNL FLD: NEGATIVE — SIGNIFICANT CHANGE UP
EGFR: 106 ML/MIN/1.73M2 — SIGNIFICANT CHANGE UP
GLUCOSE SERPL-MCNC: 114 MG/DL — HIGH (ref 70–99)
GLUCOSE UR QL: NEGATIVE — SIGNIFICANT CHANGE UP
HCT VFR BLD CALC: 37.7 % — SIGNIFICANT CHANGE UP (ref 34.5–45)
HGB BLD-MCNC: 13.2 G/DL — SIGNIFICANT CHANGE UP (ref 11.5–15.5)
KETONES UR-MCNC: ABNORMAL
LEUKOCYTE ESTERASE UR-ACNC: NEGATIVE — SIGNIFICANT CHANGE UP
MAGNESIUM SERPL-MCNC: 2.1 MG/DL — SIGNIFICANT CHANGE UP (ref 1.6–2.6)
MCHC RBC-ENTMCNC: 32 PG — SIGNIFICANT CHANGE UP (ref 27–34)
MCHC RBC-ENTMCNC: 35 GM/DL — SIGNIFICANT CHANGE UP (ref 32–36)
MCV RBC AUTO: 91.3 FL — SIGNIFICANT CHANGE UP (ref 80–100)
NITRITE UR-MCNC: NEGATIVE — SIGNIFICANT CHANGE UP
NRBC # BLD: 0 /100 WBCS — SIGNIFICANT CHANGE UP (ref 0–0)
PH UR: 6 — SIGNIFICANT CHANGE UP (ref 5–8)
PHOSPHATE SERPL-MCNC: 3.1 MG/DL — SIGNIFICANT CHANGE UP (ref 2.5–4.5)
PLATELET # BLD AUTO: 232 K/UL — SIGNIFICANT CHANGE UP (ref 150–400)
POTASSIUM SERPL-MCNC: 3.3 MMOL/L — LOW (ref 3.5–5.3)
POTASSIUM SERPL-SCNC: 3.3 MMOL/L — LOW (ref 3.5–5.3)
PROT UR-MCNC: SIGNIFICANT CHANGE UP
RBC # BLD: 4.13 M/UL — SIGNIFICANT CHANGE UP (ref 3.8–5.2)
RBC # FLD: 11.5 % — SIGNIFICANT CHANGE UP (ref 10.3–14.5)
SODIUM SERPL-SCNC: 135 MMOL/L — SIGNIFICANT CHANGE UP (ref 135–145)
SP GR SPEC: 1.02 — SIGNIFICANT CHANGE UP (ref 1.01–1.02)
UROBILINOGEN FLD QL: NEGATIVE — SIGNIFICANT CHANGE UP
WBC # BLD: 8.55 K/UL — SIGNIFICANT CHANGE UP (ref 3.8–10.5)
WBC # FLD AUTO: 8.55 K/UL — SIGNIFICANT CHANGE UP (ref 3.8–10.5)

## 2022-11-23 PROCEDURE — 71045 X-RAY EXAM CHEST 1 VIEW: CPT | Mod: 26

## 2022-11-23 PROCEDURE — 99291 CRITICAL CARE FIRST HOUR: CPT

## 2022-11-23 PROCEDURE — 70450 CT HEAD/BRAIN W/O DYE: CPT | Mod: 26

## 2022-11-23 RX ORDER — LISINOPRIL 2.5 MG/1
30 TABLET ORAL
Refills: 0 | Status: DISCONTINUED | OUTPATIENT
Start: 2022-11-23 | End: 2022-11-26

## 2022-11-23 RX ORDER — HYDRALAZINE HCL 50 MG
5 TABLET ORAL ONCE
Refills: 0 | Status: COMPLETED | OUTPATIENT
Start: 2022-11-23 | End: 2022-11-23

## 2022-11-23 RX ORDER — POTASSIUM CHLORIDE 20 MEQ
40 PACKET (EA) ORAL EVERY 4 HOURS
Refills: 0 | Status: COMPLETED | OUTPATIENT
Start: 2022-11-23 | End: 2022-11-24

## 2022-11-23 RX ORDER — OXYCODONE HYDROCHLORIDE 5 MG/1
5 TABLET ORAL ONCE
Refills: 0 | Status: DISCONTINUED | OUTPATIENT
Start: 2022-11-23 | End: 2022-11-23

## 2022-11-23 RX ORDER — ACETAMINOPHEN 500 MG
1000 TABLET ORAL ONCE
Refills: 0 | Status: COMPLETED | OUTPATIENT
Start: 2022-11-23 | End: 2022-11-23

## 2022-11-23 RX ADMIN — CHLORHEXIDINE GLUCONATE 1 APPLICATION(S): 213 SOLUTION TOPICAL at 21:03

## 2022-11-23 RX ADMIN — Medication 1000 MILLIGRAM(S): at 19:30

## 2022-11-23 RX ADMIN — Medication 5 MILLIGRAM(S): at 22:03

## 2022-11-23 RX ADMIN — Medication 100 MILLIGRAM(S): at 12:59

## 2022-11-23 RX ADMIN — Medication 1000 MILLIGRAM(S): at 03:35

## 2022-11-23 RX ADMIN — Medication 400 MILLIGRAM(S): at 19:00

## 2022-11-23 RX ADMIN — OXYCODONE HYDROCHLORIDE 5 MILLIGRAM(S): 5 TABLET ORAL at 16:00

## 2022-11-23 RX ADMIN — OXYCODONE HYDROCHLORIDE 5 MILLIGRAM(S): 5 TABLET ORAL at 20:15

## 2022-11-23 RX ADMIN — Medication 1 MILLIGRAM(S): at 12:59

## 2022-11-23 RX ADMIN — Medication 1000 MILLIGRAM(S): at 10:50

## 2022-11-23 RX ADMIN — Medication 400 MILLIGRAM(S): at 10:19

## 2022-11-23 RX ADMIN — Medication 1 TABLET(S): at 13:00

## 2022-11-23 RX ADMIN — Medication 5 MILLIGRAM(S): at 02:36

## 2022-11-23 RX ADMIN — OXYCODONE HYDROCHLORIDE 5 MILLIGRAM(S): 5 TABLET ORAL at 20:45

## 2022-11-23 RX ADMIN — Medication 400 MILLIGRAM(S): at 03:20

## 2022-11-23 RX ADMIN — QUETIAPINE FUMARATE 12.5 MILLIGRAM(S): 200 TABLET, FILM COATED ORAL at 13:53

## 2022-11-23 RX ADMIN — Medication 10 MILLIGRAM(S): at 10:20

## 2022-11-23 RX ADMIN — OXYCODONE HYDROCHLORIDE 5 MILLIGRAM(S): 5 TABLET ORAL at 15:30

## 2022-11-23 RX ADMIN — LISINOPRIL 30 MILLIGRAM(S): 2.5 TABLET ORAL at 12:59

## 2022-11-23 NOTE — PROGRESS NOTE ADULT - SUBJECTIVE AND OBJECTIVE BOX
NSICU Progress Note:      24 HOUR EVENTS:   evd clamped          captopril 25 milliGRAM(s) Oral every 8 hours  chlorhexidine 4% Liquid 1 Application(s) Topical <User Schedule>  enoxaparin Injectable 40 milliGRAM(s) SubCutaneous <User Schedule>  folic acid 1 milliGRAM(s) Oral daily  LORazepam   Injectable 1 milliGRAM(s) IV Push every 2 hours PRN  melatonin 5 milliGRAM(s) Oral at bedtime  multivitamin/minerals/iron Oral Solution (CENTRUM) 15 milliLiter(s) Oral daily  niCARdipine Infusion 5 mG/Hr IV Continuous <Continuous>  polyethylene glycol 3350 17 Gram(s) Oral two times a day  senna 2 Tablet(s) Oral at bedtime  sodium chloride 0.9%. 1000 milliLiter(s) IV Continuous <Continuous>  thiamine IVPB 500 milliGRAM(s) IV Intermittent every 8 hours          Labs/Meds Reviewed    PHYSICAL EXAM:    General: calm  CVS: RRR  Pulm: CTAB  GI: Soft, NTND  Extremities: No LE Edema  Neuro: awake, drowsy, dysarthric oriented x1 and says she is in [place], (fluctuates) PERRL, Non-cooperative, EOMI, no facial,  ALARCON spont/AG                 NSICU Progress Note:      24 HOUR EVENTS:   evd clamped          captopril 25 milliGRAM(s) Oral every 8 hours  chlorhexidine 4% Liquid 1 Application(s) Topical <User Schedule>  enoxaparin Injectable 40 milliGRAM(s) SubCutaneous <User Schedule>  folic acid 1 milliGRAM(s) Oral daily  LORazepam   Injectable 1 milliGRAM(s) IV Push every 2 hours PRN  melatonin 5 milliGRAM(s) Oral at bedtime  multivitamin/minerals/iron Oral Solution (CENTRUM) 15 milliLiter(s) Oral daily  niCARdipine Infusion 5 mG/Hr IV Continuous <Continuous>  polyethylene glycol 3350 17 Gram(s) Oral two times a day  senna 2 Tablet(s) Oral at bedtime  sodium chloride 0.9%. 1000 milliLiter(s) IV Continuous <Continuous>  thiamine IVPB 500 milliGRAM(s) IV Intermittent every 8 hours          Labs/Meds Reviewed    PHYSICAL EXAM:    General: calm  CVS: RRR  Pulm: CTAB  GI: Soft, NTND  Extremities: No LE Edema  Neuro: awake, drowsy, dysarthric oriented x1 and [place], knows his son's name, PERRL, EOMI, no facial,  ALARCON spont/AG                 NSICU Progress Note:    24 HOUR EVENTS:   ICP elevated overnight likely related to agitation, hypertensive  EVD remains clamped     captopril 25 milliGRAM(s) Oral every 8 hours  chlorhexidine 4% Liquid 1 Application(s) Topical <User Schedule>  enoxaparin Injectable 40 milliGRAM(s) SubCutaneous <User Schedule>  folic acid 1 milliGRAM(s) Oral daily  LORazepam   Injectable 1 milliGRAM(s) IV Push every 2 hours PRN  melatonin 5 milliGRAM(s) Oral at bedtime  multivitamin/minerals/iron Oral Solution (CENTRUM) 15 milliLiter(s) Oral daily  niCARdipine Infusion 5 mG/Hr IV Continuous <Continuous>  polyethylene glycol 3350 17 Gram(s) Oral two times a day  senna 2 Tablet(s) Oral at bedtime  sodium chloride 0.9%. 1000 milliLiter(s) IV Continuous <Continuous>  thiamine IVPB 500 milliGRAM(s) IV Intermittent every 8 hours    Vitals/labs/meds Reviewed    PHYSICAL EXAM:    General: calm  CVS: RRR  Pulm: CTAB  GI: Soft, NTND  Extremities: No LE Edema  Neuro: awake, drowsy, dysarthric oriented x1 and says [place], knows his son's name, PERRL, EOMI, no facial,  ALARCON spont/AG

## 2022-11-23 NOTE — CONSULT NOTE ADULT - ASSESSMENT
67F with history of hypertension, dyslipidemia, new onset dementia, alcohol abuse (1/2-1 bottle of wine a day) presents after an unwitnessed fall with trauma to the front of the head on 11/17. The next day the patient was found to be confused. 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 No AC/AP. Patient s/p EVD placement 11/18 (removed 11/22). CTA without evidence of vascular malformation.    Impression: L cerebellar hemorrhage w/ IVH s/p EVD, possibly 2/2 HTN, less likely multiple cavernomas vs. CAA    Recommendations:  [] neurochecks and VS per primary team  [x] MRI brain w/w/o contrast   [x] CTH and CTA  [] BP Goal: <160/90   [x] on lovenox for DVT ppx  [] Avoid hyponatremia  [] PT/OT  [x] HA1c 5.6,     Case seen and discussed with attending Dr. Lomeli

## 2022-11-23 NOTE — PROGRESS NOTE ADULT - SUBJECTIVE AND OBJECTIVE BOX
NSICU Progress Note:    24 HOUR EVENTS:   ICP transiently elevated overnight likely related to agitation, hypertensive  EVD removed today     captopril 25 milliGRAM(s) Oral every 8 hours  chlorhexidine 4% Liquid 1 Application(s) Topical <User Schedule>  enoxaparin Injectable 40 milliGRAM(s) SubCutaneous <User Schedule>  folic acid 1 milliGRAM(s) Oral daily  LORazepam   Injectable 1 milliGRAM(s) IV Push every 2 hours PRN  melatonin 5 milliGRAM(s) Oral at bedtime  multivitamin/minerals/iron Oral Solution (CENTRUM) 15 milliLiter(s) Oral daily  niCARdipine Infusion 5 mG/Hr IV Continuous <Continuous>  polyethylene glycol 3350 17 Gram(s) Oral two times a day  senna 2 Tablet(s) Oral at bedtime  sodium chloride 0.9%. 1000 milliLiter(s) IV Continuous <Continuous>  thiamine IVPB 500 milliGRAM(s) IV Intermittent every 8 hours    Vitals/labs/meds Reviewed    PHYSICAL EXAM:  General: calm  CVS: RRR  Pulm: CTAB  GI: Soft, NTND  Extremities: No LE Edema  Neuro: awake, drowsy, dysarthric oriented x1 and says [place], knows his son's name, PERRL, EOMI, no facial,  ALARCON spont/AG                 NSICU Progress Note:    24 HOUR EVENTS:   ICP transiently elevated overnight likely related to agitation, hypertensive  EVD removed today     captopril 25 milliGRAM(s) Oral every 8 hours  chlorhexidine 4% Liquid 1 Application(s) Topical <User Schedule>  enoxaparin Injectable 40 milliGRAM(s) SubCutaneous <User Schedule>  folic acid 1 milliGRAM(s) Oral daily  LORazepam   Injectable 1 milliGRAM(s) IV Push every 2 hours PRN  melatonin 5 milliGRAM(s) Oral at bedtime  multivitamin/minerals/iron Oral Solution (CENTRUM) 15 milliLiter(s) Oral daily  niCARdipine Infusion 5 mG/Hr IV Continuous <Continuous>  polyethylene glycol 3350 17 Gram(s) Oral two times a day  senna 2 Tablet(s) Oral at bedtime  sodium chloride 0.9%. 1000 milliLiter(s) IV Continuous <Continuous>  thiamine IVPB 500 milliGRAM(s) IV Intermittent every 8 hours    Vitals/labs/meds Reviewed    PHYSICAL EXAM:  General: calm  CVS: RRR  Pulm: CTAB  GI: Soft, NTND  Extremities: No LE Edema  Neuro:  drowsy but arousable, dysarthric oriented x1 to name, PERRL, EOMI, no facial, B/L UE 5/5 strength, B/L LE spontaneously and antigravity

## 2022-11-23 NOTE — PROGRESS NOTE ADULT - PROVIDER SPECIALTY LIST ADULT
TELEMEDICINE VISIT on 2/1/2021 .    Verbal consent was given by the patient to conduct this visit.    History: The patient was scheduled for an appointment.  Due to hospital outpatient closure with the COVID-19 situation, a telemedicine visit was conducted as a follow-up instead.      Review of systems:  Gen: NAD. No fever.  Head: No headaches, vertigo.  Eyes: Normal vision, no diplopia, no pain.  Ears: No change in hearing, tinnitus, bleeding.  Nose: No epistaxis, coryza, obstruction.  Mouth: No dental issues, no gingival bleeding.  Chest: No cough, no heavyness, no hemoptysis.  Heart: No chest pain, palpitations, syncope, or orthopnea.  Abdomen: No dysphagia, or melena.  : No hematuria.  Musculoskeletal: No cyanosis x4 extr.  Neur: No changes in mentation or ataxia.  Psych: No changes in thought content.    Impression:   1. Lumbar degenerative disk disease with left-sided radiculopathy, with  severe central canal stenosis at L2/3.   2. History of cervical radiculopathy with recent injections.  3. Obesity.  4. Tobacco, alcohol, and marijuana use.     Plan:   1.  This visit was conducted due to the current COVID-19 situation.  I answered all of the patient's questions and they have our contact information.  2.  Had bilat RFA @ L2/3, L3/4, L4/5, L5/S1, and progressing well w/o side effects.  3.  Started PT. Has another 2 wks left. States, \"I'm definitely getting better.\" F/u after PT complete.  4.  Continues to have some arm numbness, but improving. See initial note for the remainder of the procedures and plan.    Over half of today's telemedicine visit was spent educating them about their disease.       NSICU

## 2022-11-23 NOTE — PROGRESS NOTE ADULT - ASSESSMENT
HPI:67F with cerebellar IPH with IVH and casting of 4th/3rd vent, ventriculomegaly, CTA without evidence of vascular malformationPoss HTN bleed    NEURO:  - Neuorchecks Q1H   - EVD clamped at 11 am 11/22, repeat ct today stable. EVD Removed  - follow up CTH in 48H post EVD pull  - MRI brain done   - EEG no seizures   dcd eeg  - Stroke work up - stroke neurology following   - on thiamine & folate     PULM:  - RA    CV:  - HTN  SBP goal 100-160 mmHg, on lisinopril incr to 30mg  -  TTE w ef 80%    RENAL:  - Fluids: IVL  - Na goal 135-145    GI: calorie count  - Diet: easy to chew diet ADAT  - GI prophylaxis [x] not indicated [] PPI [] other:  - Bowel regimen [x] Miralax [x] senna add dulcolax, if no BM add enema tonight  - LBM prior to admission but has been inconsistently      ENDO:   - Goal euglycemia (-180)  - A1c 5.6    HEME/ONC:  - H/H stable  - VTE prophylaxis: SCDs  - lovenox 40 mg sc qhs will check w nsgy if hold it tonight for poss evd pull    ID:  febrile 34.8- follow up pancx   no leukocytosis     ICU  full code  at risk for deterioration due to cerebellar hemorrhage, brainstem compression, hydrocephalus  stroke neurology consult  HPI:67F with cerebellar IPH with IVH and casting of 4th/3rd vent, ventriculomegaly, CTA without evidence of vascular malformationPoss HTN bleed    NEURO:  - Neurochecks Q2H   - EVD clamped 11/22, repeat ct this AM stable and EVD removed today 11/23   - will follow up CTH in 48H post EVD pull  - MRI brain done   - EEG no seizures   dcd eeg  - on thiamine & folate   - seroquel prn for agitation  - Stroke work up - stroke neurology following     PULM:  - RA    CV:  - HTN  SBP goal 100-160 mmHg, on lisinopril incr to 30mg  -  TTE w ef 80%    RENAL:  - Fluids: IVL  - Na goal 135-145    GI: calorie count  - Diet: easy to chew diet ADAT  - GI prophylaxis [x] not indicated [] PPI [] other:  - Bowel regimen [x] Miralax [x] senna   - LBM today 11/23     ENDO:   - Goal euglycemia (-180)  - A1c 5.6    HEME/ONC:  - H/H stable  - VTE prophylaxis: SCDs  - lovenox 40 mg sc qhs- held for EVD removal     ID:  febrile today 34.8- follow up pancx   monitor temp curve   no leukocytosis     ICU  full code  at risk for deterioration due to cerebellar hemorrhage, brainstem compression, hydrocephalus  stroke neurology consult

## 2022-11-23 NOTE — CHART NOTE - NSCHARTNOTEFT_GEN_A_CORE
Nutrition Follow Up Note  Patient seen for: Calorie count initiation    Chart reviewed, events noted.    Per chart, "this is a 67-year-old female with a PMHx of HTN, HLD, early dementia, and EtOH use disorder, presented to [facility] with dizziness and AMS x 1 day. 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 Transferred to [facility] and received urgent EVD in ED due to AMS." Per interdisciplinary huddle, EVD output was downtrending, was clamped yesterday 11/22, ICP increased but was resolved within 30 minutes, awaiting CTH today 11/23 and if stable, EVD will be removed. Per EEG report 11/22, abnormal EEG study, pt with "moderate generalized background slowing," EEG discontinued today.     Source: [x] Patient       [x] EMR        [x] RN        [x] Family at bedside - son, Ocatvio       [x] Other: interdisciplinary huddle    -If unable to interview patient: [] Trach/Vent/BiPAP  [x] Disoriented/confused/inappropriate to interview    Diet Order:   Diet, Regular:   Easy to Chew (EASYTOCHEW)  Supplement Feeding Modality:  Oral  Ensure Enlive Cans or Servings Per Day:  1       Frequency:  Three Times a day (11-22-22)    - Is current order appropriate/adequate? [x] Yes  []  No:     - PO intake :   [] >75%  Adequate    [] 50-75%  Fair       [x] <50%  Poor    - Nutrition-related concerns:  - Pt initiated po diet 11/22, calorie count placed to determine adequacy of po intake   - Son was feeding pt breakfast just prior to visit, noted she ate ~50% of the tray, ate just under 50% of dinner, reported pt typically eats small portions. Ensure Plus High Protein on breakfast tray, pt had yet to try one during this admission, though reported having liked them in the past. Son to try to have pt drink one between meals.  - Son denied any recent N/V or GI distress, unable to report on BMs.  - Continues on multivitamin, thiamine, and folic acid in setting of EtOH withdrawal  - Noted with hypophosphatemia, team to trend labs and replete prn    GI:  Last BM none noted - PTA per chart, RN confirmed.   Bowel Regimen? [x] Yes - miralax and senna      Weights:   Dosing wt 52.6 kg (11/18 - stated)    Nutritionally Pertinent MEDICATIONS  (STANDING):  folic acid  multivitamin/minerals  miralax  senna  sodium chloride 0.9%.  thiamine    Pertinent Labs:   A1C with Estimated Average Glucose Result: 5.6 % (11-19-22 @ 04:26)  Serum phosphorus 2.4 <L>  Creatinine 0.42 <L>    Skin per nursing documentation: Per flowsheet, no pressure injuries noted, R head surgical incision, EVD in place  Edema: Per flowsheet, none noted     Estimated Needs: based on dosing wt 52.6 kg (11/18)  [x] no change since previous assessment  -Energy: 25-30 kcal/kg (9975-3872 kcal/day)  -Protein: 1.2-1.4 g/kg (63-73 g/day)      Previous Nutrition Diagnosis: Increased nutrient needs  Nutrition Diagnosis is: [x] ongoing - being addressed by po intake and protein supplements (Ensure Plus High Protein)    New Nutrition Diagnosis: [x] Not applicable    Nutrition Care Plan:  [x] In Progress  [] Achieved  [] Not applicable    Nutrition Interventions:     Education Provided:       [x] Yes: Spoke with pt's son, Octavio, at bedside regarding pt's increased protein needs, the importance of protein in maintaining muscle mass and healing, focusing on protein first at meals, sources of protein in the diet, and using Ensure shakes in between meals rather than as meal replacements.     Recommendations:      1) Continue with regular diet, texture/consistency deferred to team/SLP, encourage protein-rich foods, obtain and honor preferences as able  2) Continue with multivitamin, thiamine, and folic acid  3) Monitor BMs, if no BMs persists consider increasing bowel regimen  4) Monitor PO intake, diet tolerance, labs, hydration, GI function, skin integrity and wt trends        Monitoring and Evaluation:   Continue to monitor nutritional intake, tolerance to diet prescription, weights, labs, skin integrity      RD remains available upon request and will follow up per protocol    Bessy Gray, Dietetic Intern - pager #[phone] Nutrition Follow Up Note  Patient seen for: Calorie count initiation    Chart reviewed, events noted.    Per chart, "this is a 67-year-old female with a PMHx of HTN, HLD, early dementia, and EtOH use disorder, presented to [facility] with dizziness and AMS x 1 day. 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 Transferred to [facility] and received urgent EVD in ED due to AMS." Per interdisciplinary huddle, EVD output was downtrending, was clamped yesterday 11/22, ICP increased but was resolved within 30 minutes, awaiting CTH today 11/23 and if stable, EVD will be removed. Per EEG report 11/22, abnormal EEG study, pt with "moderate generalized background slowing," EEG discontinued today.     Source: [x] Patient       [x] EMR        [x] RN        [x] Family at bedside - son, Octavio       [x] Other: interdisciplinary huddle    -If unable to interview patient: [] Trach/Vent/BiPAP  [x] Disoriented/confused/inappropriate to interview    Diet Order:   Diet, Regular:   Easy to Chew (EASYTOCHEW)  Supplement Feeding Modality:  Oral  Ensure Enlive Cans or Servings Per Day:  1       Frequency:  Three Times a day (11-22-22)    - Is current order appropriate/adequate? [x] Yes  []  No:     - PO intake :   [] >75%  Adequate    [] 50-75%  Fair       [x] <50%  Poor    - Nutrition-related concerns:  - PO diet initiated 11/19; made NPO intermittently, now continues on Easy to Chew diet with 3 Ensure Enlive daily, calorie count placed to determine adequacy of po intake, RN made aware  - Son was feeding pt breakfast just prior to visit, noted she ate ~50% of the tray, ate just under 50% of dinner, reported pt typically eats small portions. Ensure Plus High Protein on breakfast tray, pt had yet to try one during this admission, though reported having liked them in the past. Son to try to have pt drink one between meals.  - Son denied any recent N/V or GI distress, unable to report on BMs.  - Continues on multivitamin, thiamine, and folic acid in setting of EtOH withdrawal  - Noted with hypophosphatemia, team to trend labs and replete prn    GI:  Last BM none noted - PTA per chart, RN confirmed.   Bowel Regimen? [x] Yes - miralax and senna      Weights:   Dosing wt 52.6 kg (11/18 - stated)    Nutritionally Pertinent MEDICATIONS  (STANDING):  folic acid  multivitamin/minerals  miralax  senna  sodium chloride 0.9%.  thiamine    Pertinent Labs:   A1C with Estimated Average Glucose Result: 5.6 % (11-19-22 @ 04:26)  Serum phosphorus 2.4 <L>  Creatinine 0.42 <L>    Skin per nursing documentation: Per flowsheet, no pressure injuries noted, R head surgical incision, EVD in place  Edema: Per flowsheet, none noted     Estimated Needs: based on dosing wt 52.6 kg (11/18)  [x] no change since previous assessment  -Energy: 25-30 kcal/kg (4550-2814 kcal/day)  -Protein: 1.2-1.4 g/kg (63-73 g/day)      Previous Nutrition Diagnosis: Increased nutrient needs  Nutrition Diagnosis is: [x] ongoing - being addressed by po intake and protein supplements (Ensure Plus High Protein)    New Nutrition Diagnosis: Inadequate protein energy intake related to poor appetite/inability to complete meals as evidenced by pt consuming </50% of meals    Nutrition Care Plan:  [x] In Progress  [] Achieved  [] Not applicable    Nutrition Interventions:     Education Provided:       [x] Yes: Spoke with pt's son, Octavio, at bedside regarding pt's increased protein needs, the importance of protein in maintaining muscle mass and healing, focusing on protein first at meals, sources of protein in the diet, and using Ensure shakes in between meals rather than as meal replacements.     Recommendations:      1) Continue po diet as prescribed with no therapeutic restrictions, texture/consistency deferred to team/SLP, encourage protein-rich foods, obtain and honor preferences as able. Continue Ensure Enlive 3x/day.  2) Continue with multivitamin, thiamine, and folic acid  3) Monitor BMs, if no BMs persists consider increasing bowel regimen  4) Monitor PO intake, diet tolerance, labs, hydration, GI function, skin integrity and wt trends   5) RD to assess and interpret calorie count upon completion of 3 days    Monitoring and Evaluation:   Continue to monitor nutritional intake, tolerance to diet prescription, weights, labs, skin integrity      RD remains available upon request and will follow up per protocol    Bessy Gray, Dietetic Intern - pager #[phone]

## 2022-11-23 NOTE — CHART NOTE - NSCHARTNOTEFT_GEN_A_CORE
EVDrain cleared for removal per neurosurgery. Drain raised and opened and removed without complication.  Toxey applied for hemostasis.  Patient tolerated procedure well.

## 2022-11-23 NOTE — CONSULT NOTE ADULT - ATTENDING COMMENTS
Ms. Yadav is a 67-year-old woman with vascular risk factors of hypertension hyperlipidemia, alcohol abuse, seen in neurological ICU for left cerebellar intracerebral hemorrhage with intraventricular component and third and fourth ventricular space.  On neurological exam today she is agitated, uses curse words on attempting to exam, still able to move her arms and legs against gravity, moving left arm and leg less as compared to right.  Impression: Left cerebellar hemorrhage, intraventricular hemorrhage, hydrocephalus  S/p EVD  Etiology includes uncontrolled hypertension versus vascular lesion  Will need MRI brain with and without contrast  SCDs for DVT prophylaxis  Neurochecks every hour  Repeat neuroimaging in 24 hours

## 2022-11-23 NOTE — PROGRESS NOTE ADULT - ASSESSMENT
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, ventriculomegaly, CTA without evidence of vascular malformation  Poss HTN bleed    NEURO:  - Neuorchecks Q1H   - EVD clamped at 11 am 11/22, repeat ct today and if stable dc evd  - MRi brain done   - EEG no seizures   dcd eeg  - Stroke work up - stroke neurology on board  - on thiamine     PULM:  - RA      CV:  - HTN  SBP goal 100-160 mmHg, on  lisinopril  -  TTE w ef 80%  - joy      RENAL:  - Fluids: ns 50 while NPO  - Na goal 135-145    GI: calorie count  - Diet: easy to chew diet  - GI prophylaxis [x] not indicated [] PPI [] other:  - Bowel regimen [x] Miralax [x] senna [] other:  - LBM prior to admission but has been NPO     ENDO:   - Goal euglycemia (-180)  - A1c 5.6    HEME/ONC:  - H/H stable  - VTE prophylaxis: SCDs  - lovenox 40 mg sc qhs will check w nsgy if hold it tonight for poss evd pull      ID:  afebrile    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, ventriculomegaly, CTA without evidence of vascular malformation  Poss HTN bleed    NEURO:  - Neuorchecks Q1H   - EVD clamped at 11 am 11/22, repeat ct today and if stable dc evd  - MRi brain done   - EEG no seizures   dcd eeg  - Stroke work up - stroke neurology on board  - on thiamine     PULM:  - RA      CV:  - HTN  SBP goal 100-160 mmHg, on  lisinopril incr to 30mg  -  TTE w ef 80%  - DC joy      RENAL:  - Fluids: IVL  - Na goal 135-145    GI: calorie count  - Diet: easy to chew diet ADAT  - GI prophylaxis [x] not indicated [] PPI [] other:  - Bowel regimen [x] Miralax [x] senna add dulcolax, if no BM add enema tonight  - LBM prior to admission but has been inconsistently      ENDO:   - Goal euglycemia (-180)  - A1c 5.6    HEME/ONC:  - H/H stable  - VTE prophylaxis: SCDs  - lovenox 40 mg sc qhs will check w nsgy if hold it tonight for poss evd pull      ID:  afebrile    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, ventriculomegaly, CTA without evidence of vascular malformation  Poss HTN bleed    NEURO:  - Neuorchecks Q1H   - EVD clamped at 11 am 11/22, repeat ct today and if stable dc evd  - MRi brain done   - EEG no seizures   dcd eeg  - Stroke work up - stroke neurology on board  - on thiamine     PULM:  - RA    CV:  - HTN  SBP goal 100-160 mmHg, on  lisinopril incr to 30mg  -  TTE w ef 80%  - DC joy      RENAL:  - Fluids: IVL  - Na goal 135-145    GI: calorie count  - Diet: easy to chew diet ADAT  - GI prophylaxis [x] not indicated [] PPI [] other:  - Bowel regimen [x] Miralax [x] senna add dulcolax, if no BM add enema tonight  - LBM prior to admission but has been inconsistently      ENDO:   - Goal euglycemia (-180)  - A1c 5.6    HEME/ONC:  - H/H stable  - VTE prophylaxis: SCDs  - lovenox 40 mg sc qhs will check w nsgy if hold it tonight for poss evd pull      ID:  afebrile     ICU  full code  at risk for deterioration due to cerebellar hemorrhage, brainstem compression, hydrocephalus  stroke neurology consult

## 2022-11-23 NOTE — PROGRESS NOTE ADULT - SUBJECTIVE AND OBJECTIVE BOX
Patient seen and examined at bedside.    --Anticoagulation--  enoxaparin Injectable 40 milliGRAM(s) SubCutaneous <User Schedule>    T(C): 36.9 (11-23-22 @ 03:00), Max: 37.5 (11-22-22 @ 07:00)  HR: 77 (11-23-22 @ 03:00) (50 - 82)  BP: 182/83 (11-23-22 @ 03:00) (120/66 - 207/88)  RR: 17 (11-23-22 @ 03:00) (12 - 23)  SpO2: 99% (11-23-22 @ 03:00) (96% - 100%)  Wt(kg): --    Exam: lethargic, agitated/uncooperative at times, Ox1, PERRL, FC, ALARCON 5/5 no drift    .  LABS:                         13.1   8.83  )-----------( 191      ( 21 Nov 2022 22:41 )             36.3     11-21    135  |  99  |  8   ----------------------------<  99  3.5   |  23  |  0.42<L>    Ca    8.7      21 Nov 2022 22:41  Phos  2.4     11-21  Mg     2.0     11-21                RADIOLOGY, EKG & ADDITIONAL TESTS: Reviewed.

## 2022-11-24 LAB
ANION GAP SERPL CALC-SCNC: 9 MMOL/L — SIGNIFICANT CHANGE UP (ref 5–17)
BUN SERPL-MCNC: 8 MG/DL — SIGNIFICANT CHANGE UP (ref 7–23)
CALCIUM SERPL-MCNC: 9.5 MG/DL — SIGNIFICANT CHANGE UP (ref 8.4–10.5)
CHLORIDE SERPL-SCNC: 100 MMOL/L — SIGNIFICANT CHANGE UP (ref 96–108)
CO2 SERPL-SCNC: 27 MMOL/L — SIGNIFICANT CHANGE UP (ref 22–31)
CREAT SERPL-MCNC: 0.5 MG/DL — SIGNIFICANT CHANGE UP (ref 0.5–1.3)
EGFR: 104 ML/MIN/1.73M2 — SIGNIFICANT CHANGE UP
GLUCOSE SERPL-MCNC: 166 MG/DL — HIGH (ref 70–99)
MAGNESIUM SERPL-MCNC: 2.2 MG/DL — SIGNIFICANT CHANGE UP (ref 1.6–2.6)
PHOSPHATE SERPL-MCNC: 1.8 MG/DL — LOW (ref 2.5–4.5)
POTASSIUM SERPL-MCNC: 4.1 MMOL/L — SIGNIFICANT CHANGE UP (ref 3.5–5.3)
POTASSIUM SERPL-SCNC: 4.1 MMOL/L — SIGNIFICANT CHANGE UP (ref 3.5–5.3)
SODIUM SERPL-SCNC: 136 MMOL/L — SIGNIFICANT CHANGE UP (ref 135–145)

## 2022-11-24 PROCEDURE — 93970 EXTREMITY STUDY: CPT | Mod: 26

## 2022-11-24 PROCEDURE — 93010 ELECTROCARDIOGRAM REPORT: CPT

## 2022-11-24 PROCEDURE — 99232 SBSQ HOSP IP/OBS MODERATE 35: CPT

## 2022-11-24 PROCEDURE — 99233 SBSQ HOSP IP/OBS HIGH 50: CPT

## 2022-11-24 PROCEDURE — 70450 CT HEAD/BRAIN W/O DYE: CPT | Mod: 26

## 2022-11-24 RX ORDER — HYDRALAZINE HCL 50 MG
5 TABLET ORAL ONCE
Refills: 0 | Status: COMPLETED | OUTPATIENT
Start: 2022-11-24 | End: 2022-11-24

## 2022-11-24 RX ORDER — SODIUM,POTASSIUM PHOSPHATES 278-250MG
1 POWDER IN PACKET (EA) ORAL ONCE
Refills: 0 | Status: COMPLETED | OUTPATIENT
Start: 2022-11-24 | End: 2022-11-24

## 2022-11-24 RX ORDER — BNT162B2 ORIGINAL AND OMICRON BA.4/BA.5 .1125; .1125 MG/2.25ML; MG/2.25ML
0.3 INJECTION, SUSPENSION INTRAMUSCULAR ONCE
Refills: 0 | Status: COMPLETED | OUTPATIENT
Start: 2022-11-24 | End: 2022-11-29

## 2022-11-24 RX ORDER — ACETAMINOPHEN 500 MG
650 TABLET ORAL EVERY 6 HOURS
Refills: 0 | Status: DISCONTINUED | OUTPATIENT
Start: 2022-11-24 | End: 2022-11-29

## 2022-11-24 RX ORDER — QUETIAPINE FUMARATE 200 MG/1
12.5 TABLET, FILM COATED ORAL AT BEDTIME
Refills: 0 | Status: DISCONTINUED | OUTPATIENT
Start: 2022-11-24 | End: 2022-11-29

## 2022-11-24 RX ORDER — HALOPERIDOL DECANOATE 100 MG/ML
1 INJECTION INTRAMUSCULAR ONCE
Refills: 0 | Status: COMPLETED | OUTPATIENT
Start: 2022-11-24 | End: 2022-11-24

## 2022-11-24 RX ADMIN — Medication 40 MILLIEQUIVALENT(S): at 05:38

## 2022-11-24 RX ADMIN — LISINOPRIL 30 MILLIGRAM(S): 2.5 TABLET ORAL at 13:47

## 2022-11-24 RX ADMIN — Medication 650 MILLIGRAM(S): at 02:13

## 2022-11-24 RX ADMIN — Medication 40 MILLIEQUIVALENT(S): at 01:35

## 2022-11-24 RX ADMIN — CHLORHEXIDINE GLUCONATE 1 APPLICATION(S): 213 SOLUTION TOPICAL at 21:01

## 2022-11-24 RX ADMIN — QUETIAPINE FUMARATE 12.5 MILLIGRAM(S): 200 TABLET, FILM COATED ORAL at 21:01

## 2022-11-24 RX ADMIN — Medication 650 MILLIGRAM(S): at 15:30

## 2022-11-24 RX ADMIN — Medication 1 PACKET(S): at 23:01

## 2022-11-24 RX ADMIN — SENNA PLUS 2 TABLET(S): 8.6 TABLET ORAL at 21:01

## 2022-11-24 RX ADMIN — Medication 650 MILLIGRAM(S): at 16:00

## 2022-11-24 RX ADMIN — Medication 100 MILLIGRAM(S): at 11:41

## 2022-11-24 RX ADMIN — Medication 1 TABLET(S): at 11:41

## 2022-11-24 RX ADMIN — HALOPERIDOL DECANOATE 1 MILLIGRAM(S): 100 INJECTION INTRAMUSCULAR at 16:04

## 2022-11-24 RX ADMIN — Medication 1 MILLIGRAM(S): at 11:41

## 2022-11-24 RX ADMIN — Medication 650 MILLIGRAM(S): at 02:43

## 2022-11-24 RX ADMIN — Medication 5 MILLIGRAM(S): at 05:27

## 2022-11-24 NOTE — PROGRESS NOTE ADULT - ASSESSMENT
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, ventriculomegaly, CTA without evidence of vascular malformation  Poss HTN bleed    NEURO:  - Neuorchecks Q2H   - EVD removed 11/23  - CTH repeated tomorrow  - MRi brain done   - EEG no seizures   dcd eeg  - Stroke work up - stroke neurology on board  - on thiamine   PT OT, SLP    PULM:  - RA    CV:  - HTN  SBP goal 100-160 mmHg, on  lisinopril 30mg  -  TTE w ef 80%  - DCd joy      RENAL:  - Fluids: IVL  - Na goal 135-145    GI:   - Diet: easy to chew diet ADAT  - GI prophylaxis [x] not indicated [] PPI [] other:  - Bowel regimen [x] Miralax [x] senna add dulcolax  - LBM 11/23    ENDO:   - Goal euglycemia (-180)  - A1c 5.6    HEME/ONC:  - H/H stable  - VTE prophylaxis: SCDs  - lovenox 40 mg sc qhs       ID:   febrile, ua neg, FU blood cultures    ICU, dispo- stroke unit  full code   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, ventriculomegaly, CTA without evidence of vascular malformation  Poss HTN bleed    NEURO:  - Neuorchecks Q2H   - EVD removed 11/23  - CTH repeated tomorrow  - MRi brain done   - EEG no seizures dcd eeg  - Stroke work up - stroke neurology on board  - on thiamine   PT OT, SLP    PULM:  - RA    CV:  - HTN  SBP goal 100-160 mmHg, on  lisinopril 30mg  -  TTE w ef 80%  - DCd joy      RENAL:  - Fluids: IVL  - Na goal 135-145    GI:   - Diet: easy to chew diet ADAT  - GI prophylaxis [x] not indicated [] PPI [] other:  - Bowel regimen [x] Miralax [x] senna added dulcolax  - LBM 11/23    ENDO:   - Goal euglycemia (-180)  - A1c 5.6    HEME/ONC:  - H/H stable  - VTE prophylaxis: SCDs  - lovenox 40 mg sc qhs       ID:   Afebrile today  Was febrile yesterday, follow up cultures    ICU, dispo- stroke unit  full code

## 2022-11-24 NOTE — PROGRESS NOTE ADULT - SUBJECTIVE AND OBJECTIVE BOX
NSICU Progress Note:    24 HOUR EVENTS:   ICP elevated overnight likely related to agitation, hypertensive  EVD removed 11/23    captopril 25 milliGRAM(s) Oral every 8 hours  chlorhexidine 4% Liquid 1 Application(s) Topical <User Schedule>  enoxaparin Injectable 40 milliGRAM(s) SubCutaneous <User Schedule>  folic acid 1 milliGRAM(s) Oral daily  LORazepam   Injectable 1 milliGRAM(s) IV Push every 2 hours PRN  melatonin 5 milliGRAM(s) Oral at bedtime  multivitamin/minerals/iron Oral Solution (CENTRUM) 15 milliLiter(s) Oral daily  niCARdipine Infusion 5 mG/Hr IV Continuous <Continuous>  polyethylene glycol 3350 17 Gram(s) Oral two times a day  senna 2 Tablet(s) Oral at bedtime  sodium chloride 0.9%. 1000 milliLiter(s) IV Continuous <Continuous>  thiamine IVPB 500 milliGRAM(s) IV Intermittent every 8 hours    Vitals/labs/meds Reviewed  ICU Vital Signs Last 24 Hrs:    T(C): 37.6 (24 Nov 2022 03:00), Max: 38.4 (23 Nov 2022 19:00)  T(F): 99.6 (24 Nov 2022 03:00), Max: 101.1 (23 Nov 2022 19:00)  HR: 61 (24 Nov 2022 05:00) (56 - 77)  BP: 169/72 (24 Nov 2022 05:00) (130/61 - 173/91)  BP(mean): 100 (24 Nov 2022 05:00) (79 - 119)  ABP: 117/93 (23 Nov 2022 10:00) (101/77 - 158/101)  ABP(mean): 106 (23 Nov 2022 10:00) (90 - 124)  RR: 20 (24 Nov 2022 05:00) (11 - 20)  SpO2: 97% (24 Nov 2022 05:00) (97% - 100%)      I&O's Summary    22 Nov 2022 07:01  -  23 Nov 2022 07:00  --------------------------------------------------------  IN: 1442.4 mL / OUT: 1408 mL / NET: 34.4 mL    23 Nov 2022 07:01  -  24 Nov 2022 06:04  --------------------------------------------------------  IN: 1155 mL / OUT: 1300 mL / NET: -145 mL        PHYSICAL EXAM:    General: calm  CVS: RRR  Pulm: CTAB  GI: Soft, NTND  Extremities: No LE Edema  Neuro: awake, drowsy, dysarthric oriented x1, FC, PERRL, EOMI, no facial,  ALARCON spont/AG

## 2022-11-24 NOTE — PROGRESS NOTE ADULT - ASSESSMENT
67F with history of hypertension, dyslipidemia, new onset dementia, alcohol abuse (1/2-1 bottle of wine a day) presents after an unwitnessed fall with trauma to the front of the head on 11/17. The next day the patient was found to be confused. 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 No AC/AP. Patient s/p EVD placement 11/18 (removed 11/23). CTA without evidence of vascular malformation.    Impression: Left cerebellar hemorrhage, intraventricular hemorrhage, hydrocephalus S/p EVD Etiology includes uncontrolled hypertension versus vascular lesion    NEURO: Neurologically improved since admission, Continue close monitoring for neurologic deterioration, SBP < 140,  Neurosurgery following for previous EVD. MRI Brain w/o noted above, plan for MRI HEAD with contrast in 4-6 weeks once hemorrhage is reabsorbed to rule out underling malformation.  EEG 11/22: Moderate diffuse/multi-focal cerebral dysfunction, not specific as to etiology.  There were no epileptiform abnormalities recorded x 1 day. Physical therapy/OT: AR    ANTITHROMBOTIC THERAPY: none indicated due to hemorrhage    PULMONARY: CXR clear 11/23, protecting airway, saturating well     CARDIOVASCULAR: check TTE, cardiac monitoring                              SBP goal: < 160    GASTROINTESTINAL:  dysphagia screen initially failed, now on diet       Diet: Regular    RENAL: BUN/Cr stable, good urine output      Na Goal: Greater than 135     Ann: N    HEMATOLOGY: H/H stable, Platelets 232     DVT ppx: venodynes, will started once CT head stable    ID: afebrile, no leukocytosis     OTHER: Plan of care discussed with patient and family at bedside. Accepted to stroke unit,    DISPOSITION: AR once stable and workup is complete      CORE MEASURES:        Admission NIHSS:      TPA: [] YES [x] NO      LDL/HDL: 163/64     Depression Screen: 0     Statin Therapy: N     Dysphagia Screen: [] PASS [x] FAIL     Smoking [] YES [x] NO      Afib [] YES [x] NO     Stroke Education [x] YES [] NO    Obtain screening lower extremity venous ultrasound in patients who meet 1 or more of the following criteria as patient is high risk for DVT/PE on admission:   [] History of DVT/PE  []Hypercoagulable states (Factor V Leiden, Cancer, OCP, etc. )  []Prolonged immobility (hemiplegia/hemiparesis/post operative or any other extended immobilization)  [] Transferred from outside facility (Rehab or Long term care)  [] Age </= to 50. 67F with history of hypertension, dyslipidemia, new onset dementia, alcohol abuse (1/2-1 bottle of wine a day) presents after an unwitnessed fall with trauma to the front of the head on 11/17. The next day the patient was found to be confused. 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 No AC/AP. Patient s/p EVD placement 11/18 (removed 11/23). CTA without evidence of vascular malformation.    Impression: Left cerebellar hemorrhage, intraventricular hemorrhage, hydrocephalus S/p EVD Etiology includes uncontrolled hypertension versus vascular lesion    NEURO: Neurologically improved since admission, Continue close monitoring for neurologic deterioration, SBP < 140,  Neurosurgery following for previous EVD. MRI Brain w/o noted above, plan for MRI HEAD with contrast in 4-6 weeks once hemorrhage is reabsorbed to rule out underling malformation.  EEG 11/22: Moderate diffuse/multi-focal cerebral dysfunction, not specific as to etiology.  There were no epileptiform abnormalities recorded x 1 day. Physical therapy/OT: AR    ANTITHROMBOTIC THERAPY: none indicated due to hemorrhage    PULMONARY: CXR clear 11/23, protecting airway, saturating well     CARDIOVASCULAR: TTE: EF >80%, Hyperdynamic left ventricular systolic function. , cardiac monitoring                              SBP goal: < 160    GASTROINTESTINAL:  dysphagia screen initially failed, now on diet       Diet: Regular    RENAL: BUN/Cr stable, good urine output      Na Goal: Greater than 135     Ann: N    HEMATOLOGY: H/H stable, Platelets 232     DVT ppx: venodynes, will started once CT head stable    ID: febrile on 11/23: so si/sx of infection, no leukocytosis, UA negative     OTHER: history of alcohol abuse ( 1/2-1 bottle of wine a day) was on CIWA, no signs of withdrawal  Plan of care discussed with patient and family at bedside. Accepted to stroke unit.    DISPOSITION: AR once stable and workup is complete      CORE MEASURES:        Admission NIHSS:      TPA: [] YES [x] NO      LDL/HDL: 163/64     Depression Screen: 0     Statin Therapy: N     Dysphagia Screen: [] PASS [x] FAIL     Smoking [] YES [x] NO      Afib [] YES [x] NO     Stroke Education [x] YES [] NO    Obtain screening lower extremity venous ultrasound in patients who meet 1 or more of the following criteria as patient is high risk for DVT/PE on admission:   [] History of DVT/PE  []Hypercoagulable states (Factor V Leiden, Cancer, OCP, etc. )  []Prolonged immobility (hemiplegia/hemiparesis/post operative or any other extended immobilization)  [] Transferred from outside facility (Rehab or Long term care)  [] Age </= to 50.

## 2022-11-24 NOTE — PROGRESS NOTE ADULT - SUBJECTIVE AND OBJECTIVE BOX
NSICU Progress Note:    24 HOUR EVENTS:   EVD removed 11/23  Patient more cooperative today. Accepted to stroke unit. CTH to be repeated tomorrow AM.    captopril 25 milliGRAM(s) Oral every 8 hours  chlorhexidine 4% Liquid 1 Application(s) Topical <User Schedule>  enoxaparin Injectable 40 milliGRAM(s) SubCutaneous <User Schedule>  folic acid 1 milliGRAM(s) Oral daily  LORazepam   Injectable 1 milliGRAM(s) IV Push every 2 hours PRN  melatonin 5 milliGRAM(s) Oral at bedtime  multivitamin/minerals/iron Oral Solution (CENTRUM) 15 milliLiter(s) Oral daily  niCARdipine Infusion 5 mG/Hr IV Continuous <Continuous>  polyethylene glycol 3350 17 Gram(s) Oral two times a day  senna 2 Tablet(s) Oral at bedtime  sodium chloride 0.9%. 1000 milliLiter(s) IV Continuous <Continuous>  thiamine IVPB 500 milliGRAM(s) IV Intermittent every 8 hours    Vitals/labs/meds Reviewed  ICU Vital Signs Last 24 Hrs:    T(C): 37.6 (24 Nov 2022 03:00), Max: 38.4 (23 Nov 2022 19:00)  T(F): 99.6 (24 Nov 2022 03:00), Max: 101.1 (23 Nov 2022 19:00)  HR: 61 (24 Nov 2022 05:00) (56 - 77)  BP: 169/72 (24 Nov 2022 05:00) (130/61 - 173/91)  BP(mean): 100 (24 Nov 2022 05:00) (79 - 119)  ABP: 117/93 (23 Nov 2022 10:00) (101/77 - 158/101)  ABP(mean): 106 (23 Nov 2022 10:00) (90 - 124)  RR: 20 (24 Nov 2022 05:00) (11 - 20)  SpO2: 97% (24 Nov 2022 05:00) (97% - 100%)      I&O's Summary    22 Nov 2022 07:01  -  23 Nov 2022 07:00  --------------------------------------------------------  IN: 1442.4 mL / OUT: 1408 mL / NET: 34.4 mL    23 Nov 2022 07:01  -  24 Nov 2022 06:04  --------------------------------------------------------  IN: 1155 mL / OUT: 1300 mL / NET: -145 mL        PHYSICAL EXAM:    General: calm  CVS: RRR  Pulm: CTAB  GI: Soft, NTND  Extremities: No LE Edema  Neuro: awake, drowsy, dysarthric oriented x1, FC, PERRL, EOMI, no facial,  ALARCON spont/AG                 NSICU Progress Note:    24 HOUR EVENTS:   EVD removed 11/23  Patient more cooperative today. Accepted to stroke unit. CTH to be repeated tomorrow AM.    captopril 25 milliGRAM(s) Oral every 8 hours  chlorhexidine 4% Liquid 1 Application(s) Topical <User Schedule>  enoxaparin Injectable 40 milliGRAM(s) SubCutaneous <User Schedule>  folic acid 1 milliGRAM(s) Oral daily  LORazepam   Injectable 1 milliGRAM(s) IV Push every 2 hours PRN  melatonin 5 milliGRAM(s) Oral at bedtime  multivitamin/minerals/iron Oral Solution (CENTRUM) 15 milliLiter(s) Oral daily  niCARdipine Infusion 5 mG/Hr IV Continuous <Continuous>  polyethylene glycol 3350 17 Gram(s) Oral two times a day  senna 2 Tablet(s) Oral at bedtime  sodium chloride 0.9%. 1000 milliLiter(s) IV Continuous <Continuous>  thiamine IVPB 500 milliGRAM(s) IV Intermittent every 8 hours    Vitals/labs/meds Reviewed  ICU Vital Signs Last 24 Hrs:    T(C): 37.6 (24 Nov 2022 03:00), Max: 38.4 (23 Nov 2022 19:00)  T(F): 99.6 (24 Nov 2022 03:00), Max: 101.1 (23 Nov 2022 19:00)  HR: 61 (24 Nov 2022 05:00) (56 - 77)  BP: 169/72 (24 Nov 2022 05:00) (130/61 - 173/91)  BP(mean): 100 (24 Nov 2022 05:00) (79 - 119)  ABP: 117/93 (23 Nov 2022 10:00) (101/77 - 158/101)  ABP(mean): 106 (23 Nov 2022 10:00) (90 - 124)  RR: 20 (24 Nov 2022 05:00) (11 - 20)  SpO2: 97% (24 Nov 2022 05:00) (97% - 100%)      I&O's Summary    22 Nov 2022 07:01  -  23 Nov 2022 07:00  --------------------------------------------------------  IN: 1442.4 mL / OUT: 1408 mL / NET: 34.4 mL    23 Nov 2022 07:01  -  24 Nov 2022 06:04  --------------------------------------------------------  IN: 1155 mL / OUT: 1300 mL / NET: -145 mL        PHYSICAL EXAM:    General: calm  CVS: RRR  Pulm: CTAB  GI: Soft, NTND  Extremities: No LE Edema  Neuro: Awake, alert, oriented to self only, FC, PERRL, EOMI, no facial,  ALARCON 5/5

## 2022-11-24 NOTE — PROGRESS NOTE ADULT - SUBJECTIVE AND OBJECTIVE BOX
THE PATIENT WAS SEEN AND EXAMINED BY ME WITH THE HOUSESTAFF AND STROKE TEAM DURING MORNING ROUNDS.   HPI:  66 y/o female with history of hypertension, dyslipidemia on ASA, new onset dementia, alcohol abuse (1/2-1 bottle of wine a day) presents after a fall. Patient unable to provide history. Patient apparently had an unwitnessed fall with trauma to the front of the head on 11/17. The next day the patient was found to be confused, at that time the patient was taken to [facility] where she was found to have an ICH and transferred [facility] for further management.   CTH w/cerebellar IPH with IVH in 4th/3rd vent, ventriculomegaly, decreased but patent prepontine cistern. No AC/AP. SBP 110s-140s. Plt 219. GCS 14. ICH score 2. SBP  Patient s/p EVD placement 11/18 (removed 11/23) emergently in the ER and transferred to NSCU.         SUBJECTIVE: No events overnight.  No new neurologic complaints.      acetaminophen     Tablet .. 650 milliGRAM(s) Oral every 6 hours PRN  chlorhexidine 4% Liquid 1 Application(s) Topical <User Schedule>  folic acid 1 milliGRAM(s) Oral daily  lisinopril 30 milliGRAM(s) Oral <User Schedule>  multivitamin/minerals 1 Tablet(s) Oral daily  polyethylene glycol 3350 17 Gram(s) Oral two times a day  QUEtiapine 12.5 milliGRAM(s) Oral every 6 hours PRN  senna 2 Tablet(s) Oral at bedtime  thiamine 100 milliGRAM(s) Oral daily      PHYSICAL EXAM:   Vital Signs Last 24 Hrs  T(C): 37.1 (24 Nov 2022 11:00), Max: 38.4 (23 Nov 2022 19:00)  T(F): 98.8 (24 Nov 2022 11:00), Max: 101.1 (23 Nov 2022 19:00)  HR: 74 (24 Nov 2022 11:00) (56 - 76)  BP: 153/60 (24 Nov 2022 11:00) (130/61 - 173/91)  BP(mean): 86 (24 Nov 2022 11:00) (79 - 119)  RR: 14 (24 Nov 2022 11:00) (11 - 20)  SpO2: 100% (24 Nov 2022 11:00) (97% - 100%)    Parameters below as of 24 Nov 2022 07:00  Patient On (Oxygen Delivery Method): room air        General: No acute distress  HEENT: EOM intact, visual fields full  Abdomen: Soft, nontender, nondistended   Extremities: No edema    NEUROLOGICAL EXAM:  Mental status: Awake, alert, oriented x3, no aphasia, no neglect, normal memory   Cranial Nerves: No facial asymmetry, no nystagmus, mild dysarthria,  tongue midline  Motor exam: Normal tone, no drift, 5/5 RUE, 5/5 RLE, 5/5 LUE, 5/5 LLE, normal fine finger movements.  Sensation: Intact to light touch   Coordination/ Gait: No dysmetria, AVRIL intact and symmetric bilaterally    LABS:                        13.2   8.55  )-----------( 232      ( 23 Nov 2022 22:00 )             37.7    11-23    135  |  99  |  9   ----------------------------<  114<H>  3.3<L>   |  25  |  0.47<L>    Ca    8.9      23 Nov 2022 22:00  Phos  3.1     11-23  Mg     2.1     11-23          IMAGING: Reviewed by me.     CT HEAD 11/24/22: Acute parenchymal hemorrhage involving the left cerebellar region is   again seen with surrounding edema. This finding measures approximately   2.6 x 2.3 cm and previously measured approximately 2.7 x 2.2 cm. Mass   effect on the fourth ventricle is again seen.    MRI HEAD 11/20/22: Left cerebellar hemorrhage with intraventricular extension   and mild hydrocephalus. Right frontal ventricular catheter. Nonspecific   white matter changes likely representing nonacute ischemia. Differential   diagnosis for hemorrhage includes hypertension, multiple cavernomas and   amyloid angiopathy.    CT HEAD 11/18/22: Intraventricular hemorrhage and hydrocephalus, unchanged. Left cerebellar   hemorrhage with fourth ventricular extension.    CTA H/N: Normal CTA of the head. No abnormal vascularity or aneurysm.   Mild left carotid calcification without significant stenosis.

## 2022-11-24 NOTE — PROGRESS NOTE ADULT - ASSESSMENT
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, ventriculomegaly, CTA without evidence of vascular malformation  Poss HTN bleed    NEURO:  - Neuorchecks Q4H   - EVD removed 11/23  - MRi brain done   - EEG no seizures   dcd eeg  - Stroke work up - stroke neurology on board  - on thiamine     PULM:  - RA    CV:  - HTN  SBP goal 100-160 mmHg, on  lisinopril 30mg  -  TTE w ef 80%  - DCd joy      RENAL:  - Fluids: IVL  - Na goal 135-145    GI: calorie count  - Diet: easy to chew diet ADAT  - GI prophylaxis [x] not indicated [] PPI [] other:  - Bowel regimen [x] Miralax [x] senna add dulcolax  - LBM 11/23  ENDO:   - Goal euglycemia (-180)  - A1c 5.6    HEME/ONC:  - H/H stable  - VTE prophylaxis: SCDs  - lovenox 40 mg sc qhs will check w nsgy if hold it tonight for poss evd pull      ID:  afebrile     ICU, dispo- floor  full code  at risk for deterioration due to cerebellar hemorrhage, brainstem compression, hydrocephalus   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, ventriculomegaly, CTA without evidence of vascular malformation  Poss HTN bleed    NEURO:  - Neuorchecks Q2H   - EVD removed 11/23  - MRi brain done   - EEG no seizures   dcd eeg  - Stroke work up - stroke neurology on board  - on thiamine   PT OT, SLP    PULM:  - RA    CV:  - HTN  SBP goal 100-160 mmHg, on  lisinopril 30mg  -  TTE w ef 80%  - DCd joy      RENAL:  - Fluids: IVL  - Na goal 135-145    GI:   - Diet: easy to chew diet ADAT  - GI prophylaxis [x] not indicated [] PPI [] other:  - Bowel regimen [x] Miralax [x] senna add dulcolax  - LBM 11/23    ENDO:   - Goal euglycemia (-180)  - A1c 5.6    HEME/ONC:  - H/H stable  - VTE prophylaxis: SCDs  - lovenox 40 mg sc qhs       ID:   febrile  , ua neg, FU blood cultures    ICU, dispo- stroke unit  full code  at risk for deterioration due to cerebellar hemorrhage, brainstem compression, hydrocephalus   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, ventriculomegaly, CTA without evidence of vascular malformation  Poss HTN bleed    NEURO:  - Neuorchecks Q2H   - EVD removed 11/23  - MRi brain done   - EEG no seizures   dcd eeg  - Stroke work up - stroke neurology on board  - on thiamine   PT OT, SLP    PULM:  - RA    CV:  - HTN  SBP goal 100-160 mmHg, on  lisinopril 30mg  -  TTE w ef 80%  - DCd joy      RENAL:  - Fluids: IVL  - Na goal 135-145    GI:   - Diet: easy to chew diet ADAT  - GI prophylaxis [x] not indicated [] PPI [] other:  - Bowel regimen [x] Miralax [x] senna add dulcolax  - LBM 11/23    ENDO:   - Goal euglycemia (-180)  - A1c 5.6    HEME/ONC:  - H/H stable  - VTE prophylaxis: SCDs  - lovenox 40 mg sc qhs       ID:   febrile  , ua neg, FU blood cultures    ICU, dispo- stroke unit  full code

## 2022-11-25 PROCEDURE — 99291 CRITICAL CARE FIRST HOUR: CPT

## 2022-11-25 PROCEDURE — 70450 CT HEAD/BRAIN W/O DYE: CPT | Mod: 26

## 2022-11-25 PROCEDURE — 99232 SBSQ HOSP IP/OBS MODERATE 35: CPT

## 2022-11-25 RX ORDER — ENOXAPARIN SODIUM 100 MG/ML
40 INJECTION SUBCUTANEOUS
Refills: 0 | Status: DISCONTINUED | OUTPATIENT
Start: 2022-11-25 | End: 2022-11-29

## 2022-11-25 RX ORDER — HYDRALAZINE HCL 50 MG
10 TABLET ORAL ONCE
Refills: 0 | Status: COMPLETED | OUTPATIENT
Start: 2022-11-25 | End: 2022-11-25

## 2022-11-25 RX ORDER — ACETAMINOPHEN 500 MG
1000 TABLET ORAL ONCE
Refills: 0 | Status: COMPLETED | OUTPATIENT
Start: 2022-11-25 | End: 2022-11-25

## 2022-11-25 RX ADMIN — Medication 1 MILLIGRAM(S): at 11:19

## 2022-11-25 RX ADMIN — Medication 10 MILLIGRAM(S): at 07:30

## 2022-11-25 RX ADMIN — Medication 1000 MILLIGRAM(S): at 01:30

## 2022-11-25 RX ADMIN — POLYETHYLENE GLYCOL 3350 17 GRAM(S): 17 POWDER, FOR SOLUTION ORAL at 17:17

## 2022-11-25 RX ADMIN — Medication 650 MILLIGRAM(S): at 17:30

## 2022-11-25 RX ADMIN — Medication 650 MILLIGRAM(S): at 17:00

## 2022-11-25 RX ADMIN — Medication 100 MILLIGRAM(S): at 11:19

## 2022-11-25 RX ADMIN — Medication 650 MILLIGRAM(S): at 08:45

## 2022-11-25 RX ADMIN — Medication 400 MILLIGRAM(S): at 01:00

## 2022-11-25 RX ADMIN — ENOXAPARIN SODIUM 40 MILLIGRAM(S): 100 INJECTION SUBCUTANEOUS at 17:17

## 2022-11-25 RX ADMIN — POLYETHYLENE GLYCOL 3350 17 GRAM(S): 17 POWDER, FOR SOLUTION ORAL at 05:51

## 2022-11-25 RX ADMIN — SENNA PLUS 2 TABLET(S): 8.6 TABLET ORAL at 22:20

## 2022-11-25 RX ADMIN — Medication 650 MILLIGRAM(S): at 09:15

## 2022-11-25 RX ADMIN — QUETIAPINE FUMARATE 12.5 MILLIGRAM(S): 200 TABLET, FILM COATED ORAL at 19:46

## 2022-11-25 RX ADMIN — QUETIAPINE FUMARATE 12.5 MILLIGRAM(S): 200 TABLET, FILM COATED ORAL at 21:26

## 2022-11-25 RX ADMIN — Medication 1 TABLET(S): at 11:19

## 2022-11-25 RX ADMIN — LISINOPRIL 30 MILLIGRAM(S): 2.5 TABLET ORAL at 13:20

## 2022-11-25 NOTE — PROGRESS NOTE ADULT - SUBJECTIVE AND OBJECTIVE BOX
NSICU Progress Note:    24 HOUR EVENTS:   1 episode of fever yesterday    captopril 25 milliGRAM(s) Oral every 8 hours  chlorhexidine 4% Liquid 1 Application(s) Topical <User Schedule>  enoxaparin Injectable 40 milliGRAM(s) SubCutaneous <User Schedule>  folic acid 1 milliGRAM(s) Oral daily  LORazepam   Injectable 1 milliGRAM(s) IV Push every 2 hours PRN  melatonin 5 milliGRAM(s) Oral at bedtime  multivitamin/minerals/iron Oral Solution (CENTRUM) 15 milliLiter(s) Oral daily  niCARdipine Infusion 5 mG/Hr IV Continuous <Continuous>  polyethylene glycol 3350 17 Gram(s) Oral two times a day  senna 2 Tablet(s) Oral at bedtime  sodium chloride 0.9%. 1000 milliLiter(s) IV Continuous <Continuous>  thiamine IVPB 500 milliGRAM(s) IV Intermittent every 8 hours    Vitals/labs/meds Reviewed    ICU Vital Signs Last 24 Hrs:    T(C): 37.3 (25 Nov 2022 03:00), Max: 37.3 (25 Nov 2022 03:00)  T(F): 99.1 (25 Nov 2022 03:00), Max: 99.1 (25 Nov 2022 03:00)  HR: 67 (25 Nov 2022 05:00) (60 - 78)  BP: 157/85 (25 Nov 2022 05:00) (120/82 - 197/95)  BP(mean): 104 (25 Nov 2022 05:00) (86 - 122)  ABP: --  ABP(mean): --  RR: 17 (25 Nov 2022 05:00) (14 - 19)  SpO2: 100% (25 Nov 2022 05:00) (97% - 100%)        I&O's Summary    22 Nov 2022 07:01  -  23 Nov 2022 07:00  --------------------------------------------------------  IN: 1442.4 mL / OUT: 1408 mL / NET: 34.4 mL    23 Nov 2022 07:01  -  24 Nov 2022 06:04  --------------------------------------------------------  IN: 1155 mL / OUT: 1300 mL / NET: -145 mL        PHYSICAL EXAM:    General: calm  CVS: RRR  Pulm: CTAB  GI: Soft, NTND  Extremities: No LE Edema  Neuro: awake, alert, oriented x1, not cooperative, asked to show 2 fingers and said 'i will just show you one' not fc, conversational, PERRL, EOMI, no facial,  ALARCON spont/AG

## 2022-11-25 NOTE — PROGRESS NOTE ADULT - ASSESSMENT
67F with history of hypertension, dyslipidemia, new onset dementia, alcohol abuse (1/2-1 bottle of wine a day) presents after an unwitnessed fall with trauma to the front of the head on 11/17. The next day the patient was found to be confused. 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 No AC/AP. Patient s/p EVD placement 11/18 (removed 11/23). CTA without evidence of vascular malformation.    Impression: Left cerebellar hemorrhage, intraventricular hemorrhage, hydrocephalus S/p EVD Etiology includes uncontrolled hypertension versus vascular lesion    NEURO: Neurologically improved since admission, Continue close monitoring for neurologic deterioration, SBP < 140,  Neurosurgery following for previous EVD. MRI Brain w/o noted above, plan for MRI HEAD with contrast in 4-6 weeks once hemorrhage is reabsorbed to rule out underling malformation.  EEG 11/22: Moderate diffuse/multi-focal cerebral dysfunction, not specific as to etiology.  There were no epileptiform abnormalities recorded x 1 day. Physical therapy/OT: AR    ANTITHROMBOTIC THERAPY: none indicated due to hemorrhage    PULMONARY: CXR clear 11/23, protecting airway, saturating well     CARDIOVASCULAR: TTE: EF >80%, Hyperdynamic left ventricular systolic function. , cardiac monitoring                              SBP goal: < 140    GASTROINTESTINAL:  dysphagia screen initially failed, now on diet       Diet: Regular    RENAL: BUN/Cr stable, good urine output      Na Goal: Greater than 135     Ann: N    HEMATOLOGY: H/H stable, Platelets 232     DVT ppx: venodynes, will started once CT head stable    ID: febrile on 11/23: so si/sx of infection, no leukocytosis, UA negative     OTHER: history of alcohol abuse ( 1/2-1 bottle of wine a day) was on CIWA, no signs of withdrawal  Plan of care discussed with patient and family at bedside. Accepted to stroke unit.    DISPOSITION: AR once stable and workup is complete      CORE MEASURES:        Admission NIHSS:      TPA: [] YES [x] NO      LDL/HDL: 163/64     Depression Screen: 0     Statin Therapy: N     Dysphagia Screen: [] PASS [x] FAIL     Smoking [] YES [x] NO      Afib [] YES [x] NO     Stroke Education [x] YES [] NO    Obtain screening lower extremity venous ultrasound in patients who meet 1 or more of the following criteria as patient is high risk for DVT/PE on admission:   [] History of DVT/PE  []Hypercoagulable states (Factor V Leiden, Cancer, OCP, etc. )  []Prolonged immobility (hemiplegia/hemiparesis/post operative or any other extended immobilization)  [] Transferred from outside facility (Rehab or Long term care)  [] Age </= to 50.

## 2022-11-25 NOTE — PROGRESS NOTE ADULT - SUBJECTIVE AND OBJECTIVE BOX
SUBJECTIVE/INTERVAL HISTORY:    PAST MEDICAL & SURGICAL HISTORY:    FAMILY HISTORY:      MEDICATIONS (HOME):  Home Medications:    MEDICATIONS  (STANDING):  chlorhexidine 4% Liquid 1 Application(s) Topical <User Schedule>  coronavirus bivalent (EUA) Booster Vaccine (PFIZER) 0.3 milliLiter(s) IntraMuscular once  folic acid 1 milliGRAM(s) Oral daily  hydrALAZINE Injectable 10 milliGRAM(s) IV Push once  lisinopril 30 milliGRAM(s) Oral <User Schedule>  multivitamin/minerals 1 Tablet(s) Oral daily  polyethylene glycol 3350 17 Gram(s) Oral two times a day  QUEtiapine 12.5 milliGRAM(s) Oral at bedtime  senna 2 Tablet(s) Oral at bedtime  thiamine 100 milliGRAM(s) Oral daily    MEDICATIONS  (PRN):  acetaminophen     Tablet .. 650 milliGRAM(s) Oral every 6 hours PRN Mild Pain (1 - 3)  QUEtiapine 12.5 milliGRAM(s) Oral every 6 hours PRN agitation    ALLERGIES/INTOLERANCES:  Allergies  No Known Allergies    Intolerances    VITALS & EXAMINATION:  Vital Signs Last 24 Hrs  T(C): 36.7 (2022 07:00), Max: 37.3 (2022 03:00)  T(F): 98 (2022 07:00), Max: 99.1 (2022 03:00)  HR: 84 (2022 08:00) (60 - 84)  BP: 148/75 (2022 08:00) (120/82 - 197/95)  BP(mean): 95 (2022 08:00) (86 - 125)  RR: 16 (2022 08:00) (14 - 19)  SpO2: 99% (2022 08:00) (98% - 100%)    Parameters below as of 2022 07:00  Patient On (Oxygen Delivery Method): room air         LABORATORY:  CBC                       13.2   8.55  )-----------( 232      ( 2022 22:00 )             37.7     Chem 11-24    136  |  100  |  8   ----------------------------<  166<H>  4.1   |  27  |  0.50    Ca    9.5      2022 21:21  Phos  1.8       Mg     2.2           LFTs   Coagulopathy   Lipid Panel  Chol 250<H> LDL -- HDL 64 Trig 118  A1c   Cardiac enzymes     U/A Urinalysis Basic - ( 2022 19:03 )    Color: Yellow / Appearance: Clear / S.019 / pH: x  Gluc: x / Ketone: Moderate  / Bili: Negative / Urobili: Negative   Blood: x / Protein: Trace / Nitrite: Negative   Leuk Esterase: Negative / RBC: x / WBC x   Sq Epi: x / Non Sq Epi: x / Bacteria: x        STUDIES & IMAGING:    CT HEAD 22: Acute parenchymal hemorrhage involving the left cerebellar region is   again seen with surrounding edema. This finding measures approximately   2.6 x 2.3 cm and previously measured approximately 2.7 x 2.2 cm. Mass   effect on the fourth ventricle is again seen.    MRI HEAD 22: Left cerebellar hemorrhage with intraventricular extension   and mild hydrocephalus. Right frontal ventricular catheter. Nonspecific   white matter changes likely representing nonacute ischemia. Differential   diagnosis for hemorrhage includes hypertension, multiple cavernomas and   amyloid angiopathy.    CT HEAD 22: Intraventricular hemorrhage and hydrocephalus, unchanged. Left cerebellar   hemorrhage with fourth ventricular extension.    CTA H/N: Normal CTA of the head. No abnormal vascularity or aneurysm.   Mild left carotid calcification without significant stenosis.

## 2022-11-25 NOTE — PROGRESS NOTE ADULT - ASSESSMENT
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, ventriculomegaly, CTA without evidence of vascular malformation  Poss HTN bleed    NEURO:  - Neuorchecks Q4H   - EVD removed 11/23  cth in am to fu tract heme  - MRi brain done   - EEG no seizures   dcd eeg  - Stroke work up - stroke neurology on board  - on thiamine   PT OT, SLP    PULM:  - RA    CV:  - HTN  SBP goal 100-160 mmHg, on  lisinopril 30mg  -  TTE w ef 80%  - DCd joy      RENAL:  - Fluids: IVL  - Na goal 135-145    GI:   - Diet: easy to chew diet ADAT  - GI prophylaxis [x] not indicated [] PPI [] other:  - Bowel regimen [x] Miralax [x] senna add dulcolax  - LBM 11/23    ENDO:   - Goal euglycemia (-180)  - A1c 5.6    HEME/ONC:  - H/H stable  - VTE prophylaxis: SCDs  - lovenox 40 mg sc qhs       ID:   febrile  , ua neg, FU blood cultures    ICU, dispo- stroke unit  full code

## 2022-11-25 NOTE — PROGRESS NOTE ADULT - THIS PATIENT HAS THE FOLLOWING CONDITION(S)/DIAGNOSES ON THIS ADMISSION:
Brain Compression / Herniation
Cerebral Edema/Brain Compression / Herniation
Brain Compression / Herniation
Brain Compression / Herniation
Cerebral Edema/Brain Compression / Herniation
None
None
Cerebral Edema/Brain Compression / Herniation

## 2022-11-25 NOTE — PROGRESS NOTE ADULT - SUBJECTIVE AND OBJECTIVE BOX
NSICU Progress Note:    24 HOUR EVENTS:   1 episode of fever yesterday    captopril 25 milliGRAM(s) Oral every 8 hours  chlorhexidine 4% Liquid 1 Application(s) Topical <User Schedule>  enoxaparin Injectable 40 milliGRAM(s) SubCutaneous <User Schedule>  folic acid 1 milliGRAM(s) Oral daily  LORazepam   Injectable 1 milliGRAM(s) IV Push every 2 hours PRN  melatonin 5 milliGRAM(s) Oral at bedtime  multivitamin/minerals/iron Oral Solution (CENTRUM) 15 milliLiter(s) Oral daily  niCARdipine Infusion 5 mG/Hr IV Continuous <Continuous>  polyethylene glycol 3350 17 Gram(s) Oral two times a day  senna 2 Tablet(s) Oral at bedtime  sodium chloride 0.9%. 1000 milliLiter(s) IV Continuous <Continuous>  thiamine IVPB 500 milliGRAM(s) IV Intermittent every 8 hours    Vitals/labs/meds Reviewed    ICU Vital Signs Last 24 Hrs:    T(C): 37.3 (25 Nov 2022 03:00), Max: 37.3 (25 Nov 2022 03:00)  T(F): 99.1 (25 Nov 2022 03:00), Max: 99.1 (25 Nov 2022 03:00)  HR: 67 (25 Nov 2022 05:00) (60 - 78)  BP: 157/85 (25 Nov 2022 05:00) (120/82 - 197/95)  BP(mean): 104 (25 Nov 2022 05:00) (86 - 122)  ABP: --  ABP(mean): --  RR: 17 (25 Nov 2022 05:00) (14 - 19)  SpO2: 100% (25 Nov 2022 05:00) (97% - 100%)        I&O's Summary    22 Nov 2022 07:01  -  23 Nov 2022 07:00  --------------------------------------------------------  IN: 1442.4 mL / OUT: 1408 mL / NET: 34.4 mL    23 Nov 2022 07:01  -  24 Nov 2022 06:04  --------------------------------------------------------  IN: 1155 mL / OUT: 1300 mL / NET: -145 mL        PHYSICAL EXAM:    General: calm  CVS: RRR  Pulm: CTAB  GI: Soft, NTND  Extremities: No LE Edema  Neuro: awake, alert, oriented x1, FC, conversational, PERRL, EOMI, no facial,  ALARCON spont/AG                 NSICU Progress Note:    24 HOUR EVENTS:   1 episode of fever yesterday    captopril 25 milliGRAM(s) Oral every 8 hours  chlorhexidine 4% Liquid 1 Application(s) Topical <User Schedule>  enoxaparin Injectable 40 milliGRAM(s) SubCutaneous <User Schedule>  folic acid 1 milliGRAM(s) Oral daily  LORazepam   Injectable 1 milliGRAM(s) IV Push every 2 hours PRN  melatonin 5 milliGRAM(s) Oral at bedtime  multivitamin/minerals/iron Oral Solution (CENTRUM) 15 milliLiter(s) Oral daily  niCARdipine Infusion 5 mG/Hr IV Continuous <Continuous>  polyethylene glycol 3350 17 Gram(s) Oral two times a day  senna 2 Tablet(s) Oral at bedtime  sodium chloride 0.9%. 1000 milliLiter(s) IV Continuous <Continuous>  thiamine IVPB 500 milliGRAM(s) IV Intermittent every 8 hours    Vitals/labs/meds Reviewed    ICU Vital Signs Last 24 Hrs:    T(C): 37.3 (25 Nov 2022 03:00), Max: 37.3 (25 Nov 2022 03:00)  T(F): 99.1 (25 Nov 2022 03:00), Max: 99.1 (25 Nov 2022 03:00)  HR: 67 (25 Nov 2022 05:00) (60 - 78)  BP: 157/85 (25 Nov 2022 05:00) (120/82 - 197/95)  BP(mean): 104 (25 Nov 2022 05:00) (86 - 122)  ABP: --  ABP(mean): --  RR: 17 (25 Nov 2022 05:00) (14 - 19)  SpO2: 100% (25 Nov 2022 05:00) (97% - 100%)        I&O's Summary    22 Nov 2022 07:01  -  23 Nov 2022 07:00  --------------------------------------------------------  IN: 1442.4 mL / OUT: 1408 mL / NET: 34.4 mL    23 Nov 2022 07:01  -  24 Nov 2022 06:04  --------------------------------------------------------  IN: 1155 mL / OUT: 1300 mL / NET: -145 mL        PHYSICAL EXAM:    General: calm  CVS: RRR  Pulm: CTAB  GI: Soft, NTND  Extremities: No LE Edema  Neuro: awake, alert, oriented x1, not cooperative, asked to show 2 fingers and said 'i will just show you one' not fc, conversational, PERRL, EOMI, no facial,  ALARCON spont/AG

## 2022-11-25 NOTE — PROGRESS NOTE ADULT - ATTENDING COMMENTS
66 yo woman with HTN, HLD, EtOH use disorder and dementia, admitted 11/18 with fall, CTH with a L cerebellar ICH with IVH, CTA negative for vascular malformation. CTH stable 5 hours later. ICH score 2. S/p EVD. Etiology of cerebellar hemorrhage is likely HTN. MRI brain negative for underlying lesion.     CTH stable this AM.   EVD at 15cm H2O, output 28cc for 24 hours.   VEEG neg for seizures.     On exam briefly off precedex, agitated, does not answer orientation questions or follow commands, PERRL, EOMI, face symmetric, moves all 4 extremities 5/5     clamp EVD   precedex for sedation  d/c CIWA protocol   thiamine   convert captopril to lisinopril, SBP goal 100-160   hold Lov ppx for EVD clamp trial    Patient seen and examined by attending on 11/22/2022.    Patient is critically ill due to cerebellar ICH with IVH and at high risk for neurological deterioration or death due to: with hydrocephalus requiring an EVD for CSF diversion, undergoing a clamp trial.
66 yo woman with HTN, HLD, EtOH use disorder and dementia, admitted 11/18 with fall, CTH with a L cerebellar ICH with IVH, CTA negative for vascular malformation. ICH score 2. S/p EVD, removed 11/23. Etiology of cerebellar hemorrhage is likely HTN. MRI brain negative for underlying lesion.     S/p Seroquel last evening.   CTH stable this AM.   Afebrile.     Calm, oriented to her name, follows commands, PERRL, EOMI, face symmetric, moves all 4 extremities 5/5     stable for transfer out of ICU    Seroquel in the PM PRN  thiamine   lisinopril 30 mg daily, SBP goal 100-160   f/u panculture, UA neg, off abx for now  Lov tonight     Patient seen and examined by attending on 11/25/2022.    Patient is not critically ill but is medically complex due to cerebellar ICH with IVH and delirium.
66 yo woman with HTN, HLD, EtOH use disorder and dementia, admitted 11/18 with fall, CTH with a L cerebellar ICH with IVH, CTA negative for vascular malformation. ICH score 2. S/p EVD, removed 11/23. Etiology of cerebellar hemorrhage is likely HTN. MRI brain negative for underlying lesion.     CTH stable this AM.   Febrile overnight, pancultured. On RA, VS stable.     Calm, oriented to her name (Kamille- middle name), states she is in [place] but not hospital, follows commands, speech is fluent, PERRL, EOMI, face symmetric, moves all 4 extremities 5/5     stable for transfer to the stroke unit     thiamine   lisinopril 30 mg daily, SBP goal 100-160   f/u panculture, UA neg, off abx for now  hold DVT ppx tonight for tiny tract hemorrhage, restart tomorrow     Patient seen and examined by attending on 11/24/2022.    Patient is not critically ill but is medically complex due to cerebellar ICH with IVH and delirium.
68 yo woman with HTN, HLD, EtOH use disorder and dementia, admitted 11/18 with fall, CTH with a L cerebellar ICH with IVH, CTA negative for vascular malformation. CTH stable 5 hours later. ICH score 2. S/p EVD. Etiology of cerebellar hemorrhage is likely HTN.  On cardene.   EVD at 10cm H2O, output 75cc until 7am.     On exam, alert, oriented to self only, states she is at home, follows commands, EOMI, face symmetric, no PD, no LE drift.   With some mild tongue fascinations.    c/w EVD at 10cm H2O  CTH in am  add thiamine 500 mg q8h   load phenobarb 260mg for EtOH withdrawal   MRI brain w/wo, not urgent  add captopril to wean cardene  Na goal 135-145  start Lov tomorrow if CTH stable     I personally updated the patient's son and daughter at bedside today.     Patient seen and examined by attending on 11/19/2022.    Patient is critically ill due to cerebellar ICU with IVH and at high risk for neurological deterioration or death due to: potential expansion of cerebellar ICH and posterior fossa edema, requiring an EVD for CSF diversion.
66 yo woman with HTN, HLD, EtOH use disorder and dementia, admitted 11/18 with fall, CTH with a L cerebellar ICH with IVH, CTA negative for vascular malformation. CTH stable 5 hours later. ICH score 2. S/p EVD. Etiology of cerebellar hemorrhage is likely HTN.    Agitated overnight s/p phenobarb and Ativan.   CTH this AM with improved hydrocephalus   On cardene at 7.5  EVD at 10cm H2O, output 160cc for 24 hours.     On exam post Ativan, calm, oriented to self only, follows commands, PERRL, EOMI, face symmetric, moves all 4 extremities 5/5     c/w EVD at 10cm H2O  add thiamine 500 mg q8h   s/p total of 520mg phenobarb load in divided doses for EtOH withdrawal, CIWA   MRI brain w/wo, not urgent  c/w captopril 25 mg q8h to wean cardene, SBP goal 100-160   Na goal 140-145  start Lov ppx    Patient seen and examined by attending on 11/20/2022.    Patient is critically ill due to cerebellar ICH with IVH and at high risk for neurological deterioration or death due to: potential expansion of cerebellar ICH and posterior fossa edema, with hydrocephalus requiring an EVD for CSF diversion.
66 yo woman with HTN, HLD, EtOH use disorder and dementia, admitted 11/18 with fall, CTH with a L cerebellar ICH with IVH, CTA negative for vascular malformation. CTH stable 5 hours later. ICH score 2. S/p EVD. Etiology of cerebellar hemorrhage is likely HTN. MRI brain negative for underlying lesion.     CTH stable this AM.   EVD at 15cm H2O, output 28cc for 24 hours.   VEEG neg for seizures.     On exam briefly off precedex, agitated, does not answer orientation questions or follow commands, PERRL, EOMI, face symmetric, moves all 4 extremities 5/5     clamp EVD   precedex for sedation  d/c CIWA protocol   thiamine   convert captopril to lisinopril, SBP goal 100-160   hold Lov ppx for EVD clamp trial    Patient seen and examined by attending on 11/22/2022.    Patient is critically ill due to cerebellar ICH with IVH and at high risk for neurological deterioration or death due to: with hydrocephalus requiring an EVD for CSF diversion, undergoing a clamp trial.
Agree with/edited as appropriate above

## 2022-11-26 LAB
ANION GAP SERPL CALC-SCNC: 13 MMOL/L — SIGNIFICANT CHANGE UP (ref 5–17)
APPEARANCE UR: ABNORMAL
BACTERIA # UR AUTO: ABNORMAL
BILIRUB UR-MCNC: NEGATIVE — SIGNIFICANT CHANGE UP
BUN SERPL-MCNC: 10 MG/DL — SIGNIFICANT CHANGE UP (ref 7–23)
CALCIUM SERPL-MCNC: 9.2 MG/DL — SIGNIFICANT CHANGE UP (ref 8.4–10.5)
CHLORIDE SERPL-SCNC: 99 MMOL/L — SIGNIFICANT CHANGE UP (ref 96–108)
CO2 SERPL-SCNC: 23 MMOL/L — SIGNIFICANT CHANGE UP (ref 22–31)
COLOR SPEC: YELLOW — SIGNIFICANT CHANGE UP
CREAT SERPL-MCNC: 0.52 MG/DL — SIGNIFICANT CHANGE UP (ref 0.5–1.3)
DIFF PNL FLD: NEGATIVE — SIGNIFICANT CHANGE UP
EGFR: 103 ML/MIN/1.73M2 — SIGNIFICANT CHANGE UP
EPI CELLS # UR: 1 /HPF — SIGNIFICANT CHANGE UP
GLUCOSE SERPL-MCNC: 107 MG/DL — HIGH (ref 70–99)
GLUCOSE UR QL: NEGATIVE — SIGNIFICANT CHANGE UP
HCT VFR BLD CALC: 38.2 % — SIGNIFICANT CHANGE UP (ref 34.5–45)
HGB BLD-MCNC: 13.3 G/DL — SIGNIFICANT CHANGE UP (ref 11.5–15.5)
HYALINE CASTS # UR AUTO: 1 /LPF — SIGNIFICANT CHANGE UP (ref 0–2)
KETONES UR-MCNC: NEGATIVE — SIGNIFICANT CHANGE UP
LEUKOCYTE ESTERASE UR-ACNC: ABNORMAL
MAGNESIUM SERPL-MCNC: 2.2 MG/DL — SIGNIFICANT CHANGE UP (ref 1.6–2.6)
MCHC RBC-ENTMCNC: 32.2 PG — SIGNIFICANT CHANGE UP (ref 27–34)
MCHC RBC-ENTMCNC: 34.8 GM/DL — SIGNIFICANT CHANGE UP (ref 32–36)
MCV RBC AUTO: 92.5 FL — SIGNIFICANT CHANGE UP (ref 80–100)
NITRITE UR-MCNC: POSITIVE
NRBC # BLD: 0 /100 WBCS — SIGNIFICANT CHANGE UP (ref 0–0)
PH UR: 7 — SIGNIFICANT CHANGE UP (ref 5–8)
PHOSPHATE SERPL-MCNC: 4.1 MG/DL — SIGNIFICANT CHANGE UP (ref 2.5–4.5)
PLATELET # BLD AUTO: 285 K/UL — SIGNIFICANT CHANGE UP (ref 150–400)
POTASSIUM SERPL-MCNC: 4 MMOL/L — SIGNIFICANT CHANGE UP (ref 3.5–5.3)
POTASSIUM SERPL-SCNC: 4 MMOL/L — SIGNIFICANT CHANGE UP (ref 3.5–5.3)
PROT UR-MCNC: NEGATIVE — SIGNIFICANT CHANGE UP
RBC # BLD: 4.13 M/UL — SIGNIFICANT CHANGE UP (ref 3.8–5.2)
RBC # FLD: 11.7 % — SIGNIFICANT CHANGE UP (ref 10.3–14.5)
RBC CASTS # UR COMP ASSIST: 4 /HPF — SIGNIFICANT CHANGE UP (ref 0–4)
SODIUM SERPL-SCNC: 135 MMOL/L — SIGNIFICANT CHANGE UP (ref 135–145)
SP GR SPEC: 1.01 — LOW (ref 1.01–1.02)
UROBILINOGEN FLD QL: NEGATIVE — SIGNIFICANT CHANGE UP
WBC # BLD: 8.12 K/UL — SIGNIFICANT CHANGE UP (ref 3.8–10.5)
WBC # FLD AUTO: 8.12 K/UL — SIGNIFICANT CHANGE UP (ref 3.8–10.5)
WBC UR QL: 25 /HPF — HIGH (ref 0–5)

## 2022-11-26 PROCEDURE — 99233 SBSQ HOSP IP/OBS HIGH 50: CPT

## 2022-11-26 RX ORDER — LISINOPRIL 2.5 MG/1
40 TABLET ORAL DAILY
Refills: 0 | Status: DISCONTINUED | OUTPATIENT
Start: 2022-11-26 | End: 2022-11-29

## 2022-11-26 RX ORDER — HYDRALAZINE HCL 50 MG
5 TABLET ORAL ONCE
Refills: 0 | Status: COMPLETED | OUTPATIENT
Start: 2022-11-26 | End: 2022-11-26

## 2022-11-26 RX ORDER — CEFTRIAXONE 500 MG/1
1000 INJECTION, POWDER, FOR SOLUTION INTRAMUSCULAR; INTRAVENOUS EVERY 24 HOURS
Refills: 0 | Status: COMPLETED | OUTPATIENT
Start: 2022-11-26 | End: 2022-11-28

## 2022-11-26 RX ADMIN — QUETIAPINE FUMARATE 12.5 MILLIGRAM(S): 200 TABLET, FILM COATED ORAL at 08:54

## 2022-11-26 RX ADMIN — Medication 650 MILLIGRAM(S): at 03:54

## 2022-11-26 RX ADMIN — POLYETHYLENE GLYCOL 3350 17 GRAM(S): 17 POWDER, FOR SOLUTION ORAL at 06:36

## 2022-11-26 RX ADMIN — ENOXAPARIN SODIUM 40 MILLIGRAM(S): 100 INJECTION SUBCUTANEOUS at 17:40

## 2022-11-26 RX ADMIN — QUETIAPINE FUMARATE 12.5 MILLIGRAM(S): 200 TABLET, FILM COATED ORAL at 21:04

## 2022-11-26 RX ADMIN — Medication 1 TABLET(S): at 14:38

## 2022-11-26 RX ADMIN — CEFTRIAXONE 100 MILLIGRAM(S): 500 INJECTION, POWDER, FOR SOLUTION INTRAMUSCULAR; INTRAVENOUS at 06:40

## 2022-11-26 RX ADMIN — SENNA PLUS 2 TABLET(S): 8.6 TABLET ORAL at 21:04

## 2022-11-26 RX ADMIN — Medication 100 MILLIGRAM(S): at 14:38

## 2022-11-26 RX ADMIN — Medication 1 MILLIGRAM(S): at 14:38

## 2022-11-26 RX ADMIN — Medication 650 MILLIGRAM(S): at 04:40

## 2022-11-26 RX ADMIN — Medication 5 MILLIGRAM(S): at 04:18

## 2022-11-26 RX ADMIN — LISINOPRIL 40 MILLIGRAM(S): 2.5 TABLET ORAL at 14:38

## 2022-11-26 NOTE — PROVIDER CONTACT NOTE (OTHER) - ASSESSMENT
Pt is agitated
urine is turbid and foul smelling
Arousable to voice. Neuro examination participation based. A&Ox1 with confusion. VSS. BP WDL on Cardene 2.5mcg/kg/min
patient's neuro exam unchanged. BP elevated.
Neuro exam unchanged. ICP 25 , /83. Patient restless.

## 2022-11-26 NOTE — PROGRESS NOTE ADULT - SUBJECTIVE AND OBJECTIVE BOX
THE PATIENT WAS SEEN AND EXAMINED BY ME WITH THE HOUSESTAFF AND STROKE TEAM DURING MORNING ROUNDS.   HPI:  66 y/o female with history of hypertension, dyslipidemia on ASA, new onset dementia, alcohol abuse (1/2-1 bottle of wine a day) presents after a fall. Patient unable to provide history. Patient apparently had an unwitnessed fall with trauma to the front of the head on . The next day the patient was found to be confused, at that time the patient was taken to [facility] where she was found to have an ICH and transferred [facility] for further management.   CTH w/cerebellar IPH with IVH in / vent, ventriculomegaly, decreased but patent prepontine cistern. No AC/AP. SBP 110s-140s. Plt 219. GCS 14. ICH score 2. SBP  Patient s/p EVD placement  (removed ) emergently in the ER and transferred to NSCU.         SUBJECTIVE: No events overnight.  No new neurologic complaints.      MEDICATIONS  (STANDING):  cefTRIAXone   IVPB 1000 milliGRAM(s) IV Intermittent every 24 hours  coronavirus bivalent (EUA) Booster Vaccine (PFIZER) 0.3 milliLiter(s) IntraMuscular once  enoxaparin Injectable 40 milliGRAM(s) SubCutaneous <User Schedule>  folic acid 1 milliGRAM(s) Oral daily  lisinopril 40 milliGRAM(s) Oral daily  multivitamin/minerals 1 Tablet(s) Oral daily  polyethylene glycol 3350 17 Gram(s) Oral two times a day  QUEtiapine 12.5 milliGRAM(s) Oral at bedtime  senna 2 Tablet(s) Oral at bedtime  thiamine 100 milliGRAM(s) Oral daily    MEDICATIONS  (PRN):  acetaminophen     Tablet .. 650 milliGRAM(s) Oral every 6 hours PRN Mild Pain (1 - 3)  QUEtiapine 12.5 milliGRAM(s) Oral every 6 hours PRN agitation      PHYSICAL EXAM:   Vital Signs Last 24 Hrs  T(C): 36.8 (2022 08:00), Max: 37.1 (2022 19:15)  T(F): 98.3 (2022 08:00), Max: 98.7 (2022 19:15)  HR: 68 (2022 12:00) (68 - 94)  BP: 117/66 (2022 12:00) (117/66 - 168/89)  BP(mean): 81 (2022 12:00) (81 - 119)  RR: 16 (2022 12:00) (13 - 25)  SpO2: 96% (2022 12:00) (95% - 100%)    General: No acute distress  HEENT: EOM intact, visual fields full  Abdomen: Soft, nontender, nondistended   Extremities: No edema    NEUROLOGICAL EXAM:  Mental status: Awake, alert, oriented x3, no aphasia, no neglect, normal memory   Cranial Nerves: No facial asymmetry, no nystagmus, mild dysarthria,  tongue midline  Motor exam: Normal tone, no drift, 5/5 RUE, 5/5 RLE, 5/5 LUE, 5/5 LLE, normal fine finger movements.  Sensation: Intact to light touch   Coordination/ Gait: No dysmetria, AVRIL intact and symmetric bilaterally    LABS:                                   13.3   8.12  )-----------( 285      ( 2022 04:38 )             38.2         135  |  99  |  10  ----------------------------<  107<H>  4.0   |  23  |  0.52    Ca    9.2      2022 04:39  Phos  4.1       Mg     2.2         Urinalysis Basic - ( 2022 00:36 )  Color: Yellow / Appearance: Slightly Turbid / S.009 / pH: x  Gluc: x / Ketone: Negative  / Bili: Negative / Urobili: Negative   Blood: x / Protein: Negative / Nitrite: Positive   Leuk Esterase: Large / RBC: 4 /hpf / WBC 25 /HPF   Sq Epi: x / Non Sq Epi: 1 /hpf / Bacteria: Moderate    IMAGING: Reviewed by me.   CT Head No Cont (22 @ 09:12)   Evolving left cerebellar hematoma with surrounding lucency as seen on the   prior and mass effect on the fourth ventricle. No change in appearance of   the hematoma in the left cerebellum as well as trace heme in the right   frontal region along the prior shunt tract.    CT HEAD 22: Acute parenchymal hemorrhage involving the left cerebellar region is   again seen with surrounding edema. This finding measures approximately   2.6 x 2.3 cm and previously measured approximately 2.7 x 2.2 cm. Mass   effect on the fourth ventricle is again seen.    MRI HEAD 22: Left cerebellar hemorrhage with intraventricular extension   and mild hydrocephalus. Right frontal ventricular catheter. Nonspecific   white matter changes likely representing nonacute ischemia. Differential   diagnosis for hemorrhage includes hypertension, multiple cavernomas and   amyloid angiopathy.    CT HEAD 22: Intraventricular hemorrhage and hydrocephalus, unchanged. Left cerebellar   hemorrhage with fourth ventricular extension.    CTA H/N: Normal CTA of the head. No abnormal vascularity or aneurysm.   Mild left carotid calcification without significant stenosis.

## 2022-11-26 NOTE — PROVIDER CONTACT NOTE (OTHER) - REASON
ICP 25 , /83. Patient restless.
Patient's /88.
Patient refusing PO meds and food trays
Pt is agitated
urine is turbid and foul smelling

## 2022-11-26 NOTE — PROGRESS NOTE ADULT - ASSESSMENT
67F with history of hypertension, dyslipidemia, new onset dementia, alcohol abuse (1/2-1 bottle of wine a day) presents after an unwitnessed fall with trauma to the front of the head on 11/17. The next day the patient was found to be confused. 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 No AC/AP. Patient s/p EVD placement 11/18 (removed 11/23). CTA without evidence of vascular malformation.    Impression: Left cerebellar hemorrhage, intraventricular hemorrhage, hydrocephalus S/P EVD Etiology includes uncontrolled hypertension versus vascular lesion    NEURO: Neurologically improved since admission, Continue close monitoring for neurologic deterioration, SBP < 140,  Neurosurgery following for previous EVD. MRI Brain w/o noted above, plan for MRI HEAD with contrast in 4-6 weeks once hemorrhage is reabsorbed to rule out underling malformation.  EEG 11/22: Moderate diffuse/multi-focal cerebral dysfunction, not specific as to etiology.  There were no epileptiform abnormalities recorded x 1 day. Physical therapy/OT: AR    ANTITHROMBOTIC THERAPY: none indicated due to hemorrhage    PULMONARY: CXR clear 11/23, protecting airway, saturating well     CARDIOVASCULAR: TTE: EF >80%, Hyperdynamic left ventricular systolic function. Cardiac monitoring. Increased Lisinopril to 40mg PO daily 11/26 to achieve gradual normotension                              SBP goal: < 140    GASTROINTESTINAL:  dysphagia screen initially failed, now on diet       Diet: Regular    RENAL: BUN/Cr stable, good urine output      Na Goal: Greater than 135     Ann: N    HEMATOLOGY: H/H stable, Platelets 232     DVT ppx: venodynes, will started once CT head stable    ID: febrile on 11/23: so si/sx of infection, no leukocytosis, UA negative     OTHER: history of alcohol abuse (1/2-1 bottle of wine a day) was on CIWA, no signs of withdrawal w Normal Heart Rhythm  Plan of care discussed with patient and family at bedside.     DISPOSITION: AR once stable and workup is complete    CORE MEASURES:        Admission NIHSS:      TPA: [] YES [x] NO      LDL/HDL: 163/64     Depression Screen: 0     Statin Therapy: N     Dysphagia Screen: [] PASS [x] FAIL     Smoking [] YES [x] NO      Afib [] YES [x] NO     Stroke Education [x] YES [] NO    Obtain screening lower extremity venous ultrasound in patients who meet 1 or more of the following criteria as patient is high risk for DVT/PE on admission:   [] History of DVT/PE  []Hypercoagulable states (Factor V Leiden, Cancer, OCP, etc. )  []Prolonged immobility (hemiplegia/hemiparesis/post operative or any other extended immobilization)  [] Transferred from outside facility (Rehab or Long term care)  [] Age </= to 50.

## 2022-11-26 NOTE — PROVIDER CONTACT NOTE (OTHER) - ACTION/TREATMENT ORDERED:
NSCU team to assess
IV tylenol 1 gm given .
Soft vest and Dmo, wrist restraint ordered
Urine sent for UA
Hydralazine 5 mg IV push given,

## 2022-11-26 NOTE — PROVIDER CONTACT NOTE (OTHER) - SITUATION
Patient's /88. Neuro exam unchanged.
ICP 25 , /83. Patient restless.
Pt is agitated
Pt refusing PO medication and food trays despite encouragement
urine is turbid and foul smelling

## 2022-11-27 LAB
ANION GAP SERPL CALC-SCNC: 11 MMOL/L — SIGNIFICANT CHANGE UP (ref 5–17)
BUN SERPL-MCNC: 11 MG/DL — SIGNIFICANT CHANGE UP (ref 7–23)
CALCIUM SERPL-MCNC: 9.6 MG/DL — SIGNIFICANT CHANGE UP (ref 8.4–10.5)
CHLORIDE SERPL-SCNC: 99 MMOL/L — SIGNIFICANT CHANGE UP (ref 96–108)
CO2 SERPL-SCNC: 26 MMOL/L — SIGNIFICANT CHANGE UP (ref 22–31)
CREAT SERPL-MCNC: 0.62 MG/DL — SIGNIFICANT CHANGE UP (ref 0.5–1.3)
EGFR: 99 ML/MIN/1.73M2 — SIGNIFICANT CHANGE UP
GLUCOSE SERPL-MCNC: 112 MG/DL — HIGH (ref 70–99)
HCT VFR BLD CALC: 38.2 % — SIGNIFICANT CHANGE UP (ref 34.5–45)
HGB BLD-MCNC: 13.3 G/DL — SIGNIFICANT CHANGE UP (ref 11.5–15.5)
MCHC RBC-ENTMCNC: 32.3 PG — SIGNIFICANT CHANGE UP (ref 27–34)
MCHC RBC-ENTMCNC: 34.8 GM/DL — SIGNIFICANT CHANGE UP (ref 32–36)
MCV RBC AUTO: 92.7 FL — SIGNIFICANT CHANGE UP (ref 80–100)
NRBC # BLD: 0 /100 WBCS — SIGNIFICANT CHANGE UP (ref 0–0)
PLATELET # BLD AUTO: 305 K/UL — SIGNIFICANT CHANGE UP (ref 150–400)
POTASSIUM SERPL-MCNC: 4 MMOL/L — SIGNIFICANT CHANGE UP (ref 3.5–5.3)
POTASSIUM SERPL-SCNC: 4 MMOL/L — SIGNIFICANT CHANGE UP (ref 3.5–5.3)
RBC # BLD: 4.12 M/UL — SIGNIFICANT CHANGE UP (ref 3.8–5.2)
RBC # FLD: 11.8 % — SIGNIFICANT CHANGE UP (ref 10.3–14.5)
SARS-COV-2 RNA SPEC QL NAA+PROBE: SIGNIFICANT CHANGE UP
SODIUM SERPL-SCNC: 136 MMOL/L — SIGNIFICANT CHANGE UP (ref 135–145)
WBC # BLD: 5.49 K/UL — SIGNIFICANT CHANGE UP (ref 3.8–10.5)
WBC # FLD AUTO: 5.49 K/UL — SIGNIFICANT CHANGE UP (ref 3.8–10.5)

## 2022-11-27 PROCEDURE — 99233 SBSQ HOSP IP/OBS HIGH 50: CPT

## 2022-11-27 RX ADMIN — Medication 1 TABLET(S): at 08:52

## 2022-11-27 RX ADMIN — QUETIAPINE FUMARATE 12.5 MILLIGRAM(S): 200 TABLET, FILM COATED ORAL at 22:02

## 2022-11-27 RX ADMIN — CEFTRIAXONE 100 MILLIGRAM(S): 500 INJECTION, POWDER, FOR SOLUTION INTRAMUSCULAR; INTRAVENOUS at 04:57

## 2022-11-27 RX ADMIN — Medication 650 MILLIGRAM(S): at 18:50

## 2022-11-27 RX ADMIN — Medication 100 MILLIGRAM(S): at 08:52

## 2022-11-27 RX ADMIN — SENNA PLUS 2 TABLET(S): 8.6 TABLET ORAL at 22:01

## 2022-11-27 RX ADMIN — Medication 650 MILLIGRAM(S): at 11:50

## 2022-11-27 RX ADMIN — Medication 650 MILLIGRAM(S): at 02:51

## 2022-11-27 RX ADMIN — QUETIAPINE FUMARATE 12.5 MILLIGRAM(S): 200 TABLET, FILM COATED ORAL at 02:51

## 2022-11-27 RX ADMIN — Medication 650 MILLIGRAM(S): at 19:30

## 2022-11-27 RX ADMIN — ENOXAPARIN SODIUM 40 MILLIGRAM(S): 100 INJECTION SUBCUTANEOUS at 17:02

## 2022-11-27 RX ADMIN — POLYETHYLENE GLYCOL 3350 17 GRAM(S): 17 POWDER, FOR SOLUTION ORAL at 04:56

## 2022-11-27 RX ADMIN — Medication 1 MILLIGRAM(S): at 08:52

## 2022-11-27 RX ADMIN — QUETIAPINE FUMARATE 12.5 MILLIGRAM(S): 200 TABLET, FILM COATED ORAL at 17:00

## 2022-11-27 RX ADMIN — LISINOPRIL 40 MILLIGRAM(S): 2.5 TABLET ORAL at 08:52

## 2022-11-27 RX ADMIN — Medication 650 MILLIGRAM(S): at 11:20

## 2022-11-27 NOTE — PROGRESS NOTE ADULT - ASSESSMENT
67F with history of hypertension, dyslipidemia, new onset dementia, alcohol abuse (1/2-1 bottle of wine a day) presents after an unwitnessed fall with trauma to the front of the head on 11/17. The next day the patient was found to be confused. 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 No AC/AP. Patient s/p EVD placement 11/18 (removed 11/23). CTA without evidence of vascular malformation.    Impression: Left cerebellar hemorrhage, intraventricular hemorrhage, hydrocephalus S/P EVD Etiology includes uncontrolled hypertension versus vascular lesion. Appears to have multiple cavernomas on MRI which are likely etiology of ICH    NEURO: Neurologically improved since admission, Continue close monitoring for neurologic deterioration, SBP < 140,  Neurosurgery following for previous EVD. MRI Brain w/o noted above, plan for MRI HEAD with contrast in 4-6 weeks once hemorrhage is reabsorbed to rule out underling malformation.  EEG 11/22: Moderate diffuse/multi-focal cerebral dysfunction, not specific as to etiology.  There were no epileptiform abnormalities recorded x 1 day. Physical therapy/OT: AR    ANTITHROMBOTIC THERAPY: none indicated due to hemorrhage    PULMONARY: CXR clear 11/23, protecting airway, saturating well     CARDIOVASCULAR: TTE: EF >80%, Hyperdynamic left ventricular systolic function. Cardiac monitoring. Increased Lisinopril to 40mg PO daily 11/26 to achieve gradual normotension                              SBP goal: < 140    GASTROINTESTINAL:  dysphagia screen initially failed, now on diet       Diet: Regular    RENAL: BUN/Cr stable, good urine output      Na Goal: Greater than 135     Ann: N    HEMATOLOGY: H/H stable, Platelets 232     DVT ppx: venodynes, will started once CT head stable    ID: febrile on 11/23: so si/sx of infection, no leukocytosis, + UTI started Ceftriaxone 11/26- final dose 11/28, Follow up Urine Culture.    OTHER: history of alcohol abuse (1/2-1 bottle of wine a day) was on CIWA, no signs of withdrawal w Normal Heart Rhythm  Plan of care discussed with patient and family at bedside.     DISPOSITION: AR once stable and workup is complete    CORE MEASURES:        Admission NIHSS:      TPA: [] YES [x] NO      LDL/HDL: 163/64     Depression Screen: 0     Statin Therapy: N     Dysphagia Screen: [] PASS [x] FAIL     Smoking [] YES [x] NO      Afib [] YES [x] NO     Stroke Education [x] YES [] NO    Obtain screening lower extremity venous ultrasound in patients who meet 1 or more of the following criteria as patient is high risk for DVT/PE on admission:   [] History of DVT/PE  []Hypercoagulable states (Factor V Leiden, Cancer, OCP, etc. )  []Prolonged immobility (hemiplegia/hemiparesis/post operative or any other extended immobilization)  [] Transferred from outside facility (Rehab or Long term care)  [] Age </= to 50.

## 2022-11-27 NOTE — PROGRESS NOTE ADULT - SUBJECTIVE AND OBJECTIVE BOX
THE PATIENT WAS SEEN AND EXAMINED BY ME WITH THE HOUSESTAFF AND STROKE TEAM DURING MORNING ROUNDS.   HPI:  66 y/o female with history of hypertension, dyslipidemia on ASA, new onset dementia, alcohol abuse (1/2-1 bottle of wine a day) presents after a fall. Patient unable to provide history. Patient apparently had an unwitnessed fall with trauma to the front of the head on . The next day the patient was found to be confused, at that time the patient was taken to [facility] where she was found to have an ICH and transferred [facility] for further management.   CTH w/cerebellar IPH with IVH in / vent, ventriculomegaly, decreased but patent prepontine cistern. No AC/AP. SBP 110s-140s. Plt 219. GCS 14. ICH score 2. SBP  Patient s/p EVD placement  (removed ) emergently in the ER and transferred to [facility].         SUBJECTIVE: No events overnight.  No new neurologic complaints.      MEDICATIONS  (STANDING):  cefTRIAXone   IVPB 1000 milliGRAM(s) IV Intermittent every 24 hours  coronavirus bivalent (EUA) Booster Vaccine (PFIZER) 0.3 milliLiter(s) IntraMuscular once  enoxaparin Injectable 40 milliGRAM(s) SubCutaneous <User Schedule>  folic acid 1 milliGRAM(s) Oral daily  lisinopril 40 milliGRAM(s) Oral daily  multivitamin/minerals 1 Tablet(s) Oral daily  polyethylene glycol 3350 17 Gram(s) Oral two times a day  QUEtiapine 12.5 milliGRAM(s) Oral at bedtime  senna 2 Tablet(s) Oral at bedtime  thiamine 100 milliGRAM(s) Oral daily    MEDICATIONS  (PRN):  acetaminophen     Tablet .. 650 milliGRAM(s) Oral every 6 hours PRN Mild Pain (1 - 3)  QUEtiapine 12.5 milliGRAM(s) Oral every 6 hours PRN agitation        PHYSICAL EXAM:   Vital Signs Last 24 Hrs  T(C): 37.1 (2022 10:00), Max: 37.3 (2022 20:00)  T(F): 98.7 (2022 10:00), Max: 99.2 (2022 20:00)  HR: 75 (2022 14:00) (61 - 85)  BP: 137/81 (2022 14:00) (99/62 - 144/79)  BP(mean): 96 (2022 14:00) (75 - 101)  RR: 19 (2022 14:00) (11 - 20)  SpO2: 100% (2022 14:00) (94% - 100%)    General: No acute distress  HEENT: EOM intact, visual fields full  Abdomen: Soft, nontender, nondistended   Extremities: No edema    NEUROLOGICAL EXAM:  Mental status: Awake, alert, oriented x3, no aphasia, no neglect, normal memory   Cranial Nerves: No facial asymmetry, no nystagmus, mild dysarthria,  tongue midline  Motor exam: Normal tone, no drift, 5/5 RUE, 5/5 RLE, 5/5 LUE, 5/5 LLE, normal fine finger movements.  Sensation: Intact to light touch   Coordination/ Gait: No dysmetria, AVRIL intact and symmetric bilaterally    LABS:                        13.3   5.49  )-----------( 305      ( 2022 06:01 )             38.2         136  |  99  |  11  ----------------------------<  112<H>  4.0   |  26  |  0.62    Ca    9.6      2022 05:59  Phos  4.1       Mg     2.2         Urinalysis Basic - ( 2022 00:36 )  Color: Yellow / Appearance: Slightly Turbid / S.009 / pH: x  Gluc: x / Ketone: Negative  / Bili: Negative / Urobili: Negative   Blood: x / Protein: Negative / Nitrite: Positive   Leuk Esterase: Large / RBC: 4 /hpf / WBC 25 /HPF   Sq Epi: x / Non Sq Epi: 1 /hpf / Bacteria: Moderate    IMAGING: Reviewed by me.   CT Head No Cont (22 @ 09:12)   Evolving left cerebellar hematoma with surrounding lucency as seen on the   prior and mass effect on the fourth ventricle. No change in appearance of   the hematoma in the left cerebellum as well as trace heme in the right   frontal region along the prior shunt tract.    CT HEAD 22: Acute parenchymal hemorrhage involving the left cerebellar region is   again seen with surrounding edema. This finding measures approximately   2.6 x 2.3 cm and previously measured approximately 2.7 x 2.2 cm. Mass   effect on the fourth ventricle is again seen.    MRI HEAD 22: Left cerebellar hemorrhage with intraventricular extension   and mild hydrocephalus. Right frontal ventricular catheter. Nonspecific   white matter changes likely representing nonacute ischemia. Differential   diagnosis for hemorrhage includes hypertension, multiple cavernomas and   amyloid angiopathy.    CT HEAD 22: Intraventricular hemorrhage and hydrocephalus, unchanged. Left cerebellar   hemorrhage with fourth ventricular extension.    CTA H/N: Normal CTA of the head. No abnormal vascularity or aneurysm.   Mild left carotid calcification without significant stenosis.

## 2022-11-28 DIAGNOSIS — I16.1 HYPERTENSIVE EMERGENCY: ICD-10-CM

## 2022-11-28 DIAGNOSIS — I61.9 NONTRAUMATIC INTRACEREBRAL HEMORRHAGE, UNSPECIFIED: ICD-10-CM

## 2022-11-28 LAB
-  AMIKACIN: SIGNIFICANT CHANGE UP
-  AMOXICILLIN/CLAVULANIC ACID: SIGNIFICANT CHANGE UP
-  AMPICILLIN/SULBACTAM: SIGNIFICANT CHANGE UP
-  AMPICILLIN: SIGNIFICANT CHANGE UP
-  AZTREONAM: SIGNIFICANT CHANGE UP
-  CEFAZOLIN: SIGNIFICANT CHANGE UP
-  CEFEPIME: SIGNIFICANT CHANGE UP
-  CEFOXITIN: SIGNIFICANT CHANGE UP
-  CEFTRIAXONE: SIGNIFICANT CHANGE UP
-  CIPROFLOXACIN: SIGNIFICANT CHANGE UP
-  ERTAPENEM: SIGNIFICANT CHANGE UP
-  GENTAMICIN: SIGNIFICANT CHANGE UP
-  IMIPENEM: SIGNIFICANT CHANGE UP
-  LEVOFLOXACIN: SIGNIFICANT CHANGE UP
-  MEROPENEM: SIGNIFICANT CHANGE UP
-  NITROFURANTOIN: SIGNIFICANT CHANGE UP
-  PIPERACILLIN/TAZOBACTAM: SIGNIFICANT CHANGE UP
-  TOBRAMYCIN: SIGNIFICANT CHANGE UP
-  TRIMETHOPRIM/SULFAMETHOXAZOLE: SIGNIFICANT CHANGE UP
ANION GAP SERPL CALC-SCNC: 12 MMOL/L — SIGNIFICANT CHANGE UP (ref 5–17)
BUN SERPL-MCNC: 10 MG/DL — SIGNIFICANT CHANGE UP (ref 7–23)
CALCIUM SERPL-MCNC: 9.4 MG/DL — SIGNIFICANT CHANGE UP (ref 8.4–10.5)
CHLORIDE SERPL-SCNC: 102 MMOL/L — SIGNIFICANT CHANGE UP (ref 96–108)
CO2 SERPL-SCNC: 24 MMOL/L — SIGNIFICANT CHANGE UP (ref 22–31)
CREAT SERPL-MCNC: 0.58 MG/DL — SIGNIFICANT CHANGE UP (ref 0.5–1.3)
CULTURE RESULTS: SIGNIFICANT CHANGE UP
EGFR: 100 ML/MIN/1.73M2 — SIGNIFICANT CHANGE UP
GLUCOSE SERPL-MCNC: 108 MG/DL — HIGH (ref 70–99)
HCT VFR BLD CALC: 39 % — SIGNIFICANT CHANGE UP (ref 34.5–45)
HGB BLD-MCNC: 13.3 G/DL — SIGNIFICANT CHANGE UP (ref 11.5–15.5)
MCHC RBC-ENTMCNC: 32.3 PG — SIGNIFICANT CHANGE UP (ref 27–34)
MCHC RBC-ENTMCNC: 34.1 GM/DL — SIGNIFICANT CHANGE UP (ref 32–36)
MCV RBC AUTO: 94.7 FL — SIGNIFICANT CHANGE UP (ref 80–100)
METHOD TYPE: SIGNIFICANT CHANGE UP
NRBC # BLD: 0 /100 WBCS — SIGNIFICANT CHANGE UP (ref 0–0)
ORGANISM # SPEC MICROSCOPIC CNT: SIGNIFICANT CHANGE UP
ORGANISM # SPEC MICROSCOPIC CNT: SIGNIFICANT CHANGE UP
PLATELET # BLD AUTO: 361 K/UL — SIGNIFICANT CHANGE UP (ref 150–400)
POTASSIUM SERPL-MCNC: 4.4 MMOL/L — SIGNIFICANT CHANGE UP (ref 3.5–5.3)
POTASSIUM SERPL-SCNC: 4.4 MMOL/L — SIGNIFICANT CHANGE UP (ref 3.5–5.3)
RBC # BLD: 4.12 M/UL — SIGNIFICANT CHANGE UP (ref 3.8–5.2)
RBC # FLD: 11.9 % — SIGNIFICANT CHANGE UP (ref 10.3–14.5)
SODIUM SERPL-SCNC: 138 MMOL/L — SIGNIFICANT CHANGE UP (ref 135–145)
SPECIMEN SOURCE: SIGNIFICANT CHANGE UP
WBC # BLD: 5.48 K/UL — SIGNIFICANT CHANGE UP (ref 3.8–10.5)
WBC # FLD AUTO: 5.48 K/UL — SIGNIFICANT CHANGE UP (ref 3.8–10.5)

## 2022-11-28 PROCEDURE — 99222 1ST HOSP IP/OBS MODERATE 55: CPT

## 2022-11-28 RX ORDER — AMLODIPINE BESYLATE 2.5 MG/1
10 TABLET ORAL DAILY
Refills: 0 | Status: DISCONTINUED | OUTPATIENT
Start: 2022-11-28 | End: 2022-11-29

## 2022-11-28 RX ADMIN — LISINOPRIL 40 MILLIGRAM(S): 2.5 TABLET ORAL at 05:27

## 2022-11-28 RX ADMIN — Medication 650 MILLIGRAM(S): at 20:32

## 2022-11-28 RX ADMIN — Medication 650 MILLIGRAM(S): at 21:15

## 2022-11-28 RX ADMIN — CEFTRIAXONE 100 MILLIGRAM(S): 500 INJECTION, POWDER, FOR SOLUTION INTRAMUSCULAR; INTRAVENOUS at 06:34

## 2022-11-28 RX ADMIN — QUETIAPINE FUMARATE 12.5 MILLIGRAM(S): 200 TABLET, FILM COATED ORAL at 16:41

## 2022-11-28 RX ADMIN — ENOXAPARIN SODIUM 40 MILLIGRAM(S): 100 INJECTION SUBCUTANEOUS at 18:41

## 2022-11-28 RX ADMIN — Medication 1 MILLIGRAM(S): at 12:04

## 2022-11-28 RX ADMIN — Medication 100 MILLIGRAM(S): at 12:04

## 2022-11-28 RX ADMIN — POLYETHYLENE GLYCOL 3350 17 GRAM(S): 17 POWDER, FOR SOLUTION ORAL at 05:28

## 2022-11-28 RX ADMIN — Medication 1 TABLET(S): at 12:04

## 2022-11-28 RX ADMIN — AMLODIPINE BESYLATE 10 MILLIGRAM(S): 2.5 TABLET ORAL at 16:41

## 2022-11-28 NOTE — PROGRESS NOTE ADULT - SUBJECTIVE AND OBJECTIVE BOX
THE PATIENT WAS SEEN AND EXAMINED BY ME WITH THE HOUSESTAFF AND STROKE TEAM DURING MORNING ROUNDS.   HPI:  66 y/o female with history of hypertension, dyslipidemia on ASA, new onset dementia, alcohol abuse (1/2-1 bottle of wine a day) presented after a fall. Patient unable to provide history. Patient apparently had an unwitnessed fall with trauma to the front of the head on 11/17. The next day the patient was found to be confused, at that time the patient was taken to [facility] [place] where she was found to have an ICH and transferred [facility] for further management.   CTH w/cerebellar IPH with IVH in 4th/3rd vent, ventriculomegaly, decreased but patent prepontine cistern. No AC/AP. SBP 110s-140s. Plt 219. GCS 14. ICH score 2. SBP  Patient s/p EVD placement 11/18 (removed 11/23) emergently in the ER and transferred to [facility].  Now transferred to stroke service on 11/25.    SUBJECTIVE: No events overnight.  No new neurologic complaints.  ROS reported negative unless otherwise noted.    acetaminophen     Tablet .. 650 milliGRAM(s) Oral every 6 hours PRN  coronavirus bivalent (EUA) Booster Vaccine (PFIZER) 0.3 milliLiter(s) IntraMuscular once  enoxaparin Injectable 40 milliGRAM(s) SubCutaneous <User Schedule>  folic acid 1 milliGRAM(s) Oral daily  lisinopril 40 milliGRAM(s) Oral daily  multivitamin/minerals 1 Tablet(s) Oral daily  polyethylene glycol 3350 17 Gram(s) Oral two times a day  QUEtiapine 12.5 milliGRAM(s) Oral every 6 hours PRN  QUEtiapine 12.5 milliGRAM(s) Oral at bedtime  senna 2 Tablet(s) Oral at bedtime  thiamine 100 milliGRAM(s) Oral daily    PHYSICAL EXAM:   Vital Signs Last 24 Hrs  T(C): 37 (28 Nov 2022 08:00), Max: 37.4 (27 Nov 2022 20:00)  T(F): 98.6 (28 Nov 2022 08:00), Max: 99.4 (27 Nov 2022 20:00)  HR: 75 (28 Nov 2022 08:00) (60 - 88)  BP: 154/98 (28 Nov 2022 08:00) (121/69 - 159/88)  BP(mean): 114 (28 Nov 2022 08:00) (80 - 114)  RR: 16 (28 Nov 2022 08:00) (15 - 20)  SpO2: 99% (28 Nov 2022 08:00) (94% - 100%)    Parameters below as of 28 Nov 2022 08:00  Patient On (Oxygen Delivery Method): room air      General: No acute distress  HEENT: EOM intact, visual fields full  Abdomen: Soft, nontender, nondistended   Extremities: No edema    NEUROLOGICAL EXAM:  Mental status: Awake, alert, oriented x3, no aphasia, no neglect, normal memory   Cranial Nerves: No facial asymmetry, no nystagmus, mild dysarthria,  tongue midline  Motor exam: Normal tone, no drift, 5/5 RUE, 5/5 RLE, 5/5 LUE, 5/5 LLE, normal fine finger movements.  Sensation: Intact to light touch   Coordination/ Gait: No dysmetria, AVRIL intact and symmetric bilaterally    LABS:                        13.3   5.48  )-----------( 361      ( 28 Nov 2022 05:30 )             39.0    11-28    138  |  102  |  10  ----------------------------<  108<H>  4.4   |  24  |  0.58    Ca    9.4      28 Nov 2022 05:31      IMAGING: Reviewed by me.      IMAGING: Reviewed by me.   CT Head No Cont (11.25.22 @ 09:12)   Evolving left cerebellar hematoma with surrounding lucency as seen on the   prior and mass effect on the fourth ventricle. No change in appearance of   the hematoma in the left cerebellum as well as trace heme in the right   frontal region along the prior shunt tract.    CT HEAD 11/24/22: Acute parenchymal hemorrhage involving the left cerebellar region is   again seen with surrounding edema. This finding measures approximately   2.6 x 2.3 cm and previously measured approximately 2.7 x 2.2 cm. Mass   effect on the fourth ventricle is again seen.    MRI HEAD 11/20/22: Left cerebellar hemorrhage with intraventricular extension   and mild hydrocephalus. Right frontal ventricular catheter. Nonspecific   white matter changes likely representing nonacute ischemia. Differential   diagnosis for hemorrhage includes hypertension, multiple cavernomas and   amyloid angiopathy.    CT HEAD 11/18/22: Intraventricular hemorrhage and hydrocephalus, unchanged. Left cerebellar   hemorrhage with fourth ventricular extension.    CTA H/N: Normal CTA of the head. No abnormal vascularity or aneurysm.   Mild left carotid calcification without significant stenosis.

## 2022-11-28 NOTE — CONSULT NOTE ADULT - ASSESSMENT
67F with history of hypertension, dyslipidemia, new onset dementia, alcohol abuse (1/2-1 bottle of wine a day) presents after an unwitnessed fall with trauma to the front of the head on 11/17. The next day the patient was found to be confused. 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 No AC/AP. Patient s/p EVD placement 11/18 (removed 11/23). CTA without evidence of vascular malformation.    Impression: Left cerebellar hemorrhage, intraventricular hemorrhage, hydrocephalus S/P EVD Etiology likely uncontrolled hypertension

## 2022-11-28 NOTE — CONSULT NOTE ADULT - PROBLEM SELECTOR RECOMMENDATION 9
SBP goal < 140   Add Norvasc 10 mg daily   cont with lisinopril 40 mg daily   Low salt diet counseling

## 2022-11-28 NOTE — CONSULT NOTE ADULT - PROBLEM SELECTOR RECOMMENDATION 2
Likely hypertensive   SBP goal as above   Neuro checks per Stroke unit   PT eval   aspiration precautions

## 2022-11-28 NOTE — CONSULT NOTE ADULT - SUBJECTIVE AND OBJECTIVE BOX
HPI:  66 y/o female with history of hypertension, dyslipidemia on ASA, new onset dementia, alcohol abuse (1/2-1 bottle of wine a day) presents after a fall. Patient unable to provide history. Patient apparently had an unwitnessed fall with trauma to the front of the head on 11/17. The next day the patient was found to be confused, at that time the patient was taken to [facility] where she was found to have an ICH and transferred [facility] for further management.   CTH w/cerebellar IPH with IVH in 4th/3rd vent, ventriculomegaly, decreased but patent prepontine cistern. No AC/AP. SBP 110s-140s. Plt 219. GCS 14. ICH score 2. SBP  Patient s/p EVD placement 11/18 (removed 11/22) emergently in the ER and transferred to [facility].     REVIEW OF SYSTEMS  Unable     A 10-system ROS was performed and is negative except for those items noted above and/or in the HPI.    PAST MEDICAL & SURGICAL HISTORY:    FAMILY HISTORY:    SOCIAL HISTORY:   T/E/D:   Occupation:   Lives with:     MEDICATIONS (HOME):  Home Medications:    MEDICATIONS  (STANDING):  chlorhexidine 4% Liquid 1 Application(s) Topical <User Schedule>  enoxaparin Injectable 40 milliGRAM(s) SubCutaneous <User Schedule>  folic acid 1 milliGRAM(s) Oral daily  lisinopril 30 milliGRAM(s) Oral <User Schedule>  multivitamin/minerals 1 Tablet(s) Oral daily  polyethylene glycol 3350 17 Gram(s) Oral two times a day  saline laxative (FLEET) Rectal Enema 1 Enema Rectal once  senna 2 Tablet(s) Oral at bedtime  thiamine 100 milliGRAM(s) Oral daily    MEDICATIONS  (PRN):  QUEtiapine 12.5 milliGRAM(s) Oral every 6 hours PRN agitation    ALLERGIES/INTOLERANCES:  Allergies  No Known Allergies    Intolerances    VITALS & EXAMINATION:  Vital Signs Last 24 Hrs  T(C): 37 (23 Nov 2022 11:00), Max: 37.5 (22 Nov 2022 15:00)  T(F): 98.6 (23 Nov 2022 11:00), Max: 99.5 (22 Nov 2022 15:00)  HR: 77 (23 Nov 2022 11:00) (51 - 77)  BP: 165/85 (23 Nov 2022 11:00) (120/66 - 207/88)  BP(mean): 109 (23 Nov 2022 11:00) (80 - 122)  RR: 18 (23 Nov 2022 11:00) (12 - 23)  SpO2: 99% (23 Nov 2022 11:00) (97% - 100%)    Parameters below as of 22 Nov 2022 19:00  Patient On (Oxygen Delivery Method): room air      General:  Constitutional: Female, appears stated age, in no apparent distress including pain  Head: Normocephalic & atraumatic.  Respiratory: No increased work of breathing  Extremities: No cyanosis, clubbing, or edema.    Neurological (>12):  MS: Awake, alert, agitated, not cooperative with interview or exam. Follows some commands.    Language: Speech is clear, fluent, speaking full sentences, "leave me alone", "no"    CNs: VFF. Downward right gaze, unclear if pt able to cross midline as pt is not cooperating.   No facial asymmetry b/l at rest. Hearing grossly normal to speech. Rest of exam limited by pt cooperation    Motor: B/l UE no drift in 10 seconds. RLE no drift in 5 seconds. LLE slight drift in 5 seconds. No noticeable tremor or seizure.     Sensation: intact to noxious stimuli in all extemities    Reflexes: deferred    Coordination and gait: unable to assess due to pt cooperation and pt safety    LABORATORY:  CBC                       13.1   8.83  )-----------( 191      ( 21 Nov 2022 22:41 )             36.3     Chem 11-21    135  |  99  |  8   ----------------------------<  99  3.5   |  23  |  0.42<L>    Ca    8.7      21 Nov 2022 22:41  Phos  2.4     11-21  Mg     2.0     11-21    Lipid Panel 11-19 Chol 250<H> LDL -- HDL 64 Trig 118    STUDIES & IMAGING:  Studies (EKG, EEG, EMG, etc):     Radiology (XR, CT, MR, U/S, TTE/LOBO):    < from: CT Head No Cont (11.18.22 @ 21:42) >  IMPRESSION: Right frontal ventricular catheter new since 4:03 PM.   Intraventricular hemorrhage and hydrocephalus, unchanged. Left cerebellar   hemorrhage with fourth ventricular extension.    < from: CT Angio Head w/ IV Cont (11.18.22 @ 21:44) >  IMPRESSION: Normal CTA of the head. No abnormal vascularity or aneurysm.   Mild left carotid calcification without significant stenosis.    < from: CT Head No Cont (11.23.22 @ 07:49) >  IMPRESSION:  No change in ventricle size status post EVD clamping.   Residual intraventricular hemorrhage. Residual hemorrhage in the left   cerebellar hemisphere with intraventricular extension as seen on the prior    < from: MR Head w/wo IV Cont (11.20.22 @ 12:53) >  IMPRESSION: Left cerebellar hemorrhage with intraventricular extension   and mild hydrocephalus. Right frontal ventricular catheter. Nonspecific   white matter changes likely representing nonacute ischemia. Differential   diagnosis for hemorrhage includes hypertension, multiple cavernomas and   amyloid angiopathy.    11/22 EEG Classification / Summary:  Abnormal EEG study  Moderate generalized background slowing    -----------------------------------------------------------------------------------------------------  Clinical Impression:  Moderate diffuse/multi-focal cerebral dysfunction, not specific as to etiology.  There were no epileptiform abnormalities recorded x 1 day.  
HPI:  67F Hx HTN HLD, presented to  w/dizziness & AMS since AM of 11/17. Patient reportedly early dementia (AOx3 bl). No AC/AP. CTH w/cerebellar IPH with IVH in 4th/3rd vent, ventriculomegaly, decreased but patent prepontine cistern. Plt 219   (18 Nov 2022 19:00)    Patient was admitted on 11/18, CTH with cerebellar IPH, patient's course with agitation, seen today, on 4 gomez, family at bedside. They report patient would ask questions over and over, feel memory is worse today, never treated for dementia. Patient denies weakness, pain, headache.     REVIEW OF SYSTEMS  Constitutional - No fever, No weight loss, No fatigue  HEENT - No eye pain, No visual disturbances, No difficulty hearing, No tinnitus, No vertigo, No neck pain  Respiratory - No cough, No wheezing, No shortness of breath  Cardiovascular - No chest pain, No palpitations  Gastrointestinal - No abdominal pain, No nausea, No vomiting, No diarrhea, No constipation  Genitourinary - No dysuria, No frequency, No hematuria, No incontinence  Psychiatric - No depression, No anxiety    VITALS  T(C): 37.2 (11-28-22 @ 12:00), Max: 37.4 (11-27-22 @ 20:00)  HR: 100 (11-28-22 @ 12:00) (60 - 100)  BP: 145/82 (11-28-22 @ 12:00) (121/69 - 170/92)  RR: 14 (11-28-22 @ 12:00) (14 - 20)  SpO2: 100% (11-28-22 @ 12:00) (94% - 100%)  Wt(kg): --    PAST MEDICAL & SURGICAL HISTORY  see HPI     SOCIAL HISTORY  Smoking - Denied  EtOH - Denied   Drugs - Denied    FUNCTIONAL HISTORY  Lives with family,  retired, 10 steps to enter  Independent AMB and ADLs PTA     CURRENT FUNCTIONAL STATUS  11/25 PT  bed mobility min assist  functional mobility mod assist     11/28 OT  bed mobility contact guard  transfers min assist   confused    FAMILY HISTORY   no pertinent history in first degree relatives     RECENT LABS/IMAGING  CBC Full  -  ( 28 Nov 2022 05:30 )  WBC Count : 5.48 K/uL  RBC Count : 4.12 M/uL  Hemoglobin : 13.3 g/dL  Hematocrit : 39.0 %  Platelet Count - Automated : 361 K/uL  Mean Cell Volume : 94.7 fl  Mean Cell Hemoglobin : 32.3 pg  Mean Cell Hemoglobin Concentration : 34.1 gm/dL  Auto Neutrophil # : x  Auto Lymphocyte # : x  Auto Monocyte # : x  Auto Eosinophil # : x  Auto Basophil # : x  Auto Neutrophil % : x  Auto Lymphocyte % : x  Auto Monocyte % : x  Auto Eosinophil % : x  Auto Basophil % : x    11-28    138  |  102  |  10  ----------------------------<  108<H>  4.4   |  24  |  0.58    Ca    9.4      28 Nov 2022 05:31    < from: CT Head No Cont (11.25.22 @ 09:12) >    IMPRESSION:  Evolving left cerebellar hematoma with surrounding lucency as seen on the   prior and mass effect on thefourth ventricle. No change in appearance of   the hematoma in the left cerebellum as well as trace heme in the right   frontal region along the prior shunt tract.    < end of copied text >          ALLERGIES  No Known Allergies      MEDICATIONS   acetaminophen     Tablet .. 650 milliGRAM(s) Oral every 6 hours PRN  amLODIPine   Tablet 10 milliGRAM(s) Oral daily  coronavirus bivalent (EUA) Booster Vaccine (PFIZER) 0.3 milliLiter(s) IntraMuscular once  enoxaparin Injectable 40 milliGRAM(s) SubCutaneous <User Schedule>  folic acid 1 milliGRAM(s) Oral daily  lisinopril 40 milliGRAM(s) Oral daily  multivitamin/minerals 1 Tablet(s) Oral daily  polyethylene glycol 3350 17 Gram(s) Oral two times a day  QUEtiapine 12.5 milliGRAM(s) Oral every 6 hours PRN  QUEtiapine 12.5 milliGRAM(s) Oral at bedtime  senna 2 Tablet(s) Oral at bedtime  thiamine 100 milliGRAM(s) Oral daily      ----------------------------------------------------------------------------------------  PHYSICAL EXAM  Constitutional - NAD, Comfortable, in bed   Chest - Breathing comfortably  Cardiovascular - S1S2   Abdomen - Soft   Extremities - No C/C/E, No calf tenderness   Neurologic Exam -                    Cognitive - Awake, Alert, AAO to self, place: home, month June, year 2020, not situation     Communication - Fluent, No dysarthria        Motor - No focal deficits                    LEFT    UE - ShAB 5/5, EF 5/5, EE 5/5, WE 5/5,  5/5                    RIGHT UE - ShAB 5/5, EF 5/5, EE 5/5, WE 5/5,  5/5                    LEFT    LE - HF 5/5, KE 5/5, DF 5/5, PF 5/5                    RIGHT LE - HF 5/5, KE 5/5, DF 5/5, PF 5/5        Sensory - Intact to LT     Psychiatric - Mood stable, Affect WNL  ----------------------------------------------------------------------------------------  ASSESSMENT/PLAN  67yFemale h/o ETOH, dementia with functional deficits after cerebellar hemorrhage   UTI, ceftriaxone through 11/28, awaiting ID consult   agitation seroquel   Pain - Tylenol  DVT PPX - SCDs, lovenox   Rehab - Will continue to follow for ongoing rehab needs and recommendations.    Recommend ACUTE inpatient rehabilitation for the functional deficits consisting of 3 hours of therapy/day & 24 hour RN/daily PMR physician for comorbid medical management. Patient will be able to tolerate 3 hours a day.   
CHIEF COMPLAINT:    HISTORY OF PRESENT ILLNESS:  68 y/o female with history of hypertension, dyslipidemia on ASA, new onset dementia, alcohol abuse (1/2-1 bottle of wine a day) presented after a fall. Patient unable to provide history. Patient apparently had an unwitnessed fall with trauma to the front of the head on 11/17. The next day the patient was found to be confused, at that time the patient was taken to [facility] where she was found to have an ICH and transferred [facility] for further management.   CTH w/cerebellar IPH with IVH in 4th/3rd vent, ventriculomegaly, decreased but patent prepontine cistern. No AC/AP. SBP 110s-140s. Plt 219. GCS 14. ICH score 2. SBP  Patient s/p EVD placement 11/18 (removed 11/23) emergently in the ER and transferred to [facility].  Now transferred to stroke service on 11/25.    PAST MEDICAL & SURGICAL HISTORY:    HTN   HPL       MEDICATIONS:  enoxaparin Injectable 40 milliGRAM(s) SubCutaneous <User Schedule>  lisinopril 40 milliGRAM(s) Oral daily  acetaminophen     Tablet .. 650 milliGRAM(s) Oral every 6 hours PRN  QUEtiapine 12.5 milliGRAM(s) Oral every 6 hours PRN  QUEtiapine 12.5 milliGRAM(s) Oral at bedtime  polyethylene glycol 3350 17 Gram(s) Oral two times a day  senna 2 Tablet(s) Oral at bedtime  coronavirus bivalent (EUA) Booster Vaccine (PFIZER) 0.3 milliLiter(s) IntraMuscular once  folic acid 1 milliGRAM(s) Oral daily  multivitamin/minerals 1 Tablet(s) Oral daily  thiamine 100 milliGRAM(s) Oral daily      FAMILY HISTORY:      SOCIAL HISTORY:    [ ] Non-smoker  [ ] Smoker  [ ] Alcohol    Allergies    No Known Allergies    Intolerances    	    REVIEW OF SYSTEMS:  CONSTITUTIONAL: No fever, weight loss, + fatigue  EYES: No eye pain, visual disturbances, or discharge  ENMT:  No difficulty hearing, tinnitus, vertigo; No sinus or throat pain  NECK: No pain or stiffness  RESPIRATORY: No cough, wheezing, chills or hemoptysis; No Shortness of Breath  CARDIOVASCULAR: No chest pain, palpitations, passing out, dizziness, or leg swelling  GASTROINTESTINAL: No abdominal or epigastric pain. No nausea, vomiting, or hematemesis; No diarrhea or constipation. No melena or hematochezia.  GENITOURINARY: No dysuria, frequency, hematuria, or incontinence  NEUROLOGICAL: No headaches, memory loss, loss of strength, numbness, or tremors  SKIN: No itching, burning, rashes, or lesions   LYMPH Nodes: No enlarged glands  ENDOCRINE: No heat or cold intolerance; No hair loss  MUSCULOSKELETAL: No joint pain or swelling; No muscle, back, or extremity pain  PSYCHIATRIC: No depression, anxiety, mood swings, or difficulty sleeping  HEME/LYMPH: No easy bruising, or bleeding gums  ALLERY AND IMMUNOLOGIC: No hives or eczema	    [ ] All others negative	  [ ] Unable to obtain    PHYSICAL EXAM:  T(C): 37 (11-28-22 @ 08:00), Max: 37.4 (11-27-22 @ 20:00)  HR: 75 (11-28-22 @ 08:00) (60 - 88)  BP: 154/98 (11-28-22 @ 08:00) (121/69 - 159/88)  RR: 16 (11-28-22 @ 08:00) (15 - 20)  SpO2: 99% (11-28-22 @ 08:00) (94% - 100%)  Wt(kg): --  I&O's Summary    27 Nov 2022 07:01  -  28 Nov 2022 07:00  --------------------------------------------------------  IN: 0 mL / OUT: 1650 mL / NET: -1650 mL        Appearance: NAD  HEENT:   Dry   oral mucosa, PERRL, EOMI	  Lymphatic: No lymphadenopathy  Cardiovascular: Normal S1 S2, No JVD, No murmurs, No edema  Respiratory: Lungs clear to auscultation	  Psychiatry: A & O x 3, Mood & affect appropriate  Gastrointestinal:  Soft, Non-tender, + BS	  Skin: No rashes, No ecchymoses, No cyanosis	  Extremities: Normal range of motion, No clubbing, cyanosis or edema  Vascular: Peripheral pulses palpable 2+ bilaterally  NEUROLOGICAL EXAM:  Mental status: Awake, alert, oriented x3, no aphasia, no neglect, normal memory   Cranial Nerves: No facial asymmetry, no nystagmus, mild dysarthria,  tongue midline  Motor exam: Normal tone, no drift, 5/5 RUE, 5/5 RLE, 5/5 LUE, 5/5 LLE, normal fine finger movements.  Sensation: Intact to light touch   Coordination/ Gait: No dysmetria, AVRIL intact and symmetric bilaterally  TELEMETRY: 	SR     ECG:  	NSR non specific stt changes     < from: Transthoracic Echocardiogram (11.20.22 @ 14:49) >  Patient name: MCKENZIE MORELAND  YOB: 1955   Age: 67 (F)   MR#: [id number]  Study Date: 11/20/2022  [facility] Chap[facility]on [facility]  Study quality: Technically fair  Referring Physician: Jens Vines MD  BloodPressure: 143/64 mmHg  Height: 157 cm  Weight: 53 kg  BSA: 1.5 m2  ------------------------------------------------------------------------  PROCEDURE: Transthoracic echocardiogram with 2-D, M-Mode  and complete spectral and color flow Doppler.  INDICATION: Dyspnea, unspecified (R06.00)  ------------------------------------------------------------------------  Dimensions:    Normal Values:  LA:     2.5    2.0 - 4.0 cm  Ao:     3.0    2.0 - 3.8 cm  SEPTUM: 0.7    0.6 - 1.2 cm  PWT:    0.7    0.6 -1.1 cm  LVIDd:  4.1    3.0 - 5.6 cm  LVIDs:  2.7    1.8 - 4.0 cm  Derived variables:  LVMI: 54 g/m2  RWT: 0.34  Fractional short: 34 %  EF (Visual Estimate): >80 %  Doppler Peak Velocity (m/sec): AoV=1.8  ------------------------------------------------------------------------  Observations:  Mitral Valve: Normal mitral valve. Minimal mitral  regurgitation.  Aortic Valve/Aorta: Aortic valve not well visualized;  appears to be a calcified trileaflet valve with normal  opening. Peak transaortic valve gradient equals 3 mm Hg. No  aortic valve regurgitation seen. Peak left ventricular  outflow tract gradient equals 10 mm Hg, mean gradient is  equal to 6 mm Hg, LVOT velocity time integral equals 22 cm.  Aortic Root: 3 cm.  LVOT diameter: 1.9 cm.  Left Atrium: Normal left atrium.  LA volume index = 22  cc/m2. Mobile interatrial septum.  Left Ventricle: Hyperdynamic left ventricular systolic  function. Measured intracavitary gradient of approximately  22 mmHg seen at rest. Intracavitary gradientmay be  underesimated on this study. Normal left ventricular  internal dimensions and wall thicknesses. Normal diastolic  function.  Right Heart: Normal right atrium. Normal right ventricular  size and function. Tricuspid valve not well visualized,  probably normal. Minimal tricuspid regurgitation. Normal  pulmonic valve.  Pericardium/Pleura: Normal pericardium with no pericardial  effusion.  Hemodynamic: Estimated right atrial pressure equals 3 mmHg.  Inadequate tricuspid regurgitation Doppler envelope  precludes estimation of RVSP.  ------------------------------------------------------------------------  Conclusions:  1. Normal mitral valve. Minimal mitral regurgitation.  2. Aortic valve not well visualized; appears to be a  calcified trileaflet valve with normal opening. No aortic  valve regurgitation seen.  3. Hyperdynamic left ventricular systolic function.  Measured intracavitary gradient of approximately 22 mmHg  seen at rest. Intracavitary gradient may be underesimated  on this study.  4. Normal right ventricular size and function.  *** No previous Echo exam.  ------------------------------------------------------------------------  Confirmed on  11/20/2022 - 17:07:08 by Sarai Phan,  RADIOLOGY:  < from: CT Head No Cont (11.25.22 @ 09:12) >    ACC: [id number] EXAM:  CT BRAIN                          PROCEDURE DATE:  11/25/2022      INTERPRETATION:  INDICATIONS:  Follow-up shunt tract hemorrhage    TECHNIQUE:  Serial axial images were obtained from the skull base to the   vertex withoutintravenous contrast using portable technique.    COMPARISON EXAMINATION: 11/24/2022    FINDINGS:  VENTRICLES AND SULCI:  Age-appropriate involutional changes. Minimal   residual ventricular dilatation as seen on the prior  INTRA-AXIAL: Trace hemorrhage along the right frontal shunt tract   identified. Again seen is hemorrhage at the level of the left cerebellum   is with mass effect on the fourth ventricle and surrounding edema is   relatively unchanged compared with the prior  EXTRA-AXIAL:  No mass or collection is seen.  VISUALIZED SINUSES:  Clear.  VISUALIZED MASTOIDS:  Clear.  CALVARIUM:  Normal.  MISCELLANEOUS:  None.    IMPRESSION:  Evolving left cerebellar hematoma with surrounding lucency as seen on the   prior and mass effect on thefourth ventricle. No change in appearance of   the hematoma in the left cerebellum as well as trace heme in the right   frontal region along the prior shunt tract.    < from: CT Head No Cont (11.24.22 @ 09:35) >  ACC: [id number] EXAM:  CT BRAIN                          PROCEDURE DATE:  11/24/2022    INTERPRETATION:  Clinical indication: Status post EVD removal.    Multiple axial sections were performed from base skull to vertex without   contrast enhancement.    This exam is compared with prior head CT performed on November 23, 2022.    High right frontal craniotomy is again seen though previously noted right   frontal shunt catheter has been removed. There is small amount of   hemorrhage seen alongthe shunt catheter tract with some surrounding   edema. The overall size and configuration of ventricles appear unchanged.   Previously noted intraventricular hemorrhage is slightly less conspicuous   which compatible with expected evolutionary changes.    Acute parenchymal hemorrhage involving the left cerebellar region is   again seen with surrounding edema. This finding measures approximately   2.6 x 2.3 cm and previously measured approximately 2.7 x 2.2 cm. Mass   effect on the fourth ventricle is again seen.    Evaluation of the osseous structures with the appropriate window appears   normal    Extracalvarial soft tissue swelling hematoma and staples seen involving   the high right frontal region.    IMPRESSION: Previously noted right frontal shunt catheter has been   removed.      < end of copied text >  < from: MR Head w/wo IV Cont (11.20.22 @ 12:53) >  ACC: [id number] EXAM:  MR BRAIN WAW IC                          PROCEDURE DATE:  11/20/2022    INTERPRETATION:  CLINICAL INDICATION: Left cerebellar hemorrhage with   intraventricular extension      Magnetic resonance imaging of the brain was carried out with transaxial   SPGR, FLAIR, fast spin echo T2 weighted images, axial susceptibility   weighted series, diffusion weighted series and sagittal T1 weighted   series on a 1.5 Pauline magnet. Post contrast axial, coronal and sagittal   T1 weighted images were obtained. 5 cc of Gadavist were intravenously   injected, 2.5 cc were discarded.    Comparison is made with the prior CT/CTA of 11/18/2022 and brain CT   11/20/2022.  There is hemorrhage in the left dentate nucleus with signal   characteristics of acute early subacute hemorrhage which is bright signal   intensity on T1-weighted images. There is intraventricular extension to   the fourth ventricle and hemorrhage in the third and lateral ventricles.   The lateral ventricles are mildly dilated with a ventricular catheter in   place. A focus of additional old microhemorrhage is identified in the   right cerebellum. Differential diagnosis includes hypertension, multiple   cavernoma is and amyloid angiopathy. No acute infarctsare seen. Moderate   periventricular white matter and brainstem ischemic changes are noted.    After contrast administration is normal intracranial vascular   enhancement. No abnormal parenchymal or leptomeningeal enhancement is   identified.    The sellar and parasellar structures are unremarkable.    IMPRESSION: Left cerebellar hemorrhage with intraventricular extension   and mild hydrocephalus. Right frontal ventricular catheter. Nonspecific   white matter changes likely representing nonacute ischemia. Differential   diagnosis for hemorrhage includes hypertension, multiple cavernomas and   amyloid angiopathy.    --- End of Report ---    < end of copied text >    OTHER: 	  	  LABS:	 	    CARDIAC MARKERS:                        13.3   5.48  )-----------( 361      ( 28 Nov 2022 05:30 )             39.0     11-28    138  |  102  |  10  ----------------------------<  108<H>  4.4   |  24  |  0.58    Ca    9.4      28 Nov 2022 05:31      proBNP:   Lipid Profile:   HgA1c:   TSH:       A1C with Estimated Average Glucose Result: 5.6: Method: Immunoassay     
HPI:   Patient is a 67y female with history of etoh use, had unwitnessed fall with subsequent confusion so admitted 10 d ago. She was found to have left cerebellar hemorrhage and hydrocephalus.Per notes cavernoma is suspected cause of the bleed.  She required evd which was removed on 11/23. She had a ua and urine cx sent a few days ago and had mild pyuria and grew morganella from the urine. She finished 3 d of ctx for a cystitis. Organism was sensitive to ctx. As bet I can tell no fever for several days, had brief temp on 11/23 and had neg bc that day. . She cannot give me a coherent history or ros.     REVIEW OF SYSTEMS:  All other review of systems negative (Comprehensive ROS)    PAST MEDICAL & SURGICAL HISTORY:      Allergies    No Known Allergies    Intolerances        Antimicrobials Day #  :    Other Medications:  acetaminophen     Tablet .. 650 milliGRAM(s) Oral every 6 hours PRN  amLODIPine   Tablet 10 milliGRAM(s) Oral daily  coronavirus bivalent (EUA) Booster Vaccine (PFIZER) 0.3 milliLiter(s) IntraMuscular once  enoxaparin Injectable 40 milliGRAM(s) SubCutaneous <User Schedule>  folic acid 1 milliGRAM(s) Oral daily  lisinopril 40 milliGRAM(s) Oral daily  multivitamin/minerals 1 Tablet(s) Oral daily  polyethylene glycol 3350 17 Gram(s) Oral two times a day  QUEtiapine 12.5 milliGRAM(s) Oral every 6 hours PRN  QUEtiapine 12.5 milliGRAM(s) Oral at bedtime  senna 2 Tablet(s) Oral at bedtime  thiamine 100 milliGRAM(s) Oral daily      FAMILY HISTORY:      SOCIAL HISTORY:  Smoking: [ ]Yes [x ]No  ETOH: [ x]Yes [ ]No  Drug Use: [ ]Yes [ x]No   [ x] Single[ ]    T(F): 98.7 (11-28-22 @ 16:00), Max: 98.9 (11-28-22 @ 00:00)  HR: 86 (11-28-22 @ 18:00)  BP: 138/77 (11-28-22 @ 18:00)  RR: 15 (11-28-22 @ 18:00)  SpO2: 100% (11-28-22 @ 18:00)  Wt(kg): --    PHYSICAL EXAM:  General: awake, confused  no acute distress  Eyes:  anicteric, no conjunctival injection, no discharge  Oropharynx: no lesions or injection 	  Neck: supple, without adenopathy  Lungs: clear to auscultation  Heart: regular rate and rhythm; no murmur, rubs or gallops  Abdomen: soft, nondistended, nontender, without mass or organomegaly  Skin: no lesions  Extremities: no clubbing, cyanosis, or edema  Neurologic: awake and confused  moves all extremities    LAB RESULTS:                        13.3   5.48  )-----------( 361      ( 28 Nov 2022 05:30 )             39.0     11-28    138  |  102  |  10  ----------------------------<  108<H>  4.4   |  24  |  0.58    Ca    9.4      28 Nov 2022 05:31            MICROBIOLOGY:  RECENT CULTURES:  11-26 @ 06:31 Clean Catch Clean Catch (Midstream) Morganella morganii    >100,000 CFU/ml Morganella morganii  <10,000 CFU/ml Normal Urogenital cristiana present            RADIOLOGY REVIEWED:    < from: MR Head w/wo IV Cont (11.20.22 @ 12:53) >    ACC: [id number] EXAM:  MR BRAIN WAW IC                          PROCEDURE DATE:  11/20/2022          INTERPRETATION:  CLINICAL INDICATION: Left cerebellar hemorrhage with   intraventricular extension      Magnetic resonance imaging of the brain was carried out with transaxial   SPGR, FLAIR, fast spin echo T2 weighted images, axial susceptibility   weighted series, diffusion weighted series and sagittal T1 weighted   series on a 1.5 Pauline magnet. Post contrast axial, coronal and sagittal   T1 weighted images were obtained. 5 cc of Gadavist were intravenously   injected, 2.5 cc were discarded.    Comparison is made with the prior CT/CTA of 11/18/2022 and brain CT   11/20/2022.        There is hemorrhage in the left dentate nucleus with signal   characteristics of acute early subacute hemorrhage which is bright signal   intensity on T1-weighted images. There is intraventricular extension to   the fourth ventricle and hemorrhage in the third and lateral ventricles.   The lateral ventricles are mildly dilated with a ventricular catheter in   place. A focus of additional old microhemorrhage is identified in the   right cerebellum. Differential diagnosis includes hypertension, multiple   cavernoma is and amyloid angiopathy. No acute infarctsare seen. Moderate   periventricular white matter and brainstem ischemic changes are noted.    After contrast administration is normal intracranial vascular   enhancement. No abnormal parenchymal or leptomeningeal enhancement is   identified.    The sellar and parasellar structures are unremarkable.          IMPRESSION: Left cerebellar hemorrhage with intraventricular extension   and mild hydrocephalus. Right frontal ventricular catheter. Nonspecific   white matter changes likely representing nonacute ischemia. Differential   diagnosis for hemorrhage includes hypertension, multiple cavernomas and   amyloid angiopathy.    --- End of Report ---            < end of copied text >        Impression:  67 year old woman admitted for fall, confused, found to have cerebella bleed, required evd 11/18-23, had a transient fever on 11/23 with neg blood cx. HAd ua on 11/26 with some pyuria adn urien grew morganella so now has finished 3 d of ceftriaxone which is enough for what appears at most to be cystitis. Patient is too confused to give me any coherent information  Recommendations:  monitor off further antibiotics

## 2022-11-28 NOTE — PROGRESS NOTE ADULT - ASSESSMENT
67F with history of hypertension, dyslipidemia, new onset dementia, alcohol abuse (1/2-1 bottle of wine a day) presents after an unwitnessed fall with trauma to the front of the head on 11/17. The next day the patient was found to be confused. 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 No AC/AP. Patient s/p EVD placement 11/18 (removed 11/23). CTA without evidence of vascular malformation.    Impression: Left cerebellar hemorrhage, intraventricular hemorrhage, hydrocephalus S/P EVD Etiology includes uncontrolled hypertension versus vascular lesion. Appears to have multiple cavernomas on MRI which are likely etiology of ICH    NEURO: Neurologically improved since admission, Continue close monitoring for neurologic deterioration in setting of cerebral edema with mass effect and brain compression, SBP goal < 140,  Neurosurgery following s/p EVD removal on 11/23. MRI Brain w/o noted above, plan for MRI HEAD with contrast in 4-6 weeks (12/16-12/30/2022) once hemorrhage is reabsorbed to rule out underling malformation.  EEG 11/22: Moderate diffuse/multi-focal cerebral dysfunction, not specific as to etiology. There were no epileptiform abnormalities recorded x 1 day. Physical therapy/OT: AR    ANTITHROMBOTIC THERAPY: none indicated due to hemorrhage    PULMONARY: CXR clear 11/23, protecting airway, saturating well     CARDIOVASCULAR: TTE: EF >80%, Hyperdynamic left ventricular systolic function. Cardiac monitoring. continue Lisinopril 40mg PO daily                             SBP goal: < 140    GASTROINTESTINAL:  dysphagia screen initially failed, now on diet       Diet: Regular    RENAL: BUN/Cr without change, good urine output      Na Goal: Greater than 135     Ann: N    HEMATOLOGY: H/H stable, Platelets 361     DVT ppx: venodynes/lovenox subq started on 11/25 after repeat CTH     ID: febrile on 11/23: so si/sx of infection, no leukocytosis, + UTI started Ceftriaxone 11/26- final dose 11/28, UCx shows morganella morganii, sensitives pending    OTHER: history of alcohol abuse (1/2-1 bottle of wine a day) was on CIWA, no signs of withdrawal w Normal Heart Rhythm  Plan of care discussed with patient and family at bedside.     DISPOSITION: AR once bed available.     CORE MEASURES:        Admission NIHSS:      TPA: [] YES [x] NO      LDL/HDL: 163/64     Depression Screen: 0     Statin Therapy: N     Dysphagia Screen: [] PASS [x] FAIL     Smoking [] YES [x] NO      Afib [] YES [x] NO     Stroke Education [x] YES [] NO    Obtain screening lower extremity venous ultrasound in patients who meet 1 or more of the following criteria as patient is high risk for DVT/PE on admission:   [] History of DVT/PE  []Hypercoagulable states (Factor V Leiden, Cancer, OCP, etc. )  []Prolonged immobility (hemiplegia/hemiparesis/post operative or any other extended immobilization)  [] Transferred from outside facility (Rehab or Long term care)  [] Age </= to 50.   67F with history of hypertension, dyslipidemia, new onset dementia, alcohol abuse (1/2-1 bottle of wine a day) presents after an unwitnessed fall with trauma to the front of the head on 11/17. The next day the patient was found to be confused. 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 No AC/AP. Patient s/p EVD placement 11/18 (removed 11/23). CTA without evidence of vascular malformation.    Impression: Left cerebellar hemorrhage, intraventricular hemorrhage, hydrocephalus S/P EVD Etiology includes uncontrolled hypertension versus vascular lesion. Appears to have multiple cavernomas on MRI which are likely etiology of ICH    NEURO: Neurologically improved since admission, Continue close monitoring for neurologic deterioration in setting of cerebral edema with mass effect and brain compression, SBP goal < 140,  Neurosurgery following s/p EVD removal on 11/23. MRI Brain w/o noted above, plan for MRI HEAD with contrast in 4-6 weeks (12/16-12/30/2022) once hemorrhage is reabsorbed to rule out underling malformation.  EEG 11/22: Moderate diffuse/multi-focal cerebral dysfunction, not specific as to etiology. There were no epileptiform abnormalities recorded x 1 day. Physical therapy/OT: AR    ANTITHROMBOTIC THERAPY: none indicated due to hemorrhage    PULMONARY: CXR clear 11/23, protecting airway, saturating well     CARDIOVASCULAR: TTE: EF >80%, Hyperdynamic left ventricular systolic function. Cardiac monitoring. continue Lisinopril 40mg PO daily                             SBP goal: < 140    GASTROINTESTINAL:  dysphagia screen initially failed, now on diet       Diet: Regular    RENAL: BUN/Cr without change, good urine output      Na Goal: Greater than 135     Ann: N    HEMATOLOGY: H/H stable, Platelets 361     DVT ppx: venodynes/lovenox subq started on 11/25 after repeat CTH     ID: febrile on 11/23: so si/sx of infection, no leukocytosis, + UTI started Ceftriaxone 11/26- final dose 11/28, UCx shows morganella morganii, sensitives pending. ID consulted.     OTHER: history of alcohol abuse (1/2-1 bottle of wine a day) was on CIWA, no signs of withdrawal w Normal Heart Rhythm  Plan of care discussed with patient and family at bedside.     DISPOSITION: AR once bed available.     CORE MEASURES:        Admission NIHSS:      TPA: [] YES [x] NO      LDL/HDL: 163/64     Depression Screen: 0     Statin Therapy: N     Dysphagia Screen: [] PASS [x] FAIL     Smoking [] YES [x] NO      Afib [] YES [x] NO     Stroke Education [x] YES [] NO    Obtain screening lower extremity venous ultrasound in patients who meet 1 or more of the following criteria as patient is high risk for DVT/PE on admission:   [] History of DVT/PE  []Hypercoagulable states (Factor V Leiden, Cancer, OCP, etc. )  []Prolonged immobility (hemiplegia/hemiparesis/post operative or any other extended immobilization)  [] Transferred from outside facility (Rehab or Long term care)  [] Age </= to 50.

## 2022-11-29 ENCOUNTER — TRANSCRIPTION ENCOUNTER (OUTPATIENT)
Age: 67
End: 2022-11-29

## 2022-11-29 ENCOUNTER — INPATIENT (INPATIENT)
Facility: HOSPITAL | Age: 67
LOS: 10 days | Discharge: HOME HEALTH | DRG: 950 | End: 2022-12-10
Attending: PHYSICAL MEDICINE & REHABILITATION | Admitting: PHYSICAL MEDICINE & REHABILITATION
Payer: COMMERCIAL

## 2022-11-29 VITALS
OXYGEN SATURATION: 99 % | HEART RATE: 79 BPM | DIASTOLIC BLOOD PRESSURE: 84 MMHG | RESPIRATION RATE: 18 BRPM | SYSTOLIC BLOOD PRESSURE: 152 MMHG | TEMPERATURE: 99 F

## 2022-11-29 VITALS
OXYGEN SATURATION: 95 % | SYSTOLIC BLOOD PRESSURE: 165 MMHG | HEART RATE: 76 BPM | WEIGHT: 115.52 LBS | RESPIRATION RATE: 16 BRPM | DIASTOLIC BLOOD PRESSURE: 87 MMHG | HEIGHT: 62 IN | TEMPERATURE: 98 F

## 2022-11-29 DIAGNOSIS — F10.10 ALCOHOL ABUSE, UNCOMPLICATED: ICD-10-CM

## 2022-11-29 DIAGNOSIS — I10 ESSENTIAL (PRIMARY) HYPERTENSION: ICD-10-CM

## 2022-11-29 DIAGNOSIS — F02.80 DEMENTIA IN OTHER DISEASES CLASSIFIED ELSEWHERE, UNSPECIFIED SEVERITY, WITHOUT BEHAVIORAL DISTURBANCE, PSYCHOTIC DISTURBANCE, MOOD DISTURBANCE, AND ANXIETY: ICD-10-CM

## 2022-11-29 DIAGNOSIS — Z51.89 ENCOUNTER FOR OTHER SPECIFIED AFTERCARE: ICD-10-CM

## 2022-11-29 DIAGNOSIS — I61.8 OTHER NONTRAUMATIC INTRACEREBRAL HEMORRHAGE: ICD-10-CM

## 2022-11-29 DIAGNOSIS — R45.87 IMPULSIVENESS: ICD-10-CM
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
## 2022-11-29 DIAGNOSIS — S06.370A: ICD-10-CM

## 2022-11-29 DIAGNOSIS — E78.5 HYPERLIPIDEMIA, UNSPECIFIED: ICD-10-CM
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
## 2022-11-29 DIAGNOSIS — R23.4 CHANGES IN SKIN TEXTURE: ICD-10-CM
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
## 2022-11-29 DIAGNOSIS — R41.89 OTHER SYMPTOMS AND SIGNS INVOLVING COGNITIVE FUNCTIONS AND AWARENESS: ICD-10-CM

## 2022-11-29 DIAGNOSIS — R41.840 ATTENTION AND CONCENTRATION DEFICIT: ICD-10-CM

## 2022-11-29 DIAGNOSIS — S06.370D: ICD-10-CM

## 2022-11-29 LAB
CULTURE RESULTS: SIGNIFICANT CHANGE UP
CULTURE RESULTS: SIGNIFICANT CHANGE UP
SARS-COV-2 RNA SPEC QL NAA+PROBE: SIGNIFICANT CHANGE UP
SPECIMEN SOURCE: SIGNIFICANT CHANGE UP
SPECIMEN SOURCE: SIGNIFICANT CHANGE UP

## 2022-11-29 PROCEDURE — 97110 THERAPEUTIC EXERCISES: CPT

## 2022-11-29 PROCEDURE — 97166 OT EVAL MOD COMPLEX 45 MIN: CPT

## 2022-11-29 PROCEDURE — 87186 SC STD MICRODIL/AGAR DIL: CPT

## 2022-11-29 PROCEDURE — 93306 TTE W/DOPPLER COMPLETE: CPT

## 2022-11-29 PROCEDURE — 84443 ASSAY THYROID STIM HORMONE: CPT

## 2022-11-29 PROCEDURE — 93005 ELECTROCARDIOGRAM TRACING: CPT

## 2022-11-29 PROCEDURE — U0005: CPT

## 2022-11-29 PROCEDURE — 97116 GAIT TRAINING THERAPY: CPT

## 2022-11-29 PROCEDURE — 87086 URINE CULTURE/COLONY COUNT: CPT

## 2022-11-29 PROCEDURE — 70450 CT HEAD/BRAIN W/O DYE: CPT

## 2022-11-29 PROCEDURE — 86850 RBC ANTIBODY SCREEN: CPT

## 2022-11-29 PROCEDURE — 95700 EEG CONT REC W/VID EEG TECH: CPT

## 2022-11-29 PROCEDURE — 82607 VITAMIN B-12: CPT

## 2022-11-29 PROCEDURE — 86900 BLOOD TYPING SEROLOGIC ABO: CPT

## 2022-11-29 PROCEDURE — 87077 CULTURE AEROBIC IDENTIFY: CPT

## 2022-11-29 PROCEDURE — 85025 COMPLETE CBC W/AUTO DIFF WBC: CPT

## 2022-11-29 PROCEDURE — 82962 GLUCOSE BLOOD TEST: CPT

## 2022-11-29 PROCEDURE — 70496 CT ANGIOGRAPHY HEAD: CPT

## 2022-11-29 PROCEDURE — 81001 URINALYSIS AUTO W/SCOPE: CPT

## 2022-11-29 PROCEDURE — A9585: CPT

## 2022-11-29 PROCEDURE — 87040 BLOOD CULTURE FOR BACTERIA: CPT

## 2022-11-29 PROCEDURE — 86803 HEPATITIS C AB TEST: CPT

## 2022-11-29 PROCEDURE — 80076 HEPATIC FUNCTION PANEL: CPT

## 2022-11-29 PROCEDURE — 83036 HEMOGLOBIN GLYCOSYLATED A1C: CPT

## 2022-11-29 PROCEDURE — 95714 VEEG EA 12-26 HR UNMNTR: CPT

## 2022-11-29 PROCEDURE — 83735 ASSAY OF MAGNESIUM: CPT

## 2022-11-29 PROCEDURE — 70450 CT HEAD/BRAIN W/O DYE: CPT | Mod: 26

## 2022-11-29 PROCEDURE — 70553 MRI BRAIN STEM W/O & W/DYE: CPT

## 2022-11-29 PROCEDURE — 97530 THERAPEUTIC ACTIVITIES: CPT

## 2022-11-29 PROCEDURE — U0003: CPT

## 2022-11-29 PROCEDURE — 80061 LIPID PANEL: CPT

## 2022-11-29 PROCEDURE — 93970 EXTREMITY STUDY: CPT

## 2022-11-29 PROCEDURE — 86901 BLOOD TYPING SEROLOGIC RH(D): CPT

## 2022-11-29 PROCEDURE — 80048 BASIC METABOLIC PNL TOTAL CA: CPT

## 2022-11-29 PROCEDURE — 84100 ASSAY OF PHOSPHORUS: CPT

## 2022-11-29 PROCEDURE — 95711 VEEG 2-12 HR UNMONITORED: CPT

## 2022-11-29 PROCEDURE — 85027 COMPLETE CBC AUTOMATED: CPT

## 2022-11-29 PROCEDURE — 80307 DRUG TEST PRSMV CHEM ANLYZR: CPT

## 2022-11-29 PROCEDURE — 80053 COMPREHEN METABOLIC PANEL: CPT

## 2022-11-29 PROCEDURE — 84436 ASSAY OF TOTAL THYROXINE: CPT

## 2022-11-29 PROCEDURE — 72125 CT NECK SPINE W/O DYE: CPT | Mod: 26

## 2022-11-29 PROCEDURE — 73502 X-RAY EXAM HIP UNI 2-3 VIEWS: CPT | Mod: 26,LT

## 2022-11-29 PROCEDURE — 97162 PT EVAL MOD COMPLEX 30 MIN: CPT

## 2022-11-29 PROCEDURE — 70498 CT ANGIOGRAPHY NECK: CPT

## 2022-11-29 PROCEDURE — 36415 COLL VENOUS BLD VENIPUNCTURE: CPT

## 2022-11-29 PROCEDURE — 71045 X-RAY EXAM CHEST 1 VIEW: CPT

## 2022-11-29 PROCEDURE — 99285 EMERGENCY DEPT VISIT HI MDM: CPT | Mod: 25

## 2022-11-29 PROCEDURE — 84480 ASSAY TRIIODOTHYRONINE (T3): CPT

## 2022-11-29 PROCEDURE — 85610 PROTHROMBIN TIME: CPT

## 2022-11-29 PROCEDURE — 82746 ASSAY OF FOLIC ACID SERUM: CPT

## 2022-11-29 PROCEDURE — 85730 THROMBOPLASTIN TIME PARTIAL: CPT

## 2022-11-29 PROCEDURE — 81003 URINALYSIS AUTO W/O SCOPE: CPT

## 2022-11-29 PROCEDURE — 73030 X-RAY EXAM OF SHOULDER: CPT | Mod: 26,LT

## 2022-11-29 PROCEDURE — 92610 EVALUATE SWALLOWING FUNCTION: CPT

## 2022-11-29 PROCEDURE — 73070 X-RAY EXAM OF ELBOW: CPT | Mod: 26,LT

## 2022-11-29 RX ORDER — AMLODIPINE BESYLATE 2.5 MG/1
1 TABLET ORAL
Qty: 0 | Refills: 0 | DISCHARGE
Start: 2022-11-29

## 2022-11-29 RX ORDER — MULTIVIT-MIN/FERROUS GLUCONATE 9 MG/15 ML
1 LIQUID (ML) ORAL DAILY
Refills: 0 | Status: DISCONTINUED | OUTPATIENT
Start: 2022-11-29 | End: 2022-11-30

## 2022-11-29 RX ORDER — POLYETHYLENE GLYCOL 3350 17 G/17G
17 POWDER, FOR SOLUTION ORAL
Qty: 0 | Refills: 0 | DISCHARGE
Start: 2022-11-29

## 2022-11-29 RX ORDER — LISINOPRIL 2.5 MG/1
1 TABLET ORAL
Qty: 0 | Refills: 0 | DISCHARGE
Start: 2022-11-29

## 2022-11-29 RX ORDER — AMLODIPINE BESYLATE 2.5 MG/1
10 TABLET ORAL DAILY
Refills: 0 | Status: DISCONTINUED | OUTPATIENT
Start: 2022-11-30 | End: 2022-12-06

## 2022-11-29 RX ORDER — QUETIAPINE FUMARATE 200 MG/1
12.5 TABLET, FILM COATED ORAL EVERY 6 HOURS
Refills: 0 | Status: DISCONTINUED | OUTPATIENT
Start: 2022-11-29 | End: 2022-12-09

## 2022-11-29 RX ORDER — QUETIAPINE FUMARATE 200 MG/1
12.5 TABLET, FILM COATED ORAL AT BEDTIME
Refills: 0 | Status: DISCONTINUED | OUTPATIENT
Start: 2022-11-29 | End: 2022-12-09

## 2022-11-29 RX ORDER — POLYETHYLENE GLYCOL 3350 17 G/17G
17 POWDER, FOR SOLUTION ORAL
Refills: 0 | Status: DISCONTINUED | OUTPATIENT
Start: 2022-11-29 | End: 2022-12-10

## 2022-11-29 RX ORDER — SENNA PLUS 8.6 MG/1
2 TABLET ORAL AT BEDTIME
Refills: 0 | Status: DISCONTINUED | OUTPATIENT
Start: 2022-11-29 | End: 2022-12-10

## 2022-11-29 RX ORDER — MULTIVIT-MIN/FERROUS GLUCONATE 9 MG/15 ML
1 LIQUID (ML) ORAL
Qty: 0 | Refills: 0 | DISCHARGE
Start: 2022-11-29

## 2022-11-29 RX ORDER — ENOXAPARIN SODIUM 100 MG/ML
40 INJECTION SUBCUTANEOUS
Qty: 0 | Refills: 0 | DISCHARGE
Start: 2022-11-29

## 2022-11-29 RX ORDER — LISINOPRIL 2.5 MG/1
40 TABLET ORAL DAILY
Refills: 0 | Status: DISCONTINUED | OUTPATIENT
Start: 2022-11-30 | End: 2022-12-07

## 2022-11-29 RX ORDER — BNT162B2 ORIGINAL AND OMICRON BA.4/BA.5 .1125; .1125 MG/2.25ML; MG/2.25ML
0.3 INJECTION, SUSPENSION INTRAMUSCULAR ONCE
Refills: 0 | Status: DISCONTINUED | OUTPATIENT
Start: 2022-11-29 | End: 2022-11-29

## 2022-11-29 RX ORDER — FOLIC ACID 0.8 MG
1 TABLET ORAL
Qty: 0 | Refills: 0 | DISCHARGE
Start: 2022-11-29

## 2022-11-29 RX ORDER — THIAMINE MONONITRATE (VIT B1) 100 MG
1 TABLET ORAL
Qty: 0 | Refills: 0 | DISCHARGE
Start: 2022-11-29

## 2022-11-29 RX ORDER — INFLUENZA VIRUS VACCINE 15; 15; 15; 15 UG/.5ML; UG/.5ML; UG/.5ML; UG/.5ML
0.7 SUSPENSION INTRAMUSCULAR ONCE
Refills: 0 | Status: DISCONTINUED | OUTPATIENT
Start: 2022-11-29 | End: 2022-12-10

## 2022-11-29 RX ORDER — ACETAMINOPHEN 500 MG
2 TABLET ORAL
Qty: 0 | Refills: 0 | DISCHARGE
Start: 2022-11-29

## 2022-11-29 RX ORDER — FOLIC ACID 0.8 MG
1 TABLET ORAL DAILY
Refills: 0 | Status: DISCONTINUED | OUTPATIENT
Start: 2022-11-30 | End: 2022-12-10

## 2022-11-29 RX ORDER — ENOXAPARIN SODIUM 100 MG/ML
40 INJECTION SUBCUTANEOUS
Refills: 0 | Status: DISCONTINUED | OUTPATIENT
Start: 2022-11-29 | End: 2022-12-10

## 2022-11-29 RX ORDER — AMLODIPINE BESYLATE 2.5 MG/1
5 TABLET ORAL ONCE
Refills: 0 | Status: COMPLETED | OUTPATIENT
Start: 2022-11-29 | End: 2022-11-29

## 2022-11-29 RX ORDER — SENNA PLUS 8.6 MG/1
2 TABLET ORAL
Qty: 0 | Refills: 0 | DISCHARGE
Start: 2022-11-29

## 2022-11-29 RX ORDER — ACETAMINOPHEN 500 MG
650 TABLET ORAL EVERY 6 HOURS
Refills: 0 | Status: DISCONTINUED | OUTPATIENT
Start: 2022-11-29 | End: 2022-12-10

## 2022-11-29 RX ORDER — THIAMINE MONONITRATE (VIT B1) 100 MG
100 TABLET ORAL DAILY
Refills: 0 | Status: DISCONTINUED | OUTPATIENT
Start: 2022-11-30 | End: 2022-12-10

## 2022-11-29 RX ADMIN — AMLODIPINE BESYLATE 10 MILLIGRAM(S): 2.5 TABLET ORAL at 05:42

## 2022-11-29 RX ADMIN — QUETIAPINE FUMARATE 12.5 MILLIGRAM(S): 200 TABLET, FILM COATED ORAL at 21:00

## 2022-11-29 RX ADMIN — Medication 100 MILLIGRAM(S): at 12:13

## 2022-11-29 RX ADMIN — BNT162B2 ORIGINAL AND OMICRON BA.4/BA.5 0.3 MILLILITER(S): .1125; .1125 INJECTION, SUSPENSION INTRAMUSCULAR at 11:57

## 2022-11-29 RX ADMIN — SENNA PLUS 2 TABLET(S): 8.6 TABLET ORAL at 21:00

## 2022-11-29 RX ADMIN — Medication 650 MILLIGRAM(S): at 21:03

## 2022-11-29 RX ADMIN — AMLODIPINE BESYLATE 5 MILLIGRAM(S): 2.5 TABLET ORAL at 23:23

## 2022-11-29 RX ADMIN — Medication 1 MILLIGRAM(S): at 12:13

## 2022-11-29 RX ADMIN — Medication 1 TABLET(S): at 12:13

## 2022-11-29 RX ADMIN — ENOXAPARIN SODIUM 40 MILLIGRAM(S): 100 INJECTION SUBCUTANEOUS at 17:38

## 2022-11-29 RX ADMIN — QUETIAPINE FUMARATE 12.5 MILLIGRAM(S): 200 TABLET, FILM COATED ORAL at 00:23

## 2022-11-29 RX ADMIN — LISINOPRIL 40 MILLIGRAM(S): 2.5 TABLET ORAL at 05:42

## 2022-11-29 NOTE — DISCHARGE NOTE PROVIDER - HOSPITAL COURSE
67F with history of hypertension, dyslipidemia, new onset dementia, alcohol abuse (1/2-1 bottle of wine a day) presents after an unwitnessed fall with trauma to the front of the head on 11/17. The next day the patient was found to be confused. 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 No AC/AP. Patient s/p EVD placement 11/18 (removed 11/23). CTA without evidence of vascular malformation.    Impression: Left cerebellar hemorrhage, intraventricular hemorrhage, hydrocephalus S/P EVD Etiology includes uncontrolled hypertension versus vascular lesion. Appears to have multiple cavernomas on MRI which are likely etiology of ICH    CT Head No Cont (11.25.22 @ 09:12)   Evolving left cerebellar hematoma with surrounding lucency as seen on the   prior and mass effect on the fourth ventricle. No change in appearance of   the hematoma in the left cerebellum as well as trace heme in the right   frontal region along the prior shunt tract.    CT HEAD 11/24/22: Acute parenchymal hemorrhage involving the left cerebellar region is   again seen with surrounding edema. This finding measures approximately   2.6 x 2.3 cm and previously measured approximately 2.7 x 2.2 cm. Mass   effect on the fourth ventricle is again seen.    MRI HEAD 11/20/22: Left cerebellar hemorrhage with intraventricular extension   and mild hydrocephalus. Right frontal ventricular catheter. Nonspecific   white matter changes likely representing nonacute ischemia. Differential   diagnosis for hemorrhage includes hypertension, multiple cavernomas and   amyloid angiopathy.    CT HEAD 11/18/22: Intraventricular hemorrhage and hydrocephalus, unchanged. Left cerebellar   hemorrhage with fourth ventricular extension.    CTA H/N: Normal CTA of the head. No abnormal vascularity or aneurysm.   Mild left carotid calcification without significant stenosis.      s/p EVD removal on 11/23. Neurologically stable.   plan for MRI HEAD with contrast in 4-6 weeks (12/16-12/30/2022) once hemorrhage is reabsorbed to rule out underling malformation.    EEG 11/22: Moderate diffuse/multi-focal cerebral dysfunction, not specific as to etiology. There were no epileptiform abnormalities recorded x 1 day.   TTE: EF >80%, Hyperdynamic left ventricular systolic function. Cardiac monitoring. continue Lisinopril 40mg PO daily, added norvasc 10mg qd     + UTI started Ceftriaxone 11/26- final dose 11/28, UCx shows morganella morganii, sensitives pending. ID recommended to monitor off abx.     Physical therapy/OT: AR. Stable for discharge.

## 2022-11-29 NOTE — PROGRESS NOTE ADULT - ASSESSMENT
67F with history of hypertension, dyslipidemia, new onset dementia, alcohol abuse (1/2-1 bottle of wine a day) presents after an unwitnessed fall with trauma to the front of the head on 11/17. The next day the patient was found to be confused. 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 No AC/AP. Patient s/p EVD placement 11/18 (removed 11/23). CTA without evidence of vascular malformation.    Impression: Left cerebellar hemorrhage, intraventricular hemorrhage, hydrocephalus S/P EVD Etiology includes uncontrolled hypertension versus vascular lesion. Appears to have multiple cavernomas on MRI which are likely etiology of ICH    NEURO: Neurologically without change, stable cerebral edema with mass effect and brain compression, SBP goal < 140,  Neurosurgery following s/p EVD removal on 11/23. MRI Brain w/o noted above, plan for MRI HEAD with contrast in 4-6 weeks (12/16-12/30/2022) once hemorrhage is reabsorbed to rule out underling malformation.  EEG 11/22: Moderate diffuse/multi-focal cerebral dysfunction, not specific as to etiology. There were no epileptiform abnormalities recorded x 1 day. Physical therapy/OT: AR    ANTITHROMBOTIC THERAPY: none indicated due to hemorrhage    PULMONARY: CXR clear 11/23, protecting airway, saturating well     CARDIOVASCULAR: TTE: EF >80%, Hyperdynamic left ventricular systolic function. Cardiac monitoring. continue Lisinopril 40mg PO daily, added norvasc 10mg qd                            SBP goal: < 140    GASTROINTESTINAL:  dysphagia screen initially failed, now on diet       Diet: Regular    RENAL: BUN/Cr within normal limits 11/28, good urine output      Na Goal: Greater than 135     Ann: N    HEMATOLOGY: no signs of bleeding     DVT ppx: venodynes/lovenox subq started on 11/25 after repeat CTH     ID: febrile on 11/23: so si/sx of infection, no sign of infection, + UTI started Ceftriaxone 11/26- final dose 11/28, UCx shows morganella morganii, sensitives pending. ID recommended to monitor off abx.     OTHER: history of alcohol abuse (1/2-1 bottle of wine a day) was on CIWA, no signs of withdrawal w Normal Heart Rhythm  Plan of care discussed with patient and family at bedside.     DISPOSITION: AR once bed available.     CORE MEASURES:        Admission NIHSS:      TPA: [] YES [x] NO      LDL/HDL: 163/64     Depression Screen: 0     Statin Therapy: N     Dysphagia Screen: [] PASS [x] FAIL     Smoking [] YES [x] NO      Afib [] YES [x] NO     Stroke Education [x] YES [] NO    Obtain screening lower extremity venous ultrasound in patients who meet 1 or more of the following criteria as patient is high risk for DVT/PE on admission:   [] History of DVT/PE  []Hypercoagulable states (Factor V Leiden, Cancer, OCP, etc. )  []Prolonged immobility (hemiplegia/hemiparesis/post operative or any other extended immobilization)  [] Transferred from outside facility (Rehab or Long term care)  [] Age </= to 50.

## 2022-11-29 NOTE — PROGRESS NOTE ADULT - SUBJECTIVE AND OBJECTIVE BOX
Subjective: Patient seen and examined. No new events except as noted.     REVIEW OF SYSTEMS:    CONSTITUTIONAL: + weakness, fevers or chills  EYES/ENT: No visual changes;  No vertigo or throat pain   NECK: No pain or stiffness  RESPIRATORY: No cough, wheezing, hemoptysis; No shortness of breath  CARDIOVASCULAR: No chest pain or palpitations  GASTROINTESTINAL: No abdominal or epigastric pain. No nausea, vomiting, or hematemesis; No diarrhea or constipation. No melena or hematochezia.  GENITOURINARY: No dysuria, frequency or hematuria  NEUROLOGICAL: No numbness or weakness  SKIN: No itching, burning, rashes, or lesions   All other review of systems is negative unless indicated above.    MEDICATIONS:  MEDICATIONS  (STANDING):  amLODIPine   Tablet 10 milliGRAM(s) Oral daily  coronavirus bivalent (EUA) Booster Vaccine (PFIZER) 0.3 milliLiter(s) IntraMuscular once  enoxaparin Injectable 40 milliGRAM(s) SubCutaneous <User Schedule>  folic acid 1 milliGRAM(s) Oral daily  lisinopril 40 milliGRAM(s) Oral daily  multivitamin/minerals 1 Tablet(s) Oral daily  polyethylene glycol 3350 17 Gram(s) Oral two times a day  QUEtiapine 12.5 milliGRAM(s) Oral at bedtime  senna 2 Tablet(s) Oral at bedtime  thiamine 100 milliGRAM(s) Oral daily    PHYSICAL EXAM:  T(C): 37 (11-29-22 @ 05:00), Max: 37.2 (11-28-22 @ 12:00)  HR: 69 (11-29-22 @ 05:00) (67 - 100)  BP: 134/80 (11-29-22 @ 05:00) (134/80 - 156/82)  RR: 18 (11-29-22 @ 05:00) (14 - 21)  SpO2: 97% (11-29-22 @ 05:00) (96% - 100%)  Wt(kg): --  I&O's Summary    28 Nov 2022 07:01  -  29 Nov 2022 07:00  --------------------------------------------------------  IN: 0 mL / OUT: 200 mL / NET: -200 mL  Appearance: Normal	  HEENT:   Normal oral mucosa, PERRL, EOMI	  Lymphatic: No lymphadenopathy , no edema  Cardiovascular: Normal S1 S2, No JVD, No murmurs , Peripheral pulses palpable 2+ bilaterally  Respiratory: Lungs clear to auscultation, normal effort 	  Gastrointestinal:  Soft, Non-tender, + BS	  Skin: No rashes, No ecchymoses, No cyanosis, warm to touch  NEUROLOGICAL EXAM:  Mental status: Awake, alert, oriented x3, no aphasia, no neglect, normal memory   Cranial Nerves: No facial asymmetry, no nystagmus, mild dysarthria,  tongue midline  Motor exam: Normal tone, no drift, 5/5 RUE, 5/5 RLE, 5/5 LUE, 5/5 LLE, normal fine finger movements.  Sensation: Intact to light touch   Coordination/ Gait: No dysmetria, AVRIL intact and symmetric bilaterally  Ext: No edema    LABS:    CARDIAC MARKERS:                       13.3   5.48  )-----------( 361      ( 28 Nov 2022 05:30 )             39.0     11-28    138  |  102  |  10  ----------------------------<  108<H>  4.4   |  24  |  0.58    Ca    9.4      28 Nov 2022 05:31    proBNP:   Lipid Profile:   HgA1c:   TSH:     TELEMETRY: SR 50-70    ECG:  	  RADIOLOGY:   DIAGNOSTIC TESTING:  [ ] Echocardiogram:  [ ]  Catheterization:  [ ] Stress Test:    OTHER:

## 2022-11-29 NOTE — H&P ADULT - NSHPSOCIALHISTORY_GEN_ALL_CORE
ETOH use  Smoker?***    Pt [place] 3 children in [place], 12STE+15 to bedroom. Pt was independent with all ADLS/abilities, no specific dx of Alzheimers however daughter stated in PT note that pt can be forgetful at times. Has not drove in past ~4 yrs.    Current Fn Status:  Gait Training: minimum assist (75% patient effort);  2 person assist;  verbal cues;  nonverbal cues  Min-A 1 person transfers and bed mobility ETOH use  Smoking - denies    Pt lives with her  and 2 children in , 12STE+15 to bedroom. Pt was independent with all ADLS/abilities, family states patient was missing bill payments prior to recent hospital diagnosis. Has not driven in past ~4 yrs.    Current Fn Status:  Gait Training: minimum assist (75% patient effort);  2 person assist;  verbal cues;  nonverbal cues  Min-A 1 person transfers and bed mobility

## 2022-11-29 NOTE — DISCHARGE NOTE PROVIDER - NSDCMRMEDTOKEN_GEN_ALL_CORE_FT
acetaminophen 325 mg oral tablet: 2 tab(s) orally every 6 hours, As needed, Mild Pain (1 - 3)  amLODIPine 10 mg oral tablet: 1 tab(s) orally once a day  enoxaparin: 40 unit(s) subcutaneous once a day for DVT ppx  folic acid 1 mg oral tablet: 1 tab(s) orally once a day  lisinopril 40 mg oral tablet: 1 tab(s) orally once a day  Multiple Vitamins with Minerals oral tablet: 1 tab(s) orally once a day  polyethylene glycol 3350 oral powder for reconstitution: 17 gram(s) orally 2 times a day  senna leaf extract oral tablet: 2 tab(s) orally once a day (at bedtime)  SEROquel 25 mg oral tablet: 0.5 tab(s) orally once a day (at bedtime)  thiamine 100 mg oral tablet: 1 tab(s) orally once a day

## 2022-11-29 NOTE — H&P ADULT - NSHPLABSRESULTS_GEN_ALL_CORE
CT Head No Cont (11.25.22 @ 09:12) >  IMPRESSION:  Evolving left cerebellar hematoma with surrounding lucency as seen on the   prior and mass effect on thefourth ventricle. No change in appearance of   the hematoma in the left cerebellum as well as trace heme in the right   frontal region along the prior shunt tract.    < from: [facility] Duplex Lower Ext Vein Scan, Bilat (11.24.22 @ 15:53) >  IMPRESSION:  No evidence of deep venous thrombosis in either lower extremity.    CT HEAD 11/24/22: Acute parenchymal hemorrhage involving the left cerebellar region is   again seen with surrounding edema. This finding measures approximately   2.6 x 2.3 cm and previously measured approximately 2.7 x 2.2 cm. Mass   effect on the fourth ventricle is again seen.    MRI HEAD 11/20/22: Left cerebellar hemorrhage with intraventricular extension   and mild hydrocephalus. Right frontal ventricular catheter. Nonspecific   white matter changes likely representing nonacute ischemia. Differential   diagnosis for hemorrhage includes hypertension, multiple cavernomas and   amyloid angiopathy.    CT HEAD 11/18/22: Intraventricular hemorrhage and hydrocephalus, unchanged. Left cerebellar   hemorrhage with fourth ventricular extension.    CTA H/N: Normal CTA of the head. No abnormal vascularity or aneurysm.   Mild left carotid calcification without significant stenosis. CT Head No Cont (11.25.22 @ 09:12) >  IMPRESSION:  Evolving left cerebellar hematoma with surrounding lucency as seen on the   prior and mass effect on thefourth ventricle. No change in appearance of   the hematoma in the left cerebellum as well as trace heme in the right   frontal region along the prior shunt tract.    < from: [facility] Duplex Lower Ext Vein Scan, Bilat (11.24.22 @ 15:53) >  IMPRESSION:  No evidence of deep venous thrombosis in either lower extremity.    CT HEAD 11/24/22: Acute parenchymal hemorrhage involving the left cerebellar region is   again seen with surrounding edema. This finding measures approximately   2.6 x 2.3 cm and previously measured approximately 2.7 x 2.2 cm. Mass   effect on the fourth ventricle is again seen.    MRI HEAD 11/20/22: Left cerebellar hemorrhage with intraventricular extension   and mild hydrocephalus. Right frontal ventricular catheter. Nonspecific   white matter changes likely representing nonacute ischemia. Differential   diagnosis for hemorrhage includes hypertension, multiple cavernomas and   amyloid angiopathy.    CT HEAD 11/18/22: Intraventricular hemorrhage and hydrocephalus, unchanged. Left cerebellar   hemorrhage with fourth ventricular extension.    CTA H/N: Normal CTA of the head. No abnormal vascularity or aneurysm.   Mild left carotid calcification without significant stenosis.    < from: Transthoracic Echocardiogram (11.20.22 @ 14:49) >  Conclusions:  1. Normal mitral valve. Minimal mitral regurgitation.  2. Aortic valve not well visualized; appears to be a  calcified trileaflet valve with normal opening. No aortic  valve regurgitation seen.  3. Hyperdynamic left ventricular systolic function.  Measured intracavitary gradient of approximately 22 mmHg  seen at rest. Intracavitary gradient may be underesimated  on this study.  4. Normal right ventricular size and function.  *** No previous Echo exam.  < end of copied text >

## 2022-11-29 NOTE — PATIENT PROFILE ADULT - FALL HARM RISK - HARM RISK INTERVENTIONS
Assistance with ambulation/Assistance OOB with selected safe patient handling equipment/Communicate Risk of Fall with Harm to all staff/Discuss with provider need for PT consult/Monitor gait and stability/Reinforce activity limits and safety measures with patient and family/Tailored Fall Risk Interventions/Use of alarms - bed, chair and/or voice tab/Visual Cue: Yellow wristband and red socks/Bed in lowest position, wheels locked, appropriate side rails in place/Call bell, personal items and telephone in reach/Instruct patient to call for assistance before getting out of bed or chair/Non-slip footwear when patient is out of bed/[place] to call system/Physically safe environment - no spills, clutter or unnecessary equipment/Purposeful Proactive Rounding/Room/bathroom lighting operational, light cord in reach

## 2022-11-29 NOTE — H&P ADULT - ATTENDING COMMENTS
Progress note amended to include my discussions with patient, resident, hospitalist, RN, SW, PT and my findings    Patient seen with daughter in PT. Patient did not recall fall last night, or even going down for imaging; per family, she did remember last night the fall. currently does not complain of any H.?A, dizziness, no shoudler or hip pain, no cervical pain. Results of Head and Neck CT reviewed with patient and family; Xray left UE/elbow/shoulder and pelvis/hip reviewed by me, official results pending but no acute fracture noted. Patient also denies any pain with WB activity extremities, transfers or standing activities. Seen in PT ambulating with RW, narrow based gait, short stride length, very distractable and impulsive, and needs constant cues for safety.     Alert eyes open spontaneously, O x place grossly not time. GCS=14 ([id number]). follows simple commands, reduced 2 step command following, poor simple attention. pleasant, mood stable, cooperative. BUE and LE normal tone and ROm, left upper arm with small swelling, NT, mobile ?cyst vs lipoma (seen by physician as outpt, possible drainage discusseD). motor 5/5 bilateral shoulder, elbow flexion and extension, gross grasp. bilateral Hf, quad, ham and ankle PF and DF 5/5. no calf swelling or pedal edema. No sensory deficits LT. Has difficulty with EOMi but no nystagmus noted and EOM appear intact. no facial droop or sensory deficits LT    Vitals reviewed, BP not controlled, will request hospitalist f/u for management, on norvasc and lisinopril. hydralazine 25 bid added. Safety reinforced with patient, bed and chair alarms ordered along with toileting schedule after meals, qhs, and possible video monitoring. Deneis any dysuria or frequency, recently completed Rx UTI. Head CT, C spine CT and imaging as above, input by me and reviewed with family. Labs reviewed, stable    comprehensive rehab program in progress    RECENT LABS    Vital Signs Last 24 Hrs  T(C): 37.1 (30 Nov 2022 07:57), Max: 37.1 (29 Nov 2022 14:18)  T(F): 98.7 (30 Nov 2022 07:57), Max: 98.7 (29 Nov 2022 14:18)  HR: 79 (30 Nov 2022 07:57) (73 - 93)  BP: 155/91 (30 Nov 2022 07:57) (152/84 - 178/96)  BP(mean): --  RR: 16 (30 Nov 2022 07:57) (16 - 18)  SpO2: 96% (30 Nov 2022 07:57) (95% - 100%)    Parameters below as of 30 Nov 2022 07:57  Patient On (Oxygen Delivery Method): room air                              13.6   7.70  )-----------( 412      ( 30 Nov 2022 05:50 )             40.2     11-30    139  |  104  |  12  ----------------------------<  112<H>  4.0   |  23  |  0.71    Ca    9.2      30 Nov 2022 05:50          CAPILLARY BLOOD GLUCOSE

## 2022-11-29 NOTE — PROGRESS NOTE ADULT - REASON FOR ADMISSION
CEREBELLAR ICH
ICH/IVH

## 2022-11-29 NOTE — CHART NOTE - NSCHARTNOTEFT_GEN_A_CORE
I was notifed that patient fell. Per RN, patient fell and landed on left hip. Per patient, she said she hit the left side of her head and left shoulder.     On exam, pt BP elevated SBP 170s manual., HR 92 BPM. All other VS WNL    Patient was AAOx1, knows self but does not know date. Think she is also in her doctor's private clinic.  Neurological exam is otherwise unchanged from admission.   +s1/s2, no m/r/g, regular rhythm, elevated rate    I spoke w/ pt's daughter Marilyn via telephone.   Per RN report and per Daughter, she was already confused before she arrived.    Plan:   - CTH and C-spine, left shoulder, left elbow, left hip and pelvis.  - Pt's family will stay overnight w/ her.

## 2022-11-29 NOTE — PROGRESS NOTE ADULT - SUBJECTIVE AND OBJECTIVE BOX
THE PATIENT WAS SEEN AND EXAMINED BY ME WITH THE HOUSESTAFF AND STROKE TEAM DURING MORNING ROUNDS.   HPI:  66 y/o female with history of hypertension, dyslipidemia on ASA, new onset dementia, alcohol abuse (1/2-1 bottle of wine a day) presented after a fall. Patient unable to provide history. Patient apparently had an unwitnessed fall with trauma to the front of the head on 11/17. The next day the patient was found to be confused, at that time the patient was taken to [facility] where she was found to have an ICH and transferred [facility] for further management.   CTH w/cerebellar IPH with IVH in 4th/3rd vent, ventriculomegaly, decreased but patent prepontine cistern. No AC/AP. SBP 110s-140s. Plt 219. GCS 14. ICH score 2. SBP  Patient s/p EVD placement 11/18 (removed 11/23) emergently in the ER and transferred to [facility].  Now transferred to stroke service on 11/25.    SUBJECTIVE: No events overnight.  No new neurologic complaints.  ROS reported negative unless otherwise noted.    acetaminophen     Tablet .. 650 milliGRAM(s) Oral every 6 hours PRN  amLODIPine   Tablet 10 milliGRAM(s) Oral daily  coronavirus bivalent (EUA) Booster Vaccine (PFIZER) 0.3 milliLiter(s) IntraMuscular once  enoxaparin Injectable 40 milliGRAM(s) SubCutaneous <User Schedule>  folic acid 1 milliGRAM(s) Oral daily  lisinopril 40 milliGRAM(s) Oral daily  multivitamin/minerals 1 Tablet(s) Oral daily  polyethylene glycol 3350 17 Gram(s) Oral two times a day  QUEtiapine 12.5 milliGRAM(s) Oral every 6 hours PRN  QUEtiapine 12.5 milliGRAM(s) Oral at bedtime  senna 2 Tablet(s) Oral at bedtime  thiamine 100 milliGRAM(s) Oral daily      PHYSICAL EXAM:   Vital Signs Last 24 Hrs  T(C): 37 (29 Nov 2022 05:00), Max: 37.2 (28 Nov 2022 12:00)  T(F): 98.6 (29 Nov 2022 05:00), Max: 98.9 (28 Nov 2022 12:00)  HR: 69 (29 Nov 2022 05:00) (67 - 100)  BP: 134/80 (29 Nov 2022 05:00) (134/80 - 170/92)  BP(mean): 103 (28 Nov 2022 20:00) (93 - 115)  RR: 18 (29 Nov 2022 05:00) (14 - 21)  SpO2: 97% (29 Nov 2022 05:00) (96% - 100%)    Parameters below as of 29 Nov 2022 05:00  Patient On (Oxygen Delivery Method): room air      General: No acute distress  HEENT: EOM intact, visual fields full  Abdomen: Soft, nontender, nondistended   Extremities: No edema    NEUROLOGICAL EXAM:  Mental status: Awake, alert, oriented x3, no aphasia, no neglect, normal memory   Cranial Nerves: No facial asymmetry, no nystagmus, mild dysarthria,  tongue midline  Motor exam: Normal tone, no drift, 5/5 RUE, 5/5 RLE, 5/5 LUE, 5/5 LLE, normal fine finger movements.  Sensation: Intact to light touch   Coordination/ Gait: No dysmetria, AVRIL intact and symmetric bilaterally    LABS:                        13.3   5.48  )-----------( 361      ( 28 Nov 2022 05:30 )             39.0    11-28    138  |  102  |  10  ----------------------------<  108<H>  4.4   |  24  |  0.58    Ca    9.4      28 Nov 2022 05:31          IMAGING: Reviewed by me.     IMAGING: Reviewed by me.   CT Head No Cont (11.25.22 @ 09:12)   Evolving left cerebellar hematoma with surrounding lucency as seen on the   prior and mass effect on the fourth ventricle. No change in appearance of   the hematoma in the left cerebellum as well as trace heme in the right   frontal region along the prior shunt tract.    CT HEAD 11/24/22: Acute parenchymal hemorrhage involving the left cerebellar region is   again seen with surrounding edema. This finding measures approximately   2.6 x 2.3 cm and previously measured approximately 2.7 x 2.2 cm. Mass   effect on the fourth ventricle is again seen.    MRI HEAD 11/20/22: Left cerebellar hemorrhage with intraventricular extension   and mild hydrocephalus. Right frontal ventricular catheter. Nonspecific   white matter changes likely representing nonacute ischemia. Differential   diagnosis for hemorrhage includes hypertension, multiple cavernomas and   amyloid angiopathy.    CT HEAD 11/18/22: Intraventricular hemorrhage and hydrocephalus, unchanged. Left cerebellar   hemorrhage with fourth ventricular extension.    CTA H/N: Normal CTA of the head. No abnormal vascularity or aneurysm.   Mild left carotid calcification without significant stenosis.

## 2022-11-29 NOTE — DISCHARGE NOTE PROVIDER - PROVIDER TOKENS
PROVIDER:[TOKEN:[[id number]],FOLLOWUP:[1 week]],PROVIDER:[TOKEN:[[id number]],FOLLOWUP:[2 weeks]]

## 2022-11-29 NOTE — H&P ADULT - NSHPPHYSICALEXAM_GEN_ALL_CORE
Vital Signs  T(C): 37.1 (11-29-22 @ 14:18), Max: 37.1 (11-28-22 @ 16:00)  HR: 79 (11-29-22 @ 14:18) (67 - 86)  BP: 152/84 (11-29-22 @ 14:18) (134/80 - 156/82)  RR: 18 (11-29-22 @ 14:18) (15 - 21)  SpO2: 99% (11-29-22 @ 14:18) (96% - 100%)    Gen - NAD, Comfortable  HEENT - NCAT, EOMI, MMM  Neck - Supple, No limited ROM  Pulm - CTAB, No wheeze, No rhonchi, No crackles  Cardiovascular - RRR, S1S2, No m/r/g  Abdomen - Soft, NT/ND, +BS  Extremities - No C/C/E, No calf tenderness  Neuro-     Cognitive - AO to self, "hospital," and day of week. Unable to name month (February) or city.      Communication - Fluent, No dysarthria     Attention: easily distracted. In tact naming & repetition.      Memory: Recall 3 objects immediate and 0/3 min later with clues (did not remember that there were words she repeated back to me). Limited insight into hospital diagnosis.          Cranial Nerves - CN 2-12 intact     Motor -                    LEFT    UE - ShAB 5/5, EF 5/5, EE 5/5, WE 5/5,  5/5                    RIGHT UE - ShAB 5/5, EF 5/5, EE 5/5, WE 5/5,  5/5                    LEFT    LE - HF 5/5, KE 5/5, DF 5/5, PF 5/5                    RIGHT LE - HF 5/5, KE 5/5, DF 5/5, PF 5/5        Sensory - Intact to LT     Reflexes - DTR Intact, No primitive reflex     Coordination - FTN intact     Tone - normal  Psychiatric - Mood stable, Affect WNL. Distractable  Skin: Intact. 5 staples scalp.   Wounds: None Present Vital Signs  T(C): 37.1 (11-29-22 @ 14:18), Max: 37.1 (11-28-22 @ 16:00)  HR: 79 (11-29-22 @ 14:18) (67 - 86)  BP: 152/84 (11-29-22 @ 14:18) (134/80 - 156/82)  RR: 18 (11-29-22 @ 14:18) (15 - 21)  SpO2: 99% (11-29-22 @ 14:18) (96% - 100%)    Gen - NAD, Comfortable. Pleasant, poor recall and awareness. Seen with daughter at bedside   HEENT - staples c/D/I no swelling EOMI, MMM    Pulm - CTAB, No wheeze, No rhonchi, No crackles  Cardiovascular - RRR, S1S2, No m/r/g  Abdomen - Soft, NT/ND, +BS  Extremities - No C/C/E, No calf tenderness  Neuro-     Cognitive - AO to self, "hospital," and day of week. Unable to name month (February) or city.      Communication - Fluent, No dysarthria     Attention: easily distracted. In tact naming & repetition.      Memory: Recall 3 objects immediate and 0/3 min later with clues (did not remember that there were words she repeated back to me). Limited insight into hospital diagnosis.          Cranial Nerves - CN 2-12 intact     Motor -                    LEFT    UE - ShAB 5/5, EF 5/5, EE 5/5, WE 5/5,  5/5                    RIGHT UE - ShAB 5/5, EF 5/5, EE 5/5, WE 5/5,  5/5                    LEFT    LE - HF 5/5, KE 5/5, DF 5/5, PF 5/5                    RIGHT LE - HF 5/5, KE 5/5, DF 5/5, PF 5/5        Sensory - Intact to LT     Reflexes - DTR Intact, No primitive reflex     Coordination - FTN intact     Tone - normal  Psychiatric - Mood stable, Affect WNL. Distractable  Skin: Intact. 5 staples scalp.   Wounds: None Present

## 2022-11-29 NOTE — PROGRESS NOTE ADULT - ASSESSMENT
67F with history of hypertension, dyslipidemia, new onset dementia, alcohol abuse (1/2-1 bottle of wine a day) presents after an unwitnessed fall with trauma to the front of the head on 11/17. The next day the patient was found to be confused. 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 No AC/AP. Patient s/p EVD placement 11/18 (removed 11/23). CTA without evidence of vascular malformation.

## 2022-11-29 NOTE — H&P ADULT - SOCIAL HISTORY: ALCOHOL USE
unknown amount, no hard alcohol, +wine and beer.  Daughter states more than 10 drinks in a week, patient not a reliable historian. Family and patient agree that drinking does negatively affect mood.

## 2022-11-29 NOTE — H&P ADULT - ASSESSMENT
Assessment/Plan:  MCKENZIE MORELAND is a 67y with a history of *** who presented to *** on *** with complaint of *** and found to have ***. Hospital course complicated by ***. Now admitted to [facility] after for initiation of a multidisciplinary rehab program consisting focused on functional mobility, transfers and ADLs (activities of daily living).      Comprehensive Multidisciplinary Rehab Program:  - Start comprehensive rehab program, PT/OT/SLP 3 hours a day, 5 days a week.    Participation Restrictions/Precautions:  - Weight bearing status: ****  - ROM restrictions: ***  - Precautions: falls    [ ] Falls   [ ] Spinal   [ ] Hip (Anterior or Posterior)       [ ] Seizure  [ ] Cardiac   [ ] Sternal    Rehab Diagnosis/Management:  Mood/Cognition:  - Neuropsychology consult  - [ ] Enhanced Supervision  [ ] Constant Observation    Sleep:   - Maintain quiet hours and low stim environment.  - Melatonin PRN to maximize participation in therapy during the day.   - Monitor sleep logs    Pain Management:  - Tylenol PRN  - Avoid sedating medications that may interfere with cognitive recovery    GI/Bowel:  - At risk for constipation due to neurologic diagnosis, immobility and/or medication use  - Senna QHS, Miralax PRN Daily  - GI ppx:    /Bladder:   - At risk for incontinence and retention due to neurologic diagnosis and limited mobility  - Currently patient voids:    [ ] independent     [ ] external collection device (condom cath)    [ ] Indwelling emmanuel catheter     [ ] Intermittent catheterization  - Continue catheter/bladder nursing protocol with bladder scans Q**** hours with straight cath for >400cc.  - Encourage timed voids every 4 hours while awake for independence and to promote continence during therapy.    Skin/Pressure Injury:   - At risk for pressure injury due to neurologic diagnosis and relative immobility.  - Skin assessment on admission: ***  - Monitor Incisions: ***  - Turn every 2 hours while in bed, air mattress  - Soft heel protectors  - Skin barrier cream as needed  - Nursing to monitor skin Qshift    Diet/Dysphagia:  - Diet Consistency/Modifications: ***  - Supplements: ***  - Aspiration Precautions  - SLP consult for swallow function evaluation and treatment  - Nutrition consult for evaluation and recommendations    DVT ppx:  - ***  - Last Doppler on 11/24/22  ---------------  Code Status/Emergency Contact:    Outpatient Follow-up (Specialty/Name of physician):    --------------  Goals: Safe discharge to home  Estimated Length of Stay: 10-14 days  Rehab Potential: Good  Medical Prognosis: Good  Estimated Disposition: Home with Home Care  ---------------    PRESCREEN COMPARISON:  I have reviewed the prescreen information and I have found no relevant changes between the preadmission screening and my post admission evaluation.    RATIONALE FOR INPATIENT ADMISSION: Patient demonstrates the following:  [X] Medically appropriate for rehabilitation admission  [X] Has attainable rehab goals with an appropriate initial discharge plan  [X]Has rehabilitation potential (expected to make a significant improvement within a reasonable period of time)  [X] Requires close medical management by a rehab physician, rehab nursing care, Hospitalist and comprehensive interdisciplinary team (including PT, OT and/or SLP, Prosthetics and Orthotics)       Assessment/Plan:  MCKENZIE MORELAND is a 67y with a history of HTN and ETOH abuse who presented to [facility] on 11/18 with complaint of unwitnessed fall with confusion and found to have IPH and IVH. Hospital course complicated by UTI, s/p abx. Now admitted to [facility] after for initiation of a multidisciplinary rehab program consisting focused on functional mobility, transfers and ADLs (activities of daily living).      Comprehensive Multidisciplinary Rehab Program:  - Start comprehensive rehab program, PT/OT/SLP 3 hours a day, 5 days a week.    Participation Restrictions/Precautions:  - Precautions: falls     Rehab Diagnosis/Management:  Mood/Cognition:  - Neuropsychology consult PRN   - c/w seroquel at bedtime, will monitor and consider d/c as appropriate    Sleep:   - Maintain quiet hours and low stim environment.  - comfort measures    Pain Management:  - Tylenol PRN  - Avoid sedating medications that may interfere with cognitive recovery    GI/Bowel:  - At risk for constipation due to neurologic diagnosis, immobility and/or medication use  - Senna QHS, Miralax PRN Daily    /Bladder:   - At risk for incontinence and retention due to neurologic diagnosis and limited mobility  - Currently patient voids independently  - BS once on admission with SC for PVR >400ml  - Encourage timed voids every 4 hours while awake for independence and to promote continence during therapy.    Skin/Pressure Injury:   - At risk for pressure injury due to neurologic diagnosis and relative immobility.  - Skin assessment on admission: ***  - Skin barrier cream as needed  - Nursing to monitor skin Qshift    Diet/Dysphagia:  - Diet Consistency/Modifications: regular thins  - Aspiration Precautions  - c/w thiamine, folic acid, and multivitamin  - SLP consult for swallow function evaluation and treatment  - Nutrition consult for evaluation and recommendations    HTN  - c/w amlodipine  - c/w lisinopril    DVT ppx:  - Lovenox  - no further AC/AP indicated due to IPH   - Last Doppler on 11/24/22  ---------------  Code Status/Emergency Contact:    Outpatient Follow-up (Specialty/Name of physician):  David Franco (DO)  Neurology; Vascular Neurology  [address], [address]  [place] [address]  Phone: [phone]  Fax: [phone]  Follow Up Time: 1 week    Abdifatah Molina ()  Cardiology; Internal Medicine  [address], [address]  [place] [address]  Phone: [phone]  Fax: [phone]  Follow Up Time: 2 weeks  --------------  Goals: Safe discharge to home  Estimated Length of Stay: 10-14 days  Rehab Potential: Good  Medical Prognosis: Good  Estimated Disposition: Home with Home Care  ---------------    PRESCREEN COMPARISON:  I have reviewed the prescreen information and I have found no relevant changes between the preadmission screening and my post admission evaluation.    RATIONALE FOR INPATIENT ADMISSION: Patient demonstrates the following:  [X] Medically appropriate for rehabilitation admission  [X] Has attainable rehab goals with an appropriate initial discharge plan  [X]Has rehabilitation potential (expected to make a significant improvement within a reasonable period of time)  [X] Requires close medical management by a rehab physician, rehab nursing care, Hospitalist and comprehensive interdisciplinary team (including PT, OT and/or SLP, Prosthetics and Orthotics)       67y with a PMH HTN and ETOH abuse who presented to [facility] on 11/18/22 s/p unwitnessed fall with confusion and found to have cerebellar IPH and IVH, s/p EVD. Hospital course complicated by UTI, s/p abx. Now admitted to [facility] after for initiation of a multidisciplinary rehab program consisting focused on functional mobility, transfers and ADLs    # cerebellar IPH and IVH  - Head CT 11/25: Evolving left cerebellar hematoma with surrounding lucency as seen on the prior and mass effect on the fourth ventricle. No change in appearance of the hematoma in the left cerebellum as well as trace heme in the right frontal region along the prior shunt tract.  - neuro checks q 12 hours  - Start comprehensive rehab program, PT/OT/SLP 3 hours a day, 5 days a week.  - Precautions: falls, cardiac, AMS    # Mood/Cognition/EtohA  - c/w seroquel at bedtime, will monitor and consider d/c as appropriate  - c/w thiamine, folic acid, and multivitamin  - Neuropsychology consult PRN   - social work, recreation therapy    # HTN  - c/w amlodipine  - c/w lisinopril  - hospitalist consult, monitor vitals    # Sleep:   - Maintain quiet hours and low stim environment.  - comfort measures    # Pain Management:  - Tylenol PRN    # GI/Bowel:  - At risk for constipation due to neurologic diagnosis, immobility and/or medication use  - Senna QHS, Miralax PRN Daily    # /Bladder:   - At risk for incontinence and retention due to neurologic diagnosis and limited mobility  - Currently patient voids independently  - BS once on admission with SC for PVR >400ml  - Encourage timed voids every 4 hours while awake for independence and to promote continence during therapy.    # Skin/Pressure Injury:   - At risk for pressure injury due to neurologic diagnosis and relative immobility.  - Skin assessment on admission: ***  - Skin barrier cream as needed  - Nursing to monitor skin Qshift    # Diet:  - Diet Consistency/Modifications: regular thins  - Nutrition consult for evaluation and recommendations    # DVT ppx:  - Lovenox  - no further AC/AP indicated due to IPH   - Last Doppler on 11/24/22    # LABS  CBC BMP 11/30  ---------------  Code Status/Emergency Contact:    Outpatient Follow-up (Specialty/Name of physician):  David Franco (DO)  Neurology; Vascular Neurology  [address], [address]  [place] [address]  Phone: [phone]  Fax: [phone]  Follow Up Time: 1 week    Abdifatah Molina (DO)  Cardiology; Internal Medicine  [address] [facility] [facility] [address] [facility], [address]  [place] [address]  Phone: [phone]  Fax: [phone]  Follow Up Time: 2 weeks  --------------  Goals: Safe discharge to home  Estimated Length of Stay: 10-14 days  Rehab Potential: Good  Medical Prognosis: Good  Estimated Disposition: Home with Home Care  ---------------    PRESCREEN COMPARISON:  I have reviewed the prescreen information and I have found no relevant changes between the preadmission screening and my post admission evaluation.    RATIONALE FOR INPATIENT ADMISSION: Patient demonstrates the following:  [X] Medically appropriate for rehabilitation admission  [X] Has attainable rehab goals with an appropriate initial discharge plan  [X]Has rehabilitation potential (expected to make a significant improvement within a reasonable period of time)  [X] Requires close medical management by a rehab physician, rehab nursing care, Hospitalist and comprehensive interdisciplinary team (including PT, OT and/or SLP, Prosthetics and Orthotics)       67y with a PMH HTN and ETOH abuse who presented to [facility] on 11/18/22 s/p unwitnessed fall with confusion and found to have cerebellar IPH and IVH, s/p EVD. Hospital course complicated by UTI, s/p abx. Now admitted to [facility] after for initiation of a multidisciplinary rehab program consisting focused on functional mobility, transfers and ADLs    # cerebellar IPH and IVH  - Head CT 11/25: Evolving left cerebellar hematoma with surrounding lucency as seen on the prior and mass effect on the fourth ventricle. No change in appearance of the hematoma in the left cerebellum as well as trace heme in the right frontal region along the prior shunt tract.  - neuro checks q 12 hours  - Start comprehensive rehab program, PT/OT/SLP 3 hours a day, 5 days a week.  - Precautions: falls, cardiac, AMS    # Mood/Cognition/EtohA  - c/w seroquel at bedtime, will monitor and consider d/c as appropriate  - c/w thiamine, folic acid, and multivitamin  - Neuropsychology consult PRN   - social work, recreation therapy    # HTN  - c/w amlodipine  - c/w lisinopril  - hospitalist consult, monitor vitals    # Sleep:   - Maintain quiet hours and low stim environment.  - comfort measures    # Pain Management:  - Tylenol PRN    # GI/Bowel:  - At risk for constipation due to neurologic diagnosis, immobility and/or medication use  - Senna QHS, Miralax PRN Daily    # /Bladder:   - At risk for incontinence and retention due to neurologic diagnosis and limited mobility  - Currently patient voids independently  - BS once on admission with SC for PVR >400ml  - Encourage timed voids every 4 hours while awake for independence and to promote continence during therapy.    # Skin/Pressure Injury:   - At risk for pressure injury due to neurologic diagnosis and relative immobility.  - Skin assessment on admission: 5 staples scalp. 3 small scabs on sacrum, no surrounding erythema or signs of excoriation.   - Skin barrier cream as needed  - Nursing to monitor skin Qshift    # Diet:  - Diet Consistency/Modifications: regular thins  - Nutrition consult for evaluation and recommendations    # DVT ppx:  - Lovenox  - no further AC/AP indicated due to IPH   - Last Doppler on 11/24/22    # LABS  CBC BMP 11/30  ---------------  Code Status/Emergency Contact:    Outpatient Follow-up (Specialty/Name of physician):  David Franco (DO)  Neurology; Vascular Neurology  [address] [facility] [address]  [place] [address]  Phone: [phone]  Fax: [phone]  Follow Up Time: 1 week    Abdifatah Molina (DO)  Cardiology; Internal Medicine  [address] [facility], [address]  [place] [address]  Phone: [phone]  Fax: [phone]  Follow Up Time: 2 weeks  --------------  Goals: Safe discharge to home  Estimated Length of Stay: 10-14 days  Rehab Potential: Good  Medical Prognosis: Good  Estimated Disposition: Home with Home Care  ---------------    PRESCREEN COMPARISON:  I have reviewed the prescreen information and I have found no relevant changes between the preadmission screening and my post admission evaluation.    RATIONALE FOR INPATIENT ADMISSION: Patient demonstrates the following:  [X] Medically appropriate for rehabilitation admission  [X] Has attainable rehab goals with an appropriate initial discharge plan  [X]Has rehabilitation potential (expected to make a significant improvement within a reasonable period of time)  [X] Requires close medical management by a rehab physician, rehab nursing care, Hospitalist and comprehensive interdisciplinary team (including PT, OT and/or SLP, Prosthetics and Orthotics)       67y with a PMH HTN and ETOH abuse who presented to [facility] on 11/18/22 s/p unwitnessed fall with confusion and found to have cerebellar IPH and IVH, s/p EVD. Hospital course complicated by UTI, s/p abx. Now admitted to [facility] after for initiation of a multidisciplinary rehab program consisting focused on functional mobility, transfers and ADLs    # cerebellar IPH and IVH  - Head CT 11/25: Evolving left cerebellar hematoma with surrounding lucency as seen on the prior and mass effect on the fourth ventricle. No change in appearance of the hematoma in the left cerebellum as well as trace heme in the right frontal region along the prior shunt tract.  - neuro checks q 12 hours  - discuss toileting schedule with staff, bed and chair alarm, video monitoring for safety  - Start comprehensive rehab program, PT/OT/SLP 3 hours a day, 5 days a week.  - Precautions: falls, cardiac, AMS    # Mood/Cognition/EtohA  - c/w seroquel at bedtime, will monitor and consider d/c as appropriate  - c/w thiamine, folic acid, and multivitamin  - Neuropsychology consult PRN   - social work, recreation therapy    # s/p fall 11/29  - bed and chair alarms.   - toileting program  - continue to reinforce safety, reorient  - Head CT 11/29: Resolving left cerebellar hematoma.Near complete resolution of right frontal lobe hemorrhage.Stable tiny focus of hemorrhage in the right occipital region which may be intraparenchymal or subarachnoid in location.No new intraparenchymal hemorrhage identified. Reviewed with patient and family  - C spine CT 11/29: No fracture or traumatic subluxation.  - Xray shoulder 11/29: no fracture  - Xray pelvis/hip 11/29:  mild degenerative change with no fracture, dislocation or radiographic soft tissue abnormality.    # HTN  - amlodipine 10 mg  - lisinopril 40 mg  - hospitalist consult, monitor vitals  - 152/84 - 178/96) 11/30. add hydralazine 25 bid 11/30, discussed with hosptialist    # Sleep:   - Maintain quiet hours and low stim environment.  - comfort measures    # Pain Management:  - Tylenol PRN    # GI/Bowel:  - At risk for constipation due to neurologic diagnosis, immobility and/or medication use  - Senna QHS, Miralax PRN Daily    # /Bladder:   - At risk for incontinence and retention due to neurologic diagnosis and limited mobility  - Currently patient voids independently  - BS once on admission with SC for PVR >400ml  - Encourage timed voids every 4 hours while awake for independence and to promote continence during therapy.    # Skin/Pressure Injury:   - At risk for pressure injury due to neurologic diagnosis and relative immobility.  - Skin assessment on admission: 5 staples scalp. 3 small scabs on sacrum, no surrounding erythema or signs of excoriation.   - Skin barrier cream as needed  - Nursing to monitor skin Qshift    # Diet:  - Diet Consistency/Modifications: regular thins  - Nutrition consult for evaluation and recommendations    # DVT ppx:  - Lovenox  - no further AC/AP indicated due to IPH   - Last Doppler on 11/24/22    # LABS  CBC BMP 11/30  ---------------  Code Status/Emergency Contact:    Outpatient Follow-up (Specialty/Name of physician):  David Franco (DO)  Neurology; Vascular Neurology  [address] [facility] [address]  [place] [address]  Phone: [phone]  Fax: [phone]  Follow Up Time: 1 week    Abdifatah Molina (DO)  Cardiology; Internal Medicine  [address] [facility] [address]  [place] [address]  Phone: [phone]  Fax: [phone]  Follow Up Time: 2 weeks  --------------  Goals: Safe discharge to home  Estimated Length of Stay: 14-17 days  Rehab Potential: Good-  Medical Prognosis: Good  Estimated Disposition: Home with Home Care and 24 hour supervision   ---------------    PRESCREEN COMPARISON:  I have reviewed the prescreen information and I have found no relevant changes between the preadmission screening and my post admission evaluation.    RATIONALE FOR INPATIENT ADMISSION: Patient demonstrates the following:  [X] Medically appropriate for rehabilitation admission  [X] Has attainable rehab goals with an appropriate initial discharge plan  [X]Has rehabilitation potential (expected to make a significant improvement within a reasonable period of time)  [X] Requires close medical management by a rehab physician, rehab nursing care, Hospitalist and comprehensive interdisciplinary team (including PT, OT and/or SLP, Prosthetics and Orthotics)       67y with a PMH HTN and ETOH abuse who presented to [facility] on 11/18/22 s/p unwitnessed fall with confusion and found to have cerebellar IPH and IVH, s/p EVD. Hospital course complicated by UTI, s/p abx. Now admitted to [facility] after for initiation of a multidisciplinary rehab program consisting focused on functional mobility, transfers and ADLs    # non traumatic left cerebellar IPH and IVH  - Head CT 11/25: Evolving left cerebellar hematoma with surrounding lucency as seen on the prior and mass effect on the fourth ventricle. No change in appearance of the hematoma in the left cerebellum as well as trace heme in the right frontal region along the prior shunt tract.  - neuro checks q 12 hours  - discuss toileting schedule with staff, bed and chair alarm, video monitoring for safety  - Start comprehensive rehab program, PT/OT/SLP 3 hours a day, 5 days a week.  - Precautions: falls, cardiac, AMS    # Mood/Cognition/EtohA  - c/w seroquel at bedtime, will monitor and consider d/c as appropriate  - c/w thiamine, folic acid, and multivitamin  - Neuropsychology consult PRN   - social work, recreation therapy    # s/p fall 11/29  - bed and chair alarms.   - toileting program  - continue to reinforce safety, reorient  - Head CT 11/29: Resolving left cerebellar hematoma.Near complete resolution of right frontal lobe hemorrhage.Stable tiny focus of hemorrhage in the right occipital region which may be intraparenchymal or subarachnoid in location.No new intraparenchymal hemorrhage identified. Reviewed with patient and family  - C spine CT 11/29: No fracture or traumatic subluxation.  - Xray shoulder 11/29: no fracture  - Xray pelvis/hip 11/29:  mild degenerative change with no fracture, dislocation or radiographic soft tissue abnormality.    # HTN  - amlodipine 10 mg  - lisinopril 40 mg  - hospitalist consult, monitor vitals  - 152/84 - 178/96) 11/30. add hydralazine 25 bid 11/30, discussed with hosptialist    # Sleep:   - Maintain quiet hours and low stim environment.  - comfort measures    # Pain Management:  - Tylenol PRN    # GI/Bowel:  - At risk for constipation due to neurologic diagnosis, immobility and/or medication use  - Senna QHS, Miralax PRN Daily    # /Bladder:   - At risk for incontinence and retention due to neurologic diagnosis and limited mobility  - Currently patient voids independently  - BS once on admission with SC for PVR >400ml  - Encourage timed voids every 4 hours while awake for independence and to promote continence during therapy.    # Skin/Pressure Injury:   - At risk for pressure injury due to neurologic diagnosis and relative immobility.  - Skin assessment on admission: 5 staples scalp. 3 small scabs on sacrum, no surrounding erythema or signs of excoriation.   - Skin barrier cream as needed  - Nursing to monitor skin Qshift    # Diet:  - Diet Consistency/Modifications: regular thins  - Nutrition consult for evaluation and recommendations    # DVT ppx:  - Lovenox  - no further AC/AP indicated due to IPH   - Last Doppler on 11/24/22    # LABS  CBC BMP 11/30  ---------------  Code Status/Emergency Contact:    Outpatient Follow-up (Specialty/Name of physician):  David Franco (DO)  Neurology; Vascular Neurology  [address] [facility] [facility]  [place] [address]  Phone: [phone]  Fax: [phone]  Follow Up Time: 1 week    Abdifatah Molina (DO)  Cardiology; Internal Medicine  [address] [facility] [address] [facility]  [place] [address]  Phone: [phone]  Fax: [phone]  Follow Up Time: 2 weeks  --------------  Goals: Safe discharge to home  Estimated Length of Stay: 14-17 days  Rehab Potential: Good-  Medical Prognosis: Good  Estimated Disposition: Home with Home Care and 24 hour supervision   ---------------    PRESCREEN COMPARISON:  I have reviewed the prescreen information and I have found no relevant changes between the preadmission screening and my post admission evaluation.    RATIONALE FOR INPATIENT ADMISSION: Patient demonstrates the following:  [X] Medically appropriate for rehabilitation admission  [X] Has attainable rehab goals with an appropriate initial discharge plan  [X]Has rehabilitation potential (expected to make a significant improvement within a reasonable period of time)  [X] Requires close medical management by a rehab physician, rehab nursing care, Hospitalist and comprehensive interdisciplinary team (including PT, OT and/or SLP, Prosthetics and Orthotics)       67y with a PMH HTN and ETOH abuse who presented to [facility] on 11/18/22 s/p unwitnessed fall with confusion and found to have cerebellar IPH and IVH, s/p EVD. Hospital course complicated by UTI, s/p abx. Now admitted to [facility] after for initiation of a multidisciplinary rehab program consisting focused on functional mobility, transfers and ADLs    # traumatic left cerebellar IPH and IVH s/p fall  - Head CT 11/25: Evolving left cerebellar hematoma with surrounding lucency as seen on the prior and mass effect on the fourth ventricle. No change in appearance of the hematoma in the left cerebellum as well as trace heme in the right frontal region along the prior shunt tract.  - neuro checks q 12 hours  - discuss toileting schedule with staff, bed and chair alarm, video monitoring for safety  - Start comprehensive rehab program, PT/OT/SLP 3 hours a day, 5 days a week.  - Precautions: falls, cardiac, AMS    # Mood/Cognition/EtohA  - c/w seroquel at bedtime, will monitor and consider d/c as appropriate  - c/w thiamine, folic acid, and multivitamin  - Neuropsychology consult PRN   - social work, recreation therapy    # s/p fall 11/29  - bed and chair alarms.   - toileting program  - continue to reinforce safety, reorient  - Head CT 11/29: Resolving left cerebellar hematoma.Near complete resolution of right frontal lobe hemorrhage.Stable tiny focus of hemorrhage in the right occipital region which may be intraparenchymal or subarachnoid in location.No new intraparenchymal hemorrhage identified. Reviewed with patient and family  - C spine CT 11/29: No fracture or traumatic subluxation.  - Xray shoulder 11/29: no fracture  - Xray pelvis/hip 11/29:  mild degenerative change with no fracture, dislocation or radiographic soft tissue abnormality.    # HTN  - amlodipine 10 mg  - lisinopril 40 mg  - hospitalist consult, monitor vitals  - 152/84 - 178/96) 11/30. add hydralazine 25 bid 11/30, discussed with hosptialist    # Sleep:   - Maintain quiet hours and low stim environment.  - comfort measures    # Pain Management:  - Tylenol PRN    # GI/Bowel:  - At risk for constipation due to neurologic diagnosis, immobility and/or medication use  - Senna QHS, Miralax PRN Daily    # /Bladder:   - At risk for incontinence and retention due to neurologic diagnosis and limited mobility  - Currently patient voids independently  - BS once on admission with SC for PVR >400ml  - Encourage timed voids every 4 hours while awake for independence and to promote continence during therapy.    # Skin/Pressure Injury:   - At risk for pressure injury due to neurologic diagnosis and relative immobility.  - Skin assessment on admission: 5 staples scalp. 3 small scabs on sacrum, no surrounding erythema or signs of excoriation.   - Skin barrier cream as needed  - Nursing to monitor skin Qshift    # Diet:  - Diet Consistency/Modifications: regular thins  - Nutrition consult for evaluation and recommendations    # DVT ppx:  - Lovenox  - no further AC/AP indicated due to IPH   - Last Doppler on 11/24/22    # LABS  CBC BMP 11/30  ---------------  Code Status/Emergency Contact:    Outpatient Follow-up (Specialty/Name of physician):  David Franco (DO)  Neurology; Vascular Neurology  [address], [address]  [place] [address]  Phone: [phone]  Fax: [phone]  Follow Up Time: 1 week    Abdifatah Molina (DO)  Cardiology; Internal Medicine  [address], [address]  [place] [address]  Phone: [phone]  Fax: [phone]  Follow Up Time: 2 weeks  --------------  Goals: Safe discharge to home  Estimated Length of Stay: 14-17 days  Rehab Potential: Good-  Medical Prognosis: Good  Estimated Disposition: Home with Home Care and 24 hour supervision   ---------------    PRESCREEN COMPARISON:  I have reviewed the prescreen information and I have found no relevant changes between the preadmission screening and my post admission evaluation.    RATIONALE FOR INPATIENT ADMISSION: Patient demonstrates the following:  [X] Medically appropriate for rehabilitation admission  [X] Has attainable rehab goals with an appropriate initial discharge plan  [X]Has rehabilitation potential (expected to make a significant improvement within a reasonable period of time)  [X] Requires close medical management by a rehab physician, rehab nursing care, Hospitalist and comprehensive interdisciplinary team (including PT, OT and/or SLP, Prosthetics and Orthotics)

## 2022-11-29 NOTE — PATIENT PROFILE ADULT - IS PATIENT POST-MENOPAUSAL?
1)  Increase omeprazole from 20 mg once daily (over-the-counter) to 40 mg once daily (30-60 minutes before breakfast) -- Disp: 90 -- Refills: 3.    2)  Continue Entyvio infusions every 8 weeks at [facility].    3)  Labs at your convenience (Dx: Crohn's disease) -- Quantiferon gold, CBC, hepatic panel, ESR, CRP, vitamin A, vitamin E, vitamin D (25-hydroxy), ferritin, iron/TIBC, and vitamin B12.    4)  EGD/Colonoscopy in 10/21.    5)  Follow-up in 1 year.  
yes

## 2022-11-29 NOTE — DISCHARGE NOTE PROVIDER - CARE PROVIDER_API CALL
Patient David Franco (DO)  Neurology; Vascular Neurology  [address] [facility] [address]  [place] [address]  Phone: [phone]  Fax: [phone]  Follow Up Time: 1 week    Abdifatah Molina (DO)  Cardiology; Internal Medicine  [address], [address]  [place] [address]  Phone: [phone]  Fax: [phone]  Follow Up Time: 2 weeks

## 2022-11-29 NOTE — DISCHARGE NOTE PROVIDER - NSDCCPCAREPLAN_GEN_ALL_CORE_FT
PRINCIPAL DISCHARGE DIAGNOSIS  Diagnosis: Intraparenchymal hemorrhage of brain  Assessment and Plan of Treatment: Please follow up with neurologist in 1 week after your discharge from rehab. The office will call you to schedule an appointment, if you do not hear from them please call [phone]. Continue taking medications as prescribed. Monitor your blood pressure. Reduce fat, cholesterol and salt in your diet. Increase intake of fruits and vegetables. Limit alcohol to minimum and do not smoke. You may be at risk for falling, make changes to your home to help you walk easier. Keep up to date on vaccinations.  If you experience any symptoms of facial drooping, slurred speech, arm or leg weakness, severe headache, vision changes or any worsening symptoms, notify provider immediately and return to ER.

## 2022-11-29 NOTE — H&P ADULT - HISTORY OF PRESENT ILLNESS
Diana Yadav is a 67 year old Female with history of hypertension, dyslipidemia, new onset dementia, alcohol abuse (1/2-1 bottle of wine a day) presents after an unwitnessed fall with trauma to the front of the head on 11/17. The next day the patient was found to be confused. 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 No AC/AP. Patient s/p EVD placement 11/18 (removed 11/23). CTA without evidence of vascular malformation Appears to have multiple cavernomas on MRI which are likely etiology of ICH.   Diana Yadav is a 67 year old Female with history of hypertension, dyslipidemia, new onset dementia, alcohol abuse (1/2-1 bottle of wine a day) presents after an unwitnessed fall with trauma to the front of the head on 11/17. The next day the patient was found to be confused. 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 No AC/AP. Patient s/p EVD placement 11/18 (removed 11/23). There were no epileptiform abnormalities recorded x 1 day. CTA without evidence of vascular malformation; appears to have multiple cavernomas on MRI which are likely etiology of ICH. Neurologically stable.   Diana Yadav is a 66yo Female with history of hypertension, dyslipidemia, new onset dementia, alcohol abuse (1/2 - 1 bottle of wine daily) presents after an unwitnessed fall with trauma to the front of the head on 11/17.  Following day the patient was found to be confused. CTH revealed cerebellar IPH with IVH in 4th/3rd ventricle, ventriculomegaly. No AC/AP. Patient s/p EVD placement 11/18 (removed 11/23). No epileptiform activity recorded x1 day. Patient initially on CIWA, did not display signs of withdrawal. CTA without evidence of malformation; appears to have multiple cavernomas on MRI which are likely the etiology of ICH. Neurologically stable. Stay complicated by UTI 11/26, final dose of Ceftriaxone on 11/28. Patient was optimized for d/c to acute rehab on 11/29/22.  Diana Yadav is a 66yo Female PMH HTN, dyslipidemia, new onset dementia, Etoh abuse (1/2 - 1 bottle of wine daily) presents from [facility] 11/18/22 s/p an unwitnessed fall with trauma to the front of the head.  Patient developed increasing confusion the following day.  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 EVD placed 11/18 (removed 11/23).     EEG monitoring without epileptiform activity recorded x1 day. Patient initially placed on CIWA, and did not display signs of withdrawal. CTA without evidence of malformation; MRI+ multiple cavernomas which are likely the etiology of ICH. She was Rx for UTI 11/26, with final dose of Ceftriaxone on 11/28.     Patient was optimized for d/c to acute rehab on 11/29/22.

## 2022-11-29 NOTE — PROGRESS NOTE ADULT - PROBLEM SELECTOR PLAN 2
Left cerebellar hemorrhage, intraventricular hemorrhage, hydrocephalus S/P EVD    - Etiology includes uncontrolled hypertension versus vascular lesion (Appears to have multiple cavernomas on MRI which are likely etiology of ICH)      - s/p EVD removal on 11/23  TTE - EF >80%, hyperdynamic LV sys fx  monitor on tele  neuro checks  management as per stroke team

## 2022-11-30 DIAGNOSIS — S06.37AA: ICD-10-CM

## 2022-11-30 LAB
ANION GAP SERPL CALC-SCNC: 12 MMOL/L — SIGNIFICANT CHANGE UP (ref 5–17)
BUN SERPL-MCNC: 12 MG/DL — SIGNIFICANT CHANGE UP (ref 7–23)
CALCIUM SERPL-MCNC: 9.2 MG/DL — SIGNIFICANT CHANGE UP (ref 8.4–10.5)
CHLORIDE SERPL-SCNC: 104 MMOL/L — SIGNIFICANT CHANGE UP (ref 96–108)
CO2 SERPL-SCNC: 23 MMOL/L — SIGNIFICANT CHANGE UP (ref 22–31)
CREAT SERPL-MCNC: 0.71 MG/DL — SIGNIFICANT CHANGE UP (ref 0.5–1.3)
EGFR: 94 ML/MIN/1.73M2 — SIGNIFICANT CHANGE UP
GLUCOSE SERPL-MCNC: 112 MG/DL — HIGH (ref 70–99)
HCT VFR BLD CALC: 40.2 % — SIGNIFICANT CHANGE UP (ref 34.5–45)
HGB BLD-MCNC: 13.6 G/DL — SIGNIFICANT CHANGE UP (ref 11.5–15.5)
MCHC RBC-ENTMCNC: 31.9 PG — SIGNIFICANT CHANGE UP (ref 27–34)
MCHC RBC-ENTMCNC: 33.8 GM/DL — SIGNIFICANT CHANGE UP (ref 32–36)
MCV RBC AUTO: 94.4 FL — SIGNIFICANT CHANGE UP (ref 80–100)
NRBC # BLD: 0 /100 WBCS — SIGNIFICANT CHANGE UP (ref 0–0)
PLATELET # BLD AUTO: 412 K/UL — HIGH (ref 150–400)
POTASSIUM SERPL-MCNC: 4 MMOL/L — SIGNIFICANT CHANGE UP (ref 3.5–5.3)
POTASSIUM SERPL-SCNC: 4 MMOL/L — SIGNIFICANT CHANGE UP (ref 3.5–5.3)
RBC # BLD: 4.26 M/UL — SIGNIFICANT CHANGE UP (ref 3.8–5.2)
RBC # FLD: 11.8 % — SIGNIFICANT CHANGE UP (ref 10.3–14.5)
SODIUM SERPL-SCNC: 139 MMOL/L — SIGNIFICANT CHANGE UP (ref 135–145)
WBC # BLD: 7.7 K/UL — SIGNIFICANT CHANGE UP (ref 3.8–10.5)
WBC # FLD AUTO: 7.7 K/UL — SIGNIFICANT CHANGE UP (ref 3.8–10.5)

## 2022-11-30 PROCEDURE — 99223 1ST HOSP IP/OBS HIGH 75: CPT

## 2022-11-30 PROCEDURE — 96116 NUBHVL XM PHYS/QHP 1ST HR: CPT

## 2022-11-30 RX ORDER — HYDRALAZINE HCL 50 MG
25 TABLET ORAL
Refills: 0 | Status: DISCONTINUED | OUTPATIENT
Start: 2022-11-30 | End: 2022-12-07

## 2022-11-30 RX ADMIN — Medication 650 MILLIGRAM(S): at 06:29

## 2022-11-30 RX ADMIN — Medication 1 MILLIGRAM(S): at 12:43

## 2022-11-30 RX ADMIN — QUETIAPINE FUMARATE 12.5 MILLIGRAM(S): 200 TABLET, FILM COATED ORAL at 21:05

## 2022-11-30 RX ADMIN — Medication 100 MILLIGRAM(S): at 12:42

## 2022-11-30 RX ADMIN — Medication 25 MILLIGRAM(S): at 17:57

## 2022-11-30 RX ADMIN — POLYETHYLENE GLYCOL 3350 17 GRAM(S): 17 POWDER, FOR SOLUTION ORAL at 17:57

## 2022-11-30 RX ADMIN — SENNA PLUS 2 TABLET(S): 8.6 TABLET ORAL at 21:05

## 2022-11-30 RX ADMIN — LISINOPRIL 40 MILLIGRAM(S): 2.5 TABLET ORAL at 06:11

## 2022-11-30 RX ADMIN — AMLODIPINE BESYLATE 10 MILLIGRAM(S): 2.5 TABLET ORAL at 06:11

## 2022-11-30 RX ADMIN — Medication 25 MILLIGRAM(S): at 13:51

## 2022-11-30 RX ADMIN — Medication 1 TABLET(S): at 12:42

## 2022-11-30 RX ADMIN — POLYETHYLENE GLYCOL 3350 17 GRAM(S): 17 POWDER, FOR SOLUTION ORAL at 06:15

## 2022-11-30 RX ADMIN — ENOXAPARIN SODIUM 40 MILLIGRAM(S): 100 INJECTION SUBCUTANEOUS at 17:57

## 2022-11-30 NOTE — CONSULT NOTE ADULT - ASSESSMENT
67 year old F PMH HTN and ETOH abuse who presented to [facility] on 11/18/22 s/p unwitnessed fall with confusion and found to have cerebellar IPH and IVH, s/p EVD now admitted to  rehab for ADL impairment     #cerebellar IPH and IVH  - Head CT 11/25: Evolving left cerebellar hematoma with surrounding lucency as seen on the prior and mass effect on the fourth ventricle. No change in appearance of the hematoma in the left cerebellum as well as trace heme in the right frontal region along the prior shunt tract  - neuro check q12 hours  - could be in setting of uncontrolled HTN vs vascular lesion (MRI showed multiple cavernomas)  - will need repeat MRI between 12/16/12/30 once hemorrhage resorbed to r/o malformation  - start comprehensive rehab program    #EtOH abuse  - patient's family admits to 10 glasses wine a night  - as per daughter, required medications as she was in EtOH withdrawal and had tremors  - continue with seroquel qhs and q6hrs PRN  - continue thiamine, folic acid, and multivitamin    #HTN  - continue amlodipine 10mg and lisinopril 40mg  - SBP goal <140    #DVT ppx  - Lovenox  - no further AC/AP indicated due to IPH   - Last Doppler on 11/24/22 67 year old F PMH HTN and ETOH abuse who presented to [facility] on 11/18/22 s/p unwitnessed fall with confusion and found to have cerebellar IPH and IVH, s/p EVD now admitted to  rehab for ADL impairment     #cerebellar IPH and IVH  - Head CT 11/25: Evolving left cerebellar hematoma with surrounding lucency as seen on the prior and mass effect on the fourth ventricle. No change in appearance of the hematoma in the left cerebellum as well as trace heme in the right frontal region along the prior shunt tract  - neuro check q12 hours  - could be in setting of uncontrolled HTN vs vascular lesion (MRI showed multiple cavernomas)  - will need repeat MRI between 12/16/12/30 once hemorrhage resorbed to r/o malformation  - start comprehensive rehab program    #EtOH abuse  - patient's family admits to 10 glasses wine a night  - as per daughter, required medications as she was in EtOH withdrawal and had tremors  - continue with seroquel qhs and q6hrs PRN  - continue thiamine, folic acid, and multivitamin    #HTN  - continue amlodipine 10mg and lisinopril 40mg  - will start on hydralazine 25mg BID due to elevated BP readings  - SBP goal <140    #DVT ppx  - Lovenox  - no further AC/AP indicated due to IPH   - Last Doppler on 11/24/22

## 2022-11-30 NOTE — DIETITIAN INITIAL EVALUATION ADULT - PERTINENT MEDS FT
MEDICATIONS  (STANDING):  amLODIPine   Tablet 10 milliGRAM(s) Oral daily  enoxaparin Injectable 40 milliGRAM(s) SubCutaneous <User Schedule>  folic acid 1 milliGRAM(s) Oral daily  influenza  Vaccine (HIGH DOSE) 0.7 milliLiter(s) IntraMuscular once  lisinopril 40 milliGRAM(s) Oral daily  multivitamin 1 Tablet(s) Oral daily  polyethylene glycol 3350 17 Gram(s) Oral two times a day  QUEtiapine 12.5 milliGRAM(s) Oral at bedtime  senna 2 Tablet(s) Oral at bedtime  thiamine 100 milliGRAM(s) Oral daily    MEDICATIONS  (PRN):  acetaminophen     Tablet .. 650 milliGRAM(s) Oral every 6 hours PRN Mild Pain (1 - 3)  QUEtiapine 12.5 milliGRAM(s) Oral every 6 hours PRN agitation

## 2022-11-30 NOTE — CHART NOTE - NSCHARTNOTEFT_GEN_A_CORE
REHABILITATION DIAGNOSIS:  traumatic cerebellar hemorrhage        COMORBIDITIES/COMPLICATING CONDITIONS IMPACTING REHABILITATION:  HEALTH ISSUES - PROBLEM Dx:    right frontal cerebral contusion  IVH  cognitive impairment  uncontrolled HTN  s/p fall  scalp laceration    PAST MEDICAL & SURGICAL HISTORY:  UTI  ETohA    Based upon consideration of the patient's impairments, functional status, complicating conditions and any other contributing factors and after information garnered from the assessments of all therapy disciplines involved in treating the patient and other pertinent clinicians:    INTERDISCIPLINARY REHABILITATION INTERVENTIONS:    [  x ] Transfer Training  [ x  ] Bed Mobility  [ x  ] Therapeutic Exercise  [ x  ] Balance/Coordination Exercises  [ x  ] Locomotion training  [ x  ] Stairs  [  x ] Functional transfers  [  x ] Activities of daily living  [  x ] Visual Perceptual training    [  x ] Bowel/Bladder program  [  x ] Pain Management  [ x  ] Skin/Wound Care    [x   ] Speech/Communication Exercise  [ x  ] Swallowing Exercises    [  x ] Cognitive Exercises  [ x  ] Cognitive-linguistic Treatment  [ x  ] Behavioral Program    [ x  ] Patient/Family Counseling  [ x  ] Family Training  [   ] Community Re-entry  [   ] Orthotic Evaluation/Training  [   ] Prosthetic Eval/Training    MEDICAL PROGNOSIS:  good-    REHAB POTENTIAL:  good-  EXPECTED DAILY THERAPIES:    [  x ] PT  [ x  ] OT  [ x  ] ST    EXPECTED INTENSITY OF PROGRAM:  3 hours daily    EXPECTED FREQUENCY OF PROGRAM:  5 x week    ESTIMATED LOS:  14 days    ESTIMATED DISPOSITION:  home with supervision caregivers and home Pt, OT SLP    INTERDISCIPLINARY FUNCTIONAL OUTCOMES/GOALS:         Gait/Mobility: supervision/CG       Transfers: supervision/CG       ADLs: supervision/CG       Functional Transfers: Cg/min assist       Medication Management: supervision       Communication: supervision       Cognitive: supervision iADLs       Nutrition: regular solid thin liquids       Bladder: continent       Bowel: continent

## 2022-11-30 NOTE — DIETITIAN INITIAL EVALUATION ADULT - NS FNS DIET ORDER
Diet, Regular:   Easy to Chew (EASYTOCHEW)  Supplement Feeding Modality:  Oral  Ensure Enlive Cans or Servings Per Day:  1       Frequency:  Three Times a day (11-29-22 @ 13:40)

## 2022-11-30 NOTE — DIETITIAN INITIAL EVALUATION ADULT - ENERGY INTAKE
Fair (50-75%) Pt's son states pt intake over the last 2 days has increased. She has consumed ~% of meal tray. Pt consumes ~ 1-2 of the Ensure oral supplements daily.

## 2022-11-30 NOTE — DIETITIAN INITIAL EVALUATION ADULT - OTHER INFO
Educated pt's family on importance of consuming food first before supplements. Pt is aware of pt's HLD and avoids fried foods, increased salt and fat intake. Most fodos are grilled or boiled. Appetite has been increasing over the last two days as per pt's family.

## 2022-11-30 NOTE — DIETITIAN INITIAL EVALUATION ADULT - ORAL INTAKE PTA/DIET HISTORY
Pt was lethargic at time was not able to interview. Spoke with family. Family states pt is not a big eater PTA. Usually finishes around 50~ of her meals.

## 2022-11-30 NOTE — DIETITIAN INITIAL EVALUATION ADULT - REASON FOR ADMISSION
Contusion, laceration, and hemorrhage of cerebellum with loss of consciousness status unknown, initial encounter

## 2022-11-30 NOTE — DIETITIAN INITIAL EVALUATION ADULT - NSFNSADHERENCEPTAFT_GEN_A_CORE
Pt's  typically cooks for her. Pt's  states cooking eggs, soups, lots of vegetables, chicken & rice. Pt's family reports being conscious of salt and fat intake due to pt's history of HTN & HLD.

## 2022-11-30 NOTE — CONSULT NOTE ADULT - ASSESSMENT
Assessment:    FIM scores: Social Interaction ; Problem Solving ; Memory .  Plan: Individual supportive psychotherapy to monitor cognition, affect/mood, and behavior. Cognitive remediation during speech therapy sessions. Participation in recreation/art therapy in order to have pleasure and mastery experiences and regain/reestablish a sense of routine.  Time spent with Pt,  minutes.   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 She exhibits difficulty with reality orientation, concentration/working memory, short-term memory for auditory-verbal material with loss of information overtime and limited response to cueing, visuospatial skills (poor visuomotor integration) and aspects of language (i.e., naming, fluency, reading) and executive functions (including organizational skills, initiation and problem solving). Pt's affect is labile, and she reports just a few physical symptoms. Pt appears to have poor self-awareness and exhibits paranoid thinking when frustrated. FIM scores: Social Interaction 4; Problem Solving 4; Memory 4.  Plan: Individual supportive psychotherapy to monitor cognition, affect/mood, and behavior. Continue with antipsychotic medication. Cognitive remediation during speech therapy sessions is strongly recommended. Participation in recreation/art therapy in order to have pleasure and mastery experiences and regain/reestablish a sense of routine.  Time spent with Pt, 50 minutes.

## 2022-11-30 NOTE — DIETITIAN INITIAL EVALUATION ADULT - PERTINENT LABORATORY DATA
11-30    139  |  104  |  12  ----------------------------<  112<H>  4.0   |  23  |  0.71    Ca    9.2      30 Nov 2022 05:50    A1C with Estimated Average Glucose Result: 5.6 % (11-19-22 @ 04:26)

## 2022-11-30 NOTE — CONSULT NOTE ADULT - SUBJECTIVE AND OBJECTIVE BOX
Pt is a 66 y/o right-handed female with PMHx of HTN, dyslipidemia, new onset dementia, Etoh abuse (1/2 - 1 bottle of wine daily) presents from [facility] 11/18/22 s/p an unwitnessed fall with trauma to the front of the head.  Pt developed increasing confusion the following day.  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 EVD placed 11/18 (removed 11/23). EEG monitoring without epileptiform activity recorded x1 day. Pt initially placed on CIWA, and did not display signs of withdrawal. CTA without evidence of malformation; MRI+ multiple cavernomas which are likely the etiology of ICH. She was Rx for UTI 11/26, with final dose of Ceftriaxone on 11/28. Pt was optimized for d/c to acute rehab on 11/29/22. PMHx:   . Current meds: Please see list in Pt’s chart. Social Hx:   Findings: Pt was seen for an initial assessment of his/her cognitive and emotional functioning. The Modified MMSE (3MS) was administered; Pt’s results (/100) were in the range. His/Her scores in geriatric mood measures suggested levels of anxiety and depression (GAD7 = /21; PHQ9 = /27; GAS = /30; GDS =  /15). Pt was alert,  Ox3, and cooperative.  Attn/Conc: Simple auditory attention - .  Concentration/Working memory for reversed counting and spelling – (/7). Language: Pt’s speech was of  . Naming - . Sentence repetition - . Auditory Comprehension - . Reading - . Writing - . Memory: Encoding of 3 words was (/3); short-delayed verbal recall – (/3, improving to /3 with cueing); long-delayed verbal recall – (/3, improving to /3 with cueing). LTM was for US presidents (/4, improving to /4 with cueing). Visual memory –. Visuospatial: Visuomotor integration – for copy of a 2D figure, was noted. Executive Functions: Motor Planning - . Organizational skills - . Abstract reasoning - . Initiation/Phonemic Fluency - .Verbal problem solving – .   Emotional functioning: Affect - 	. Mood - ; Pt reported experiencing . On mood measures s/he additionally reported .  Thought processes were.  No abnormal thought contents were observed.  Pt denied any AH/VH. Pt also denied SI/HI/I/P. Insight - WFL. Judgment - fair.     Pt is a 66 y/o right-handed female with PMHx of HTN, dyslipidemia, new onset dementia, Etoh abuse (1/2 - 1 bottle of wine daily) presents from [facility] 22 s/p an unwitnessed fall with trauma to the front of the head.  Pt developed increasing confusion the following day.  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 EVD placed  (removed ). EEG monitoring without epileptiform activity recorded x1 day. Pt initially placed on CIWA, and did not display signs of withdrawal. CTA without evidence of malformation; MRI+ multiple cavernomas which are likely the etiology of ICH. She was Rx for UTI , with final dose of Ceftriaxone on . Pt was optimized for d/c to acute rehab on 22. PMHx: As noted above. Current meds: Please see list in Pt’s chart. Social Hx: Pt is  and has two children. She completed two years of college and has worked as a  and . Pt lacks hx of mental illness. She denied hx of substance abuse; she reported drinking an occasional glass of wine. Pt is Jew. She enjoys spending time with her family, dining out and going to the gym.   Findings: Pt was seen for an initial assessment of her cognitive and emotional functioning. The evaluation was conducted in both English and Palestinian as the former is Pt's native language. The Modified MMSE (3MS) was administered; Pt’s results (55/100) were in the Severely Impaired range. Her scores in mood measures suggested levels of anxiety and depression (GAD7 = /21; PHQ9 = /27). Pt was alert, Ox2 (person except for age and , season, state and county), and cooperative. Attn/Conc: Simple auditory attention - intact.  Concentration/Working memory for reversed counting and spelling – moderately impaired (4/7, she was unable to spell a 5-letter word beyond the 2nd letter in Palestinian, and performed worse in English). Language: Pt’s speech was of normal volume, pitch and pace, however, at the beginning was almost whispering because she did not want others to hear her speaking in Palestinian. Naming - mildly impaired (3/5 body part in Palestinian and English). Sentence repetition - intact. Auditory Comprehension - intact. Reading - mildly impaired (2/3); Pt was able to read but not to follow a simple written command. Writing - intact. Memory: Encoding of 3 words was intact (3/3); short-delayed verbal recall – mildly impaired (2/3, without improvement with cueing); long-delayed verbal recall – significantly impaired (0/3, improving to 1/3 with saturated cueing). LTM was moderately impaired for US presidents (1/4, improving to 4/4 with cueing). Visual memory – impaired. Visuospatial: Visuomotor integration – moderately impaired for copy of a 2D figure (6/10), poor integration and distortion were noted. Executive Functions: Motor Planning - intact. Organizational skills - moderately impaired. Abstract reasoning - intact. Initiation/Phonemic Fluency - moderately impaired (4/10). Verbal problem solving – mildly to moderately impaired. Emotional functioning: Affect - labile (euthymic to irritable), she became increasingly irritable and suspicious over the course of the session. Mood -  euthymic ("good"); Pt reported experiencing sleep and appetite loss, and low energy. On mood measures she additionally reported .  Thought processes were goal-directed.  Paranoid thinking and lack of self-awareness were noted.  Pt denied any AH/VH. Pt also denied SI/HI/I/P. Insight - poor. Judgment - fair.

## 2022-11-30 NOTE — CONSULT NOTE ADULT - SUBJECTIVE AND OBJECTIVE BOX
Patient is a 67y old  Female who presents with a chief complaint of cerebellar IPH with IVH (29 Nov 2022 12:39)    HPI:  Diana Yadav is a 66yo Female PMH HTN, dyslipidemia, new onset dementia, Etoh abuse (1/2 - 1 bottle of wine daily) presents from [facility] 11/18/22 s/p an unwitnessed fall with trauma to the front of the head.  Patient developed increasing confusion the following day.  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 EVD placed 11/18 (removed 11/23).     EEG monitoring without epileptiform activity recorded x1 day. Patient initially placed on CIWA, and did not display signs of withdrawal. CTA without evidence of malformation; MRI+ multiple cavernomas which are likely the etiology of ICH. She was Rx for UTI 11/26, with final dose of Ceftriaxone on 11/28.     Patient was optimized for d/c to acute rehab on 11/29/22.  (29 Nov 2022 12:39)    Subjective History:  Patient seen and examined at bedside, sitting in chair with daughter at bedside. Overnight patient had fall and landed on L side and had CT head and c-spine performed. Patient states that she does not have pain on L side at this time, able to move neck as well without difficulty but does have some bruising on corner of L eye. Patient otherwise denies any acute complaints, no chest pain, headache, changes in vision or SOB.    PAST MEDICAL & SURGICAL HISTORY:  HTN  HLD  EtOH abuse  dementia    SOCIAL HISTORY:  Lives at home with  and children  As per daughter, patient has multiple glasses of wine a night (patient denies EtOH use or occasional)  Denies smoking or illicit drug use  Independent with ambulation and ADLs prior to hospitalization    FAMILY HISTORY:  denies history of CVA    ALLERGIES:  No Known Allergies    MEDICATIONS  (STANDING):  amLODIPine   Tablet 10 milliGRAM(s) Oral daily  enoxaparin Injectable 40 milliGRAM(s) SubCutaneous <User Schedule>  folic acid 1 milliGRAM(s) Oral daily  influenza  Vaccine (HIGH DOSE) 0.7 milliLiter(s) IntraMuscular once  lisinopril 40 milliGRAM(s) Oral daily  multivitamin 1 Tablet(s) Oral daily  polyethylene glycol 3350 17 Gram(s) Oral two times a day  QUEtiapine 12.5 milliGRAM(s) Oral at bedtime  senna 2 Tablet(s) Oral at bedtime  thiamine 100 milliGRAM(s) Oral daily    MEDICATIONS  (PRN):  acetaminophen     Tablet .. 650 milliGRAM(s) Oral every 6 hours PRN Mild Pain (1 - 3)  QUEtiapine 12.5 milliGRAM(s) Oral every 6 hours PRN agitation    Review of Systems:   CONSTITUTIONAL: denies fever, chills, fatigue, weakness  HEENT: denies blurred vision, sore throat, admits to some bruising on corner of L eye, pain when she touches area  CARDIOVASCULAR: denies chest pain, chest pressure, palpitations  RESPIRATORY: denies shortness of breath, sputum production  GASTROINTESTINAL: denies nausea, vomiting, diarrhea, abdominal pain  GENITOURINARY: denies dysuria, discharge  NEUROLOGICAL: denies numbness, headache  MUSCULOSKELETAL: denies new joint pain, muscle aches  HEMATOLOGIC: denies gross bleeding, bruising  LYMPHATICS: denies enlarged lymph nodes, extremity swelling  PSYCHIATRIC: denies recent changes in anxiety, depression    Vital Signs Last 24 Hrs  T(F): 98.7 (30 Nov 2022 07:57), Max: 98.7 (29 Nov 2022 14:18)  HR: 79 (30 Nov 2022 07:57) (73 - 93)  BP: 155/91 (30 Nov 2022 07:57) (152/84 - 178/96)  RR: 16 (30 Nov 2022 07:57) (16 - 18)  SpO2: 96% (30 Nov 2022 07:57) (95% - 100%)  I&O's Summary    PHYSICAL EXAM:  GENERAL: NAD, sitting in chair, slightly confused but follows commands  EYES: PERRL, conjunctiva and sclera clear, ecchymosis on lateral corner of L eye  ENMT: Moist mucous membranes, Good dentition  NECK: Supple, No JVD  CHEST/LUNG: Clear to auscultation bilaterally, non-labored breathing, good air entry  HEART: RRR; S1/S2, No murmur  ABDOMEN: Soft, Nontender, Nondistended; Bowel sounds present  VASCULAR: Normal pulses, Normal capillary refill  EXTREMITIES: No cyanosis, No edema, 5/5 strength in all extremities  SKIN: Warm, perfused  PSYCH: Normal mood and affect  NERVOUS SYSTEM:  A/O x2 (name, hospital, but not date), moves all extremities    LABS:                        13.6   7.70  )-----------( 412      ( 30 Nov 2022 05:50 )             40.2     11-30    139  |  104  |  12  ----------------------------<  112  4.0   |  23  |  0.71    Ca    9.2      30 Nov 2022 05:50                                        Culture - Urine (collected 26 Nov 2022 06:31)  Source: Clean Catch Clean Catch (Midstream)  Final Report (28 Nov 2022 16:48):    >100,000 CFU/ml Morganella morganii    <10,000 CFU/ml Normal Urogenital cristiana present  Organism: Morganella morganii (28 Nov 2022 16:48)  Organism: Morganella morganii (28 Nov 2022 16:48)      -  Amikacin: S <=16      -  Amoxicillin/Clavulanic Acid: R >16/8      -  Ampicillin: R >16 These ampicillin results predict results for amoxicillin      -  Ampicillin/Sulbactam: R >16/8 Enterobacter, Klebsiella aerogenes, Citrobacter, and Serratia may develop resistance during prolonged therapy (3-4 days)      -  Aztreonam: S <=4      -  Cefazolin: R >16      -  Cefepime: S <=2      -  Cefoxitin: S <=8      -  Ceftriaxone: S <=1 Enterobacter, Klebsiella aerogenes, Citrobacter, and Serratia may develop resistance during prolonged therapy      -  Ciprofloxacin: S <=0.25      -  Ertapenem: S <=0.5      -  Gentamicin: S <=2      -  Imipenem: R 4      -  Levofloxacin: S <=0.5      -  Meropenem: S <=1      -  Nitrofurantoin: R >64 Should not be used to treat pyelonephritis      -  Piperacillin/Tazobactam: S <=8      -  Tobramycin: S <=2      -  Trimethoprim/Sulfamethoxazole: S <=0.5/9.5      Method Type: TIAGO    Culture - Blood (collected 23 Nov 2022 19:03)  Source: .Blood Blood  Final Report (29 Nov 2022 01:01):    No Growth Final    Culture - Blood (collected 23 Nov 2022 19:03)  Source: .Blood Blood  Final Report (29 Nov 2022 02:00):    No Growth Final      COVID-19 PCR: NotDetec (11-29-22 @ 17:45)  COVID-19 PCR: NotDetec (11-27-22 @ 18:38)    RADIOLOGY & ADDITIONAL TESTS:    Care Discussed with Consultants/Other Providers:

## 2022-11-30 NOTE — DIETITIAN INITIAL EVALUATION ADULT - EDUCATION DIETARY MODIFICATIONS
Educated pt's family on importance of increased energy & protein intake./(2) meets goals/outcomes/verbalization

## 2022-12-01 LAB
ALBUMIN SERPL ELPH-MCNC: 3.3 G/DL — SIGNIFICANT CHANGE UP (ref 3.3–5)
ALP SERPL-CCNC: 85 U/L — SIGNIFICANT CHANGE UP (ref 40–120)
ALT FLD-CCNC: 147 U/L — HIGH (ref 10–45)
ANION GAP SERPL CALC-SCNC: 7 MMOL/L — SIGNIFICANT CHANGE UP (ref 5–17)
AST SERPL-CCNC: 101 U/L — HIGH (ref 10–40)
BASOPHILS # BLD AUTO: 0.03 K/UL — SIGNIFICANT CHANGE UP (ref 0–0.2)
BASOPHILS NFR BLD AUTO: 0.7 % — SIGNIFICANT CHANGE UP (ref 0–2)
BILIRUB SERPL-MCNC: 0.2 MG/DL — SIGNIFICANT CHANGE UP (ref 0.2–1.2)
BUN SERPL-MCNC: 11 MG/DL — SIGNIFICANT CHANGE UP (ref 7–23)
CALCIUM SERPL-MCNC: 9.2 MG/DL — SIGNIFICANT CHANGE UP (ref 8.4–10.5)
CHLORIDE SERPL-SCNC: 102 MMOL/L — SIGNIFICANT CHANGE UP (ref 96–108)
CO2 SERPL-SCNC: 27 MMOL/L — SIGNIFICANT CHANGE UP (ref 22–31)
CREAT SERPL-MCNC: 0.66 MG/DL — SIGNIFICANT CHANGE UP (ref 0.5–1.3)
EGFR: 97 ML/MIN/1.73M2 — SIGNIFICANT CHANGE UP
EOSINOPHIL # BLD AUTO: 0.06 K/UL — SIGNIFICANT CHANGE UP (ref 0–0.5)
EOSINOPHIL NFR BLD AUTO: 1.3 % — SIGNIFICANT CHANGE UP (ref 0–6)
GLUCOSE SERPL-MCNC: 105 MG/DL — HIGH (ref 70–99)
HCT VFR BLD CALC: 38.2 % — SIGNIFICANT CHANGE UP (ref 34.5–45)
HGB BLD-MCNC: 12.8 G/DL — SIGNIFICANT CHANGE UP (ref 11.5–15.5)
IMM GRANULOCYTES NFR BLD AUTO: 0.9 % — SIGNIFICANT CHANGE UP (ref 0–0.9)
LYMPHOCYTES # BLD AUTO: 1.1 K/UL — SIGNIFICANT CHANGE UP (ref 1–3.3)
LYMPHOCYTES # BLD AUTO: 24.6 % — SIGNIFICANT CHANGE UP (ref 13–44)
MCHC RBC-ENTMCNC: 32.4 PG — SIGNIFICANT CHANGE UP (ref 27–34)
MCHC RBC-ENTMCNC: 33.5 GM/DL — SIGNIFICANT CHANGE UP (ref 32–36)
MCV RBC AUTO: 96.7 FL — SIGNIFICANT CHANGE UP (ref 80–100)
MONOCYTES # BLD AUTO: 0.38 K/UL — SIGNIFICANT CHANGE UP (ref 0–0.9)
MONOCYTES NFR BLD AUTO: 8.5 % — SIGNIFICANT CHANGE UP (ref 2–14)
NEUTROPHILS # BLD AUTO: 2.86 K/UL — SIGNIFICANT CHANGE UP (ref 1.8–7.4)
NEUTROPHILS NFR BLD AUTO: 64 % — SIGNIFICANT CHANGE UP (ref 43–77)
NRBC # BLD: 0 /100 WBCS — SIGNIFICANT CHANGE UP (ref 0–0)
PLATELET # BLD AUTO: 360 K/UL — SIGNIFICANT CHANGE UP (ref 150–400)
POTASSIUM SERPL-MCNC: 3.8 MMOL/L — SIGNIFICANT CHANGE UP (ref 3.5–5.3)
POTASSIUM SERPL-SCNC: 3.8 MMOL/L — SIGNIFICANT CHANGE UP (ref 3.5–5.3)
PROT SERPL-MCNC: 7.2 G/DL — SIGNIFICANT CHANGE UP (ref 6–8.3)
RBC # BLD: 3.95 M/UL — SIGNIFICANT CHANGE UP (ref 3.8–5.2)
RBC # FLD: 11.9 % — SIGNIFICANT CHANGE UP (ref 10.3–14.5)
SODIUM SERPL-SCNC: 136 MMOL/L — SIGNIFICANT CHANGE UP (ref 135–145)
WBC # BLD: 4.47 K/UL — SIGNIFICANT CHANGE UP (ref 3.8–10.5)
WBC # FLD AUTO: 4.47 K/UL — SIGNIFICANT CHANGE UP (ref 3.8–10.5)

## 2022-12-01 PROCEDURE — 99232 SBSQ HOSP IP/OBS MODERATE 35: CPT

## 2022-12-01 RX ADMIN — Medication 1 TABLET(S): at 12:03

## 2022-12-01 RX ADMIN — POLYETHYLENE GLYCOL 3350 17 GRAM(S): 17 POWDER, FOR SOLUTION ORAL at 06:15

## 2022-12-01 RX ADMIN — Medication 100 MILLIGRAM(S): at 12:03

## 2022-12-01 RX ADMIN — AMLODIPINE BESYLATE 10 MILLIGRAM(S): 2.5 TABLET ORAL at 06:15

## 2022-12-01 RX ADMIN — Medication 25 MILLIGRAM(S): at 06:15

## 2022-12-01 RX ADMIN — Medication 1 MILLIGRAM(S): at 12:04

## 2022-12-01 RX ADMIN — LISINOPRIL 40 MILLIGRAM(S): 2.5 TABLET ORAL at 06:16

## 2022-12-01 RX ADMIN — QUETIAPINE FUMARATE 12.5 MILLIGRAM(S): 200 TABLET, FILM COATED ORAL at 21:13

## 2022-12-01 RX ADMIN — SENNA PLUS 2 TABLET(S): 8.6 TABLET ORAL at 21:13

## 2022-12-01 RX ADMIN — Medication 25 MILLIGRAM(S): at 18:32

## 2022-12-01 RX ADMIN — ENOXAPARIN SODIUM 40 MILLIGRAM(S): 100 INJECTION SUBCUTANEOUS at 18:29

## 2022-12-01 NOTE — PROGRESS NOTE ADULT - SUBJECTIVE AND OBJECTIVE BOX
Patient is a 67y old  Female who presents with a chief complaint of cerebellar IPH with IVH (2022 13:00)      HPI:  Diana Yadav is a 68yo Female PMH HTN, dyslipidemia, new onset dementia, Etoh abuse (1/2 - 1 bottle of wine daily) presents from [facility] 22 s/p an unwitnessed fall with trauma to the front of the head.  Patient developed increasing confusion the following day.  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 EVD placed  (removed ).     EEG monitoring without epileptiform activity recorded x1 day. Patient initially placed on CIWA, and did not display signs of withdrawal. CTA without evidence of malformation; MRI+ multiple cavernomas which are likely the etiology of ICH. She was Rx for UTI , with final dose of Ceftriaxone on .     Patient was optimized for d/c to acute rehab on 22.  (2022 12:39)      PAST MEDICAL & SURGICAL HISTORY:      MEDICATIONS  (STANDING):  amLODIPine   Tablet 10 milliGRAM(s) Oral daily  enoxaparin Injectable 40 milliGRAM(s) SubCutaneous <User Schedule>  folic acid 1 milliGRAM(s) Oral daily  hydrALAZINE 25 milliGRAM(s) Oral two times a day  influenza  Vaccine (HIGH DOSE) 0.7 milliLiter(s) IntraMuscular once  lisinopril 40 milliGRAM(s) Oral daily  multivitamin 1 Tablet(s) Oral daily  polyethylene glycol 3350 17 Gram(s) Oral two times a day  QUEtiapine 12.5 milliGRAM(s) Oral at bedtime  senna 2 Tablet(s) Oral at bedtime  thiamine 100 milliGRAM(s) Oral daily    MEDICATIONS  (PRN):  acetaminophen     Tablet .. 650 milliGRAM(s) Oral every 6 hours PRN Mild Pain (1 - 3)  QUEtiapine 12.5 milliGRAM(s) Oral every 6 hours PRN agitation      Allergies    No Known Allergies    Intolerances          VITALS  67y  Vital Signs Last 24 Hrs  T(C): 36.8 (01 Dec 2022 07:13), Max: 36.9 (2022 20:21)  T(F): 98.2 (01 Dec 2022 07:13), Max: 98.5 (2022 20:21)  HR: 84 (01 Dec 2022 07:13) (67 - 84)  BP: 144/84 (01 Dec 2022 07:13) (133/76 - 151/88)  BP(mean): --  RR: 16 (01 Dec 2022 07:13) (16 - 16)  SpO2: 98% (01 Dec 2022 07:13) (96% - 98%)    Parameters below as of 01 Dec 2022 07:13  Patient On (Oxygen Delivery Method): room air      Daily     Daily Weight in k.4 (2022 22:47)        RECENT LABS:                          12.8   4.47  )-----------( 360      ( 01 Dec 2022 06:13 )             38.2     12    136  |  102  |  11  ----------------------------<  105<H>  3.8   |  27  |  0.66    Ca    9.2      01 Dec 2022 06:13    TPro  7.2  /  Alb  3.3  /  TBili  0.2  /  DBili  x   /  AST  101<H>  /  ALT  147<H>  /  AlkPhos  85  12-    LIVER FUNCTIONS - ( 01 Dec 2022 06:13 )  Alb: 3.3 g/dL / Pro: 7.2 g/dL / ALK PHOS: 85 U/L / ALT: 147 U/L / AST: 101 U/L / GGT: x                   CAPILLARY BLOOD GLUCOSE

## 2022-12-01 NOTE — PROGRESS NOTE ADULT - SUBJECTIVE AND OBJECTIVE BOX
Patient is a 67y old  Female who presents with a chief complaint of cerebellar IPH with IVH (01 Dec 2022 14:14)      Patient seen and examined at bedside. No events overnight. Patient denies headache, changes in vision, chest pain, sob. Slept well and looking forward to PT. Daughter at bedside states patient was confused in evening yesterday but now back to baseline     ALLERGIES:  No Known Allergies    MEDICATIONS  (STANDING):  amLODIPine   Tablet 10 milliGRAM(s) Oral daily  enoxaparin Injectable 40 milliGRAM(s) SubCutaneous <User Schedule>  folic acid 1 milliGRAM(s) Oral daily  hydrALAZINE 25 milliGRAM(s) Oral two times a day  influenza  Vaccine (HIGH DOSE) 0.7 milliLiter(s) IntraMuscular once  lisinopril 40 milliGRAM(s) Oral daily  multivitamin 1 Tablet(s) Oral daily  polyethylene glycol 3350 17 Gram(s) Oral two times a day  QUEtiapine 12.5 milliGRAM(s) Oral at bedtime  senna 2 Tablet(s) Oral at bedtime  thiamine 100 milliGRAM(s) Oral daily    MEDICATIONS  (PRN):  acetaminophen     Tablet .. 650 milliGRAM(s) Oral every 6 hours PRN Mild Pain (1 - 3)  QUEtiapine 12.5 milliGRAM(s) Oral every 6 hours PRN agitation    Vital Signs Last 24 Hrs  T(F): 98.2 (01 Dec 2022 07:13), Max: 98.5 (30 Nov 2022 20:21)  HR: 84 (01 Dec 2022 07:13) (67 - 84)  BP: 144/84 (01 Dec 2022 07:13) (133/76 - 151/88)  RR: 16 (01 Dec 2022 07:13) (16 - 16)  SpO2: 98% (01 Dec 2022 07:13) (96% - 98%)  I&O's Summary      PHYSICAL EXAM:  GENERAL: NAD, sitting in chair, slightly confused but follows commands  EYES: PERRL, conjunctiva and sclera clear, ecchymosis on lateral corner of L eye  ENMT: Moist mucous membranes, Good dentition  CHEST/LUNG: Clear to auscultation bilaterally, non-labored breathing, good air entry  HEART: RRR; S1/S2, No murmur  ABDOMEN: Soft, Nontender, Nondistended; Bowel sounds present  EXTREMITIES: No cyanosis, No edema, 5/5 strength in all extremities  SKIN: Warm, perfused  NERVOUS SYSTEM:  A/O x2 (name, hospital, but not date), moves all extremities      LABS:                        12.8   4.47  )-----------( 360      ( 01 Dec 2022 06:13 )             38.2     12-01    136  |  102  |  11  ----------------------------<  105  3.8   |  27  |  0.66    Ca    9.2      01 Dec 2022 06:13    TPro  7.2  /  Alb  3.3  /  TBili  0.2  /  DBili  x   /  AST  101  /  ALT  147  /  AlkPhos  85  12-01 11-19 Chol 250 mg/dL LDL -- HDL 64 mg/dL Trig 118 mg/dL                      Culture - Urine (collected 26 Nov 2022 06:31)  Source: Clean Catch Clean Catch (Midstream)  Final Report (28 Nov 2022 16:48):    >100,000 CFU/ml Morganella morganii    <10,000 CFU/ml Normal Urogenital cristiana present  Organism: Morganella morganii (28 Nov 2022 16:48)  Organism: Morganella morganii (28 Nov 2022 16:48)      -  Amikacin: S <=16      -  Amoxicillin/Clavulanic Acid: R >16/8      -  Ampicillin: R >16 These ampicillin results predict results for amoxicillin      -  Ampicillin/Sulbactam: R >16/8 Enterobacter, Klebsiella aerogenes, Citrobacter, and Serratia may develop resistance during prolonged therapy (3-4 days)      -  Aztreonam: S <=4      -  Cefazolin: R >16      -  Cefepime: S <=2      -  Cefoxitin: S <=8      -  Ceftriaxone: S <=1 Enterobacter, Klebsiella aerogenes, Citrobacter, and Serratia may develop resistance during prolonged therapy      -  Ciprofloxacin: S <=0.25      -  Ertapenem: S <=0.5      -  Gentamicin: S <=2      -  Imipenem: R 4      -  Levofloxacin: S <=0.5      -  Meropenem: S <=1      -  Nitrofurantoin: R >64 Should not be used to treat pyelonephritis      -  Piperacillin/Tazobactam: S <=8      -  Tobramycin: S <=2      -  Trimethoprim/Sulfamethoxazole: S <=0.5/9.5      Method Type: TIAGO      COVID-19 PCR: NotDetec (11-29-22 @ 17:45)  COVID-19 PCR: NotDetec (11-27-22 @ 18:38)      RADIOLOGY & ADDITIONAL TESTS:    Care Discussed with Consultants/Other Providers:

## 2022-12-01 NOTE — PROGRESS NOTE ADULT - COMMENTS
Patient seen with daughter at bedside. Had much better night last night, slept through the night without trying to get OOB. Denies any urinary frequency, no constipation. Denies H/A, dizziness, N/V.     Patient still distracted with poor simple attention and recall. did not remember examiner from yesterday (re; name or position, although did state I looked familiar). Reoriented; when asked at end of exam, unable to remember name, even with cues. She initiates question about rehab stay; we discussed progress, barriers to dc, and anticipated LOS 2-3 weeks although would narrow that down as she progressed over next week

## 2022-12-01 NOTE — PROGRESS NOTE ADULT - ASSESSMENT
67y with a PMH HTN and ETOH abuse who presented to [facility] on 11/18/22 s/p unwitnessed fall with confusion and found to have cerebellar IPH and IVH, s/p EVD. Hospital course complicated by UTI, s/p abx. Now admitted to [facility] after for initiation of a multidisciplinary rehab program consisting focused on functional mobility, transfers and ADLs    # traumatic left cerebellar IPH and IVH s/p fall  - Head CT 11/25: Evolving left cerebellar hematoma with surrounding lucency as seen on the prior and mass effect on the fourth ventricle. No change in appearance of the hematoma in the left cerebellum as well as trace heme in the right frontal region along the prior shunt tract.  - neuro checks q 12 hours  - seroquel qhs  - toileting schedule, bed and chair alarm, video monitoring for safety  - continue comprehensive rehab program, PT/OT/SLP 3 hours a day, 5 days a week.  - Precautions: falls, cardiac, AMS    # EtohA  - c/w thiamine, folic acid, and multivitamin  - Neuropsychology consult PRN   - social work, recreation therapy    # s/p fall 11/29  - Head CT 11/29: Resolving left cerebellar hematoma.Near complete resolution of right frontal lobe hemorrhage.Stable tiny focus of hemorrhage in the right occipital region which may be intraparenchymal or subarachnoid in location.No new intraparenchymal hemorrhage identified. Reviewed with patient and family  - C spine CT 11/29: No fracture or traumatic subluxation.  - Xray shoulder 11/29: no fracture  - Xray pelvis/hip 11/29:  mild degenerative change with no fracture, dislocation or radiographic soft tissue abnormality  - bed and chair alarms  - toileting program  - continue to reinforce safety, reorient    # HTN  - amlodipine 10 mg  - lisinopril 40 mg  -  (133/76 - 151/88) 12/1    # Pain Management:  - Tylenol PRN    # GI/Bowel:  - Senna QHS, Miralax PRN Daily    # Diet:  - regular thins    # DVT ppx:  - Lovenox  - Last Doppler on 11/24/22    # LABS 12/1 reviewed, stable    # LABS  CBC BMP 12/5  ---------------  Code Status/Emergency Contact:    Outpatient Follow-up (Specialty/Name of physician):  David Franco (DO)  Neurology; Vascular Neurology  [address] [facility] [address], [address]  [place] [address]  Phone: [phone]  Fax: [phone]  Follow Up Time: 1 week    Abdifatah Molina (DO)  Cardiology; Internal Medicine  [address] [facility], [address]  [place] [address]  Phone: [phone]  Fax: [phone]  Follow Up Time: 2 weeks   67y with a PMH HTN and ETOH abuse who presented to [facility] on 11/18/22 s/p unwitnessed fall with confusion and found to have cerebellar IPH and IVH, s/p EVD. Hospital course complicated by UTI, s/p abx. Now admitted to [facility] after for initiation of a multidisciplinary rehab program consisting focused on functional mobility, transfers and ADLs    # traumatic left cerebellar IPH and IVH s/p fall  - Head CT 11/25: Evolving left cerebellar hematoma with surrounding lucency as seen on the prior and mass effect on the fourth ventricle. No change in appearance of the hematoma in the left cerebellum as well as trace heme in the right frontal region along the prior shunt tract.  - neuro checks q 12 hours  - seroquel qhs. noted by SLP to have alternating laughing/crying, emotional labilty ? Pseudobulbar affect. Will follow, may consider low dose sertaline  - toileting schedule, bed and chair alarm, video monitoring for safety  - continue comprehensive rehab program, PT/OT/SLP 3 hours a day, 5 days a week.  - Precautions: falls, cardiac, AMS    # EtohA  - c/w thiamine, folic acid, and multivitamin  - Neuropsychology consult PRN   - social work, recreation therapy    # s/p fall 11/29  - Head CT 11/29: Resolving left cerebellar hematoma.Near complete resolution of right frontal lobe hemorrhage.Stable tiny focus of hemorrhage in the right occipital region which may be intraparenchymal or subarachnoid in location.No new intraparenchymal hemorrhage identified. Reviewed with patient and family  - C spine CT 11/29: No fracture or traumatic subluxation.  - Xray shoulder 11/29: no fracture  - Xray pelvis/hip 11/29:  mild degenerative change with no fracture, dislocation or radiographic soft tissue abnormality  - bed and chair alarms  - toileting program  - continue to reinforce safety, reorient    # HTN  - amlodipine 10 mg  - lisinopril 40 mg  -  (133/76 - 151/88) 12/1    # Pain Management:  - Tylenol PRN    # GI/Bowel:  - Senna QHS, Miralax PRN Daily    # Diet:  - easy to chew--> upgraded to regular thins 12/1    # DVT ppx:  - Lovenox  - Last Doppler on 11/24/22    # LABS 12/1 reviewed, stable    # LABS  CBC BMP 12/5  ---------------  Code Status/Emergency Contact:    Outpatient Follow-up (Specialty/Name of physician):  David Franco (DO)  Neurology; Vascular Neurology  [address] [facility]  [place] [address]  Phone: [phone]  Fax: [phone]  Follow Up Time: 1 week    Abdifatah Molina (DO)  Cardiology; Internal Medicine  [address] [place] [address], [address]  [place] [address]  Phone: [phone]  Fax: [phone]  Follow Up Time: 2 weeks

## 2022-12-01 NOTE — PROGRESS NOTE ADULT - ASSESSMENT
67 year old F PMH HTN and ETOH abuse who presented to [facility] on 11/18/22 s/p unwitnessed fall with confusion and found to have cerebellar IPH and IVH, s/p EVD now admitted to  rehab for ADL impairment     #cerebellar IPH and IVH  - Head CT 11/25: Evolving left cerebellar hematoma with surrounding lucency as seen on the prior and mass effect on the fourth ventricle. No change in appearance of the hematoma in the left cerebellum as well as trace heme in the right frontal region along the prior shunt tract  - neuro check q12 hours  - could be in setting of uncontrolled HTN vs vascular lesion (MRI showed multiple cavernomas)  - will need repeat MRI between 12/16/12/30 once hemorrhage resorbed to r/o malformation  - continue comprehensive rehab program    #EtOH abuse  - patient's family admits to 10 glasses wine a night  - as per daughter, required medications as she was in EtOH withdrawal and had tremors  - continue with seroquel qhs and q6hrs PRN  - continue thiamine, folic acid, and multivitamin    #HTN  - continue amlodipine 10mg and lisinopril 40mg  - started hydralazine 25mg BID 11/30, continue to monitor  - SBP goal <140    #DVT ppx  - Lovenox  - no further AC/AP indicated due to IPH   - Last Doppler on 11/24/22

## 2022-12-02 PROCEDURE — 99232 SBSQ HOSP IP/OBS MODERATE 35: CPT

## 2022-12-02 RX ADMIN — Medication 100 MILLIGRAM(S): at 14:12

## 2022-12-02 RX ADMIN — POLYETHYLENE GLYCOL 3350 17 GRAM(S): 17 POWDER, FOR SOLUTION ORAL at 18:37

## 2022-12-02 RX ADMIN — AMLODIPINE BESYLATE 10 MILLIGRAM(S): 2.5 TABLET ORAL at 06:10

## 2022-12-02 RX ADMIN — LISINOPRIL 40 MILLIGRAM(S): 2.5 TABLET ORAL at 06:10

## 2022-12-02 RX ADMIN — ENOXAPARIN SODIUM 40 MILLIGRAM(S): 100 INJECTION SUBCUTANEOUS at 18:37

## 2022-12-02 RX ADMIN — Medication 1 MILLIGRAM(S): at 14:12

## 2022-12-02 RX ADMIN — Medication 25 MILLIGRAM(S): at 06:10

## 2022-12-02 RX ADMIN — Medication 1 TABLET(S): at 14:12

## 2022-12-02 RX ADMIN — SENNA PLUS 2 TABLET(S): 8.6 TABLET ORAL at 21:26

## 2022-12-02 RX ADMIN — QUETIAPINE FUMARATE 12.5 MILLIGRAM(S): 200 TABLET, FILM COATED ORAL at 21:27

## 2022-12-02 RX ADMIN — Medication 25 MILLIGRAM(S): at 18:37

## 2022-12-02 NOTE — PROGRESS NOTE ADULT - SUBJECTIVE AND OBJECTIVE BOX
Patient is a 67y old  Female who presents with a chief complaint of cerebellar IPH with IVH (2022 13:00)      HPI:  Diana Yadav is a 66yo Female PMH HTN, dyslipidemia, new onset dementia, Etoh abuse (1/2 - 1 bottle of wine daily) presents from [facility] 22 s/p an unwitnessed fall with trauma to the front of the head.  Patient developed increasing confusion the following day.  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 EVD placed  (removed ).     EEG monitoring without epileptiform activity recorded x1 day. Patient initially placed on CIWA, and did not display signs of withdrawal. CTA without evidence of malformation; MRI+ multiple cavernomas which are likely the etiology of ICH. She was Rx for UTI , with final dose of Ceftriaxone on .     Patient was optimized for d/c to acute rehab on 22.  (2022 12:39)      PAST MEDICAL & SURGICAL HISTORY:      MEDICATIONS  (STANDING):  amLODIPine   Tablet 10 milliGRAM(s) Oral daily  enoxaparin Injectable 40 milliGRAM(s) SubCutaneous <User Schedule>  folic acid 1 milliGRAM(s) Oral daily  hydrALAZINE 25 milliGRAM(s) Oral two times a day  influenza  Vaccine (HIGH DOSE) 0.7 milliLiter(s) IntraMuscular once  lisinopril 40 milliGRAM(s) Oral daily  multivitamin 1 Tablet(s) Oral daily  polyethylene glycol 3350 17 Gram(s) Oral two times a day  QUEtiapine 12.5 milliGRAM(s) Oral at bedtime  senna 2 Tablet(s) Oral at bedtime  thiamine 100 milliGRAM(s) Oral daily    MEDICATIONS  (PRN):  acetaminophen     Tablet .. 650 milliGRAM(s) Oral every 6 hours PRN Mild Pain (1 - 3)  QUEtiapine 12.5 milliGRAM(s) Oral every 6 hours PRN agitation      Allergies    No Known Allergies    Intolerances          VITALS  67y  Vital Signs Last 24 Hrs  T(C): 36.8 (01 Dec 2022 07:13), Max: 36.9 (2022 20:21)  T(F): 98.2 (01 Dec 2022 07:13), Max: 98.5 (2022 20:21)  HR: 84 (01 Dec 2022 07:13) (67 - 84)  BP: 144/84 (01 Dec 2022 07:13) (133/76 - 151/88)  BP(mean): --  RR: 16 (01 Dec 2022 07:13) (16 - 16)  SpO2: 98% (01 Dec 2022 07:13) (96% - 98%)    Parameters below as of 01 Dec 2022 07:13  Patient On (Oxygen Delivery Method): room air      Daily     Daily Weight in k.4 (2022 22:47)        RECENT LABS:                          12.8   4.47  )-----------( 360      ( 01 Dec 2022 06:13 )             38.2     12    136  |  102  |  11  ----------------------------<  105<H>  3.8   |  27  |  0.66    Ca    9.2      01 Dec 2022 06:13    TPro  7.2  /  Alb  3.3  /  TBili  0.2  /  DBili  x   /  AST  101<H>  /  ALT  147<H>  /  AlkPhos  85  12-    LIVER FUNCTIONS - ( 01 Dec 2022 06:13 )  Alb: 3.3 g/dL / Pro: 7.2 g/dL / ALK PHOS: 85 U/L / ALT: 147 U/L / AST: 101 U/L / GGT: x                   CAPILLARY BLOOD GLUCOSE                   Patient is a 67y old  Female who presents with a chief complaint of cerebellar IPH with IVH (30 Nov 2022 13:00)      HPI:  Diana Yadav is a 66yo Female PMH HTN, dyslipidemia, new onset dementia, Etoh abuse (1/2 - 1 bottle of wine daily) presents from [facility] 11/18/22 s/p an unwitnessed fall with trauma to the front of the head.  Patient developed increasing confusion the following day.  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 EVD placed 11/18 (removed 11/23).     EEG monitoring without epileptiform activity recorded x1 day. Patient initially placed on CIWA, and did not display signs of withdrawal. CTA without evidence of malformation; MRI+ multiple cavernomas which are likely the etiology of ICH. She was Rx for UTI 11/26, with final dose of Ceftriaxone on 11/28.     Patient was optimized for d/c to acute rehab on 11/29/22.  (29 Nov 2022 12:39)      PAST MEDICAL & SURGICAL HISTORY:    MEDICATIONS  (STANDING):  amLODIPine   Tablet 10 milliGRAM(s) Oral daily  enoxaparin Injectable 40 milliGRAM(s) SubCutaneous <User Schedule>  folic acid 1 milliGRAM(s) Oral daily  hydrALAZINE 25 milliGRAM(s) Oral two times a day  influenza  Vaccine (HIGH DOSE) 0.7 milliLiter(s) IntraMuscular once  lisinopril 40 milliGRAM(s) Oral daily  multivitamin 1 Tablet(s) Oral daily  polyethylene glycol 3350 17 Gram(s) Oral two times a day  QUEtiapine 12.5 milliGRAM(s) Oral at bedtime  senna 2 Tablet(s) Oral at bedtime  thiamine 100 milliGRAM(s) Oral daily    MEDICATIONS  (PRN):  acetaminophen     Tablet .. 650 milliGRAM(s) Oral every 6 hours PRN Mild Pain (1 - 3)  QUEtiapine 12.5 milliGRAM(s) Oral every 6 hours PRN agitation      Allergies    No Known Allergies    Intolerances    RECENT LABS    Vital Signs Last 24 Hrs  T(C): 36.7 (02 Dec 2022 08:53), Max: 36.8 (01 Dec 2022 19:39)  T(F): 98.1 (02 Dec 2022 08:53), Max: 98.2 (01 Dec 2022 19:39)  HR: 73 (02 Dec 2022 09:29) (60 - 79)  BP: 111/74 (02 Dec 2022 09:29) (103/64 - 121/73)  RR: 16 (02 Dec 2022 09:29) (16 - 16)  SpO2: 98% (02 Dec 2022 09:29) (97% - 98%)    Parameters below as of 02 Dec 2022 09:29  Patient On (Oxygen Delivery Method): room air                    12.8   4.47  )-----------( 360      ( 01 Dec 2022 06:13 )             38.2     12-01    136  |  102  |  11  ----------------------------<  105<H>  3.8   |  27  |  0.66    Ca    9.2      01 Dec 2022 06:13    TPro  7.2  /  Alb  3.3  /  TBili  0.2  /  DBili  x   /  AST  101<H>  /  ALT  147<H>  /  AlkPhos  85  12-01        CAPILLARY BLOOD GLUCOSE          · Additional ROS	Patient seen with daughter at bedside. Aware to some extent of her confusion. Asked about duration of stay again and endorses some fear of balance during therapies - evidence of improving awareness of situation but poor recall. Staples removed. Tolerated well. Patient denies current pain, HA, dizziness.   Still notably distractable. Poor sleep last night due to distractability - reported by daughter.       Physical Exam:   · Constitutional Comments	alert, pleasant, mood stable NAD O x 1-2 +scalp laceration well healed.   · Eyes	EOMI  · Respiratory	clear to auscultation bilaterally; no wheezes; no rales; no rhonchi  · Respiratory Comments	good effort, RRR, s1/s2  · Gastrointestinal	soft; nontender; nondistended  · Motor	BUE and LE normal tone and ROM  motor 5/5 BUE and LE  no calf swelling +soft no TTP  · Sensory	intact to LT         Patient is a 67y old  Female who presents with a chief complaint of cerebellar IPH with IVH (30 Nov 2022 13:00)      HPI:  Diana Yadav is a 66yo Female PMH HTN, dyslipidemia, new onset dementia, Etoh abuse (1/2 - 1 bottle of wine daily) presents from [facility] 11/18/22 s/p an unwitnessed fall with trauma to the front of the head.  Patient developed increasing confusion the following day.  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 EVD placed 11/18 (removed 11/23).     EEG monitoring without epileptiform activity recorded x1 day. Patient initially placed on CIWA, and did not display signs of withdrawal. CTA without evidence of malformation; MRI+ multiple cavernomas which are likely the etiology of ICH. She was Rx for UTI 11/26, with final dose of Ceftriaxone on 11/28.     Patient was optimized for d/c to acute rehab on 11/29/22.  (29 Nov 2022 12:39)      PAST MEDICAL & SURGICAL HISTORY:    MEDICATIONS  (STANDING):  amLODIPine   Tablet 10 milliGRAM(s) Oral daily  enoxaparin Injectable 40 milliGRAM(s) SubCutaneous <User Schedule>  folic acid 1 milliGRAM(s) Oral daily  hydrALAZINE 25 milliGRAM(s) Oral two times a day  influenza  Vaccine (HIGH DOSE) 0.7 milliLiter(s) IntraMuscular once  lisinopril 40 milliGRAM(s) Oral daily  multivitamin 1 Tablet(s) Oral daily  polyethylene glycol 3350 17 Gram(s) Oral two times a day  QUEtiapine 12.5 milliGRAM(s) Oral at bedtime  senna 2 Tablet(s) Oral at bedtime  thiamine 100 milliGRAM(s) Oral daily    MEDICATIONS  (PRN):  acetaminophen     Tablet .. 650 milliGRAM(s) Oral every 6 hours PRN Mild Pain (1 - 3)  QUEtiapine 12.5 milliGRAM(s) Oral every 6 hours PRN agitation      Allergies    No Known Allergies    Intolerances    RECENT LABS    Vital Signs Last 24 Hrs  T(C): 36.7 (02 Dec 2022 08:53), Max: 36.8 (01 Dec 2022 19:39)  T(F): 98.1 (02 Dec 2022 08:53), Max: 98.2 (01 Dec 2022 19:39)  HR: 73 (02 Dec 2022 09:29) (60 - 79)  BP: 111/74 (02 Dec 2022 09:29) (103/64 - 121/73)  RR: 16 (02 Dec 2022 09:29) (16 - 16)  SpO2: 98% (02 Dec 2022 09:29) (97% - 98%)    Parameters below as of 02 Dec 2022 09:29  Patient On (Oxygen Delivery Method): room air                    12.8   4.47  )-----------( 360      ( 01 Dec 2022 06:13 )             38.2     12-01    136  |  102  |  11  ----------------------------<  105<H>  3.8   |  27  |  0.66    Ca    9.2      01 Dec 2022 06:13    TPro  7.2  /  Alb  3.3  /  TBili  0.2  /  DBili  x   /  AST  101<H>  /  ALT  147<H>  /  AlkPhos  85  12-01        CAPILLARY BLOOD GLUCOSE          · Additional ROS	Patient seen with daughter at bedside. Aware to some extent of her confusion. Asked about duration of stay again and endorses some fear of balance during therapies - evidence of improving awareness of situation but poor recall. Daughter reports interrupted sleep x 2 from bed alarm     Staples removed. Tolerated well. Patient denies current pain, HA, dizziness. Still notably distractable.     Physical Exam:   · Constitutional Comments	alert, pleasant, mood stable NAD O x 1-2 +scalp laceration well healed. Staples dc 12/2  · Eyes	EOMI no nystagmus  · Respiratory	clear to auscultation bilaterally; no wheezes; no rales; no rhonchi  · Respiratory Comments	good effort, RRR, s1/s2  · Gastrointestinal	soft; nontender; nondistended  · Motor	BUE and LE normal tone and ROM  motor 5/5 BUE and LE  no calf swelling +soft no TTP  · Sensory	intact to LT

## 2022-12-02 NOTE — PROGRESS NOTE ADULT - ASSESSMENT
67y with a PMH HTN and ETOH abuse who presented to [facility] on 11/18/22 s/p unwitnessed fall with confusion and found to have cerebellar IPH and IVH, s/p EVD. Hospital course complicated by UTI, s/p abx. Now admitted to [facility] after for initiation of a multidisciplinary rehab program consisting focused on functional mobility, transfers and ADLs    # traumatic left cerebellar IPH and IVH s/p fall  - Head CT 11/25: Evolving left cerebellar hematoma with surrounding lucency as seen on the prior and mass effect on the fourth ventricle. No change in appearance of the hematoma in the left cerebellum as well as trace heme in the right frontal region along the prior shunt tract.  - neuro checks q 12 hours  - seroquel qhs. noted by SLP to have alternating laughing/crying, emotional labilty ? Pseudobulbar affect. Will follow, may consider low dose sertaline  - toileting schedule, bed and chair alarm, video monitoring for safety  - continue comprehensive rehab program, PT/OT/SLP 3 hours a day, 5 days a week.  - Precautions: falls, cardiac, AMS    # EtohA  - c/w thiamine, folic acid, and multivitamin  - Neuropsychology consult PRN   - social work, recreation therapy    # s/p fall 11/29  - Head CT 11/29: Resolving left cerebellar hematoma.Near complete resolution of right frontal lobe hemorrhage.Stable tiny focus of hemorrhage in the right occipital region which may be intraparenchymal or subarachnoid in location.No new intraparenchymal hemorrhage identified. Reviewed with patient and family  - C spine CT 11/29: No fracture or traumatic subluxation.  - Xray shoulder 11/29: no fracture  - Xray pelvis/hip 11/29:  mild degenerative change with no fracture, dislocation or radiographic soft tissue abnormality  - bed and chair alarms  - toileting program  - continue to reinforce safety, reorient    # HTN  - amlodipine 10 mg  - lisinopril 40 mg  -  (133/76 - 151/88) 12/1    # Pain Management:  - Tylenol PRN    # GI/Bowel:  - Senna QHS, Miralax PRN Daily    # Diet:  - easy to chew--> upgraded to regular thins 12/1    # DVT ppx:  - Lovenox  - Last Doppler on 11/24/22    # LABS 12/1 reviewed, stable    # LABS  CBC BMP 12/5  ---------------  Code Status/Emergency Contact:    Outpatient Follow-up (Specialty/Name of physician):  David Franco (DO)  Neurology; Vascular Neurology  [address] [facility], [address]  [place] [address]  Phone: [phone]  Fax: [phone]  Follow Up Time: 1 week    Abdifatah Molina (DO)  Cardiology; Internal Medicine  [address] [place] [address], [address]  [place] [address]  Phone: [phone]  Fax: [phone]  Follow Up Time: 2 weeks   67y with a PMH HTN and ETOH abuse who presented to [facility] on 11/18/22 s/p unwitnessed fall with confusion and found to have cerebellar IPH and IVH, s/p EVD. Hospital course complicated by UTI, s/p abx. Now admitted to [facility] after for initiation of a multidisciplinary rehab program consisting focused on functional mobility, transfers and ADLs    # traumatic left cerebellar IPH and IVH s/p fall  - Head CT 11/25: Evolving left cerebellar hematoma with surrounding lucency as seen on the prior and mass effect on the fourth ventricle. No change in appearance of the hematoma in the left cerebellum as well as trace heme in the right frontal region along the prior shunt tract.  - neuro checks q 12 hours  - seroquel qhs. 12/1 noted by SLP to have alternating laughing/crying, emotional labilty ? Pseudobulbar affect. Will follow, may consider low dose sertaline.   Affect WNL during physical examination on rounds. Will continue to monitor and discuss on team rounds.  - toileting schedule, bed and chair alarm, video monitoring for safety  - continue comprehensive rehab program, PT/OT/SLP 3 hours a day, 5 days a week.  - Precautions: falls, cardiac, AMS    # EtohA  - c/w thiamine, folic acid, and multivitamin  - Neuropsychology consult PRN   - social work, recreation therapy    # s/p fall 11/29  - Head CT 11/29: Resolving left cerebellar hematoma.Near complete resolution of right frontal lobe hemorrhage.Stable tiny focus of hemorrhage in the right occipital region which may be intraparenchymal or subarachnoid in location.No new intraparenchymal hemorrhage identified. Reviewed with patient and family  - C spine CT 11/29: No fracture or traumatic subluxation.  - Xray shoulder 11/29: no fracture  - Xray pelvis/hip 11/29:  mild degenerative change with no fracture, dislocation or radiographic soft tissue abnormality  - bed and chair alarms  - toileting program  - continue to reinforce safety, reorient    # HTN  - amlodipine 10 mg  - lisinopril 40 mg  - (103/64 - 121/73) 12/2, improved    # Pain Management:  - Tylenol PRN    # GI/Bowel:  - Senna QHS, Miralax PRN Daily    # Diet:  - easy to chew--> upgraded to regular thins 12/1    # DVT ppx:  - Lovenox  - Last Doppler on 11/24/22    # LABS 12/1 reviewed, stable    # LABS  CBC BMP 12/5  ---------------  Code Status/Emergency Contact:    Outpatient Follow-up (Specialty/Name of physician):  David Franco (DO)  Neurology; Vascular Neurology  [address] [facility], [address]  [place] [address]  Phone: [phone]  Fax: [phone]  Follow Up Time: 1 week    Abdifatah Molina (DO)  Cardiology; Internal Medicine  [address] [facility], [address]  [place] [address]  Phone: [phone]  Fax: [phone]  Follow Up Time: 2 weeks   67y with a PMH HTN and ETOH abuse who presented to [facility] on 11/18/22 s/p unwitnessed fall with confusion and found to have cerebellar IPH and IVH, s/p EVD. Hospital course complicated by UTI    # traumatic left cerebellar IPH and IVH s/p fall  - Head CT 11/25: Evolving left cerebellar hematoma with surrounding lucency as seen on the prior and mass effect on the fourth ventricle. No change in appearance of the hematoma in the left cerebellum as well as trace heme in the right frontal region along the prior shunt tract.  - staples removed 12/2, patient may shower  - neuro checks q 12 hours. Stable, dc 12/2  - seroquel qhs  - toileting schedule, bed and chair alarm, video monitoring for safety  - continue comprehensive rehab program, PT/OT/SLP 3 hours a day, 5 days a week.  - Precautions: falls, cardiac, AMS    # EtohA  - c/w thiamine, folic acid, and multivitamin  - Neuropsychology consult PRN   - social work, recreation therapy    # s/p fall 11/29  - Head CT 11/29: Resolving left cerebellar hematoma.Near complete resolution of right frontal lobe hemorrhage.Stable tiny focus of hemorrhage in the right occipital region which may be intraparenchymal or subarachnoid in location.No new intraparenchymal hemorrhage identified. Reviewed with patient and family  - C spine CT 11/29: No fracture or traumatic subluxation.  - Xray shoulder 11/29: no fracture  - Xray pelvis/hip 11/29:  mild degenerative change with no fracture, dislocation or radiographic soft tissue abnormality  - bed and chair alarms  - toileting program  - continue to reinforce safety, reorient    # HTN  - amlodipine 10 mg  - lisinopril 40 mg  - (103/64 - 121/73) 12/2, improved    # Pain Management:  - Tylenol PRN    # GI/Bowel:  - Senna QHS, Miralax PRN Daily    # Diet:  - regular thins     # DVT ppx:  - Lovenox  - Last Doppler on 11/24/22      # LABS  CBC BMP 12/5  ---------------  Code Status/Emergency Contact:    Outpatient Follow-up (Specialty/Name of physician):  David Franco (DO)  Neurology; Vascular Neurology  [address] [facility], [address]  [place] [address]  Phone: [phone]  Fax: [phone]  Follow Up Time: 1 week    Abdifatah Molina ()  Cardiology; Internal Medicine  [address] [place] [address], [address]  [place] [address]  Phone: [phone]  Fax: [phone]  Follow Up Time: 2 weeks

## 2022-12-02 NOTE — PROGRESS NOTE ADULT - ATTENDING COMMENTS
Progress note amended to include my discussions with patient, resident, hospitalist, RN, SW, PT and my findings    Patient seen with family at bedside. Patient sitting in wheelchair, pleasant, NAD. Per daughter, slept relatively well, although awoken several times during night due to bed alarm going off (not attempting to get OOB, just from movement). Patient states she realizes her balance has issues and she's scared during therapy; some increased awareness of deficits although she remains distractible and requires cues, poor recall and reduced orientation. NO emotional lability or spontaneous weeping/laughing/potential pseudobulbar affect noted    Rehab course, team meeting Monday, discussed with patient and daughter. Vitals reviewed, stable. Staples right frontal scalp x 5 removed without incidence during rounds, cleared to shower. continue rehab program

## 2022-12-03 PROCEDURE — 99232 SBSQ HOSP IP/OBS MODERATE 35: CPT

## 2022-12-03 RX ADMIN — POLYETHYLENE GLYCOL 3350 17 GRAM(S): 17 POWDER, FOR SOLUTION ORAL at 18:18

## 2022-12-03 RX ADMIN — Medication 100 MILLIGRAM(S): at 12:47

## 2022-12-03 RX ADMIN — Medication 1 MILLIGRAM(S): at 12:47

## 2022-12-03 RX ADMIN — Medication 25 MILLIGRAM(S): at 05:10

## 2022-12-03 RX ADMIN — Medication 25 MILLIGRAM(S): at 18:18

## 2022-12-03 RX ADMIN — QUETIAPINE FUMARATE 12.5 MILLIGRAM(S): 200 TABLET, FILM COATED ORAL at 21:04

## 2022-12-03 RX ADMIN — AMLODIPINE BESYLATE 10 MILLIGRAM(S): 2.5 TABLET ORAL at 05:10

## 2022-12-03 RX ADMIN — SENNA PLUS 2 TABLET(S): 8.6 TABLET ORAL at 21:03

## 2022-12-03 RX ADMIN — Medication 1 TABLET(S): at 12:47

## 2022-12-03 RX ADMIN — LISINOPRIL 40 MILLIGRAM(S): 2.5 TABLET ORAL at 05:09

## 2022-12-03 RX ADMIN — ENOXAPARIN SODIUM 40 MILLIGRAM(S): 100 INJECTION SUBCUTANEOUS at 18:17

## 2022-12-03 RX ADMIN — POLYETHYLENE GLYCOL 3350 17 GRAM(S): 17 POWDER, FOR SOLUTION ORAL at 05:09

## 2022-12-03 NOTE — PROGRESS NOTE ADULT - SUBJECTIVE AND OBJECTIVE BOX
Patient is a 67y old  Female who presents with a chief complaint of cerebellar IPH with IVH (02 Dec 2022 14:32)    Patient seen and examined at bedside. No events overnight. Patient denies headache, changes in vision, chest pain, sob. Daughter at bedside.    ALLERGIES:  No Known Allergies    MEDICATIONS  (STANDING):  amLODIPine   Tablet 10 milliGRAM(s) Oral daily  enoxaparin Injectable 40 milliGRAM(s) SubCutaneous <User Schedule>  folic acid 1 milliGRAM(s) Oral daily  hydrALAZINE 25 milliGRAM(s) Oral two times a day  influenza  Vaccine (HIGH DOSE) 0.7 milliLiter(s) IntraMuscular once  lisinopril 40 milliGRAM(s) Oral daily  multivitamin 1 Tablet(s) Oral daily  polyethylene glycol 3350 17 Gram(s) Oral two times a day  QUEtiapine 12.5 milliGRAM(s) Oral at bedtime  senna 2 Tablet(s) Oral at bedtime  thiamine 100 milliGRAM(s) Oral daily    MEDICATIONS  (PRN):  acetaminophen     Tablet .. 650 milliGRAM(s) Oral every 6 hours PRN Mild Pain (1 - 3)  QUEtiapine 12.5 milliGRAM(s) Oral every 6 hours PRN agitation    Vital Signs Last 24 Hrs  T(F): 98.5 (03 Dec 2022 11:00), Max: 98.5 (03 Dec 2022 11:00)  HR: 85 (03 Dec 2022 11:00) (64 - 86)  BP: 115/73 (03 Dec 2022 11:00) (114/68 - 145/75)  RR: 18 (03 Dec 2022 11:00) (14 - 18)  SpO2: 98% (03 Dec 2022 11:00) (94% - 98%)  I&O's Summary      PHYSICAL EXAM:  GENERAL: NAD, sitting in bed, slightly confused but follows commands  EYES: PERRL, conjunctiva and sclera clear, ecchymosis on lateral corner of L eye  ENMT: Moist mucous membranes, Good dentition  CHEST/LUNG: Clear to auscultation bilaterally, non-labored breathing, good air entry  HEART: RRR; S1/S2, No murmur  ABDOMEN: Soft, Nontender, Nondistended; Bowel sounds present  EXTREMITIES: No cyanosis, No edema, 5/5 strength in all extremities  SKIN: Warm, perfused  NERVOUS SYSTEM:  A/O x2 (name, hospital, but not date), moves all extremities    LABS:                        12.8   4.47  )-----------( 360      ( 01 Dec 2022 06:13 )             38.2     12-01    136  |  102  |  11  ----------------------------<  105  3.8   |  27  |  0.66    Ca    9.2      01 Dec 2022 06:13    TPro  7.2  /  Alb  3.3  /  TBili  0.2  /  DBili  x   /  AST  101  /  ALT  147  /  AlkPhos  85  12-01 11-19 Chol 250 mg/dL LDL -- HDL 64 mg/dL Trig 118 mg/dL                      COVID-19 PCR: NotDetec (11-29-22 @ 17:45)  COVID-19 PCR: NotDetec (11-27-22 @ 18:38)      RADIOLOGY & ADDITIONAL TESTS:    Care Discussed with Consultants/Other Providers:

## 2022-12-03 NOTE — PROGRESS NOTE ADULT - ASSESSMENT
67 year old F PMH HTN and ETOH abuse who presented to [facility] on 11/18/22 s/p unwitnessed fall with confusion and found to have cerebellar IPH and IVH, s/p EVD now admitted to  rehab for ADL impairment     #cerebellar IPH and IVH  - Head CT 11/25: Evolving left cerebellar hematoma with surrounding lucency as seen on the prior and mass effect on the fourth ventricle. No change in appearance of the hematoma in the left cerebellum as well as trace heme in the right frontal region along the prior shunt tract  - neuro check q12 hours  - could be in setting of uncontrolled HTN vs vascular lesion (MRI showed multiple cavernomas)  - will need repeat MRI between 12/16/12/30 once hemorrhage resorbed to r/o malformation  - continue comprehensive rehab program    #EtOH abuse  - patient's family admits to 10 glasses wine a night  - as per daughter, required medications as she was in EtOH withdrawal and had tremors  - continue with seroquel qhs and q6hrs PRN  - continue thiamine, folic acid, and multivitamin    #HTN  - continue amlodipine 10mg and lisinopril 40mg  - started hydralazine 25mg BID 11/30, continue to monitor  - SBP goal <140    #DVT ppx  - Lovenox  - no further AC/AP indicated due to IPH   - Last Doppler on 11/24/22

## 2022-12-03 NOTE — PROGRESS NOTE ADULT - SUBJECTIVE AND OBJECTIVE BOX
Subjective: No new complaints or overnight issues, daughter at bedside    VITALS  T(C): 36.9 (12-03-22 @ 11:00), Max: 36.9 (12-03-22 @ 11:00)  HR: 85 (12-03-22 @ 11:00) (64 - 86)  BP: 115/73 (12-03-22 @ 11:00) (114/68 - 145/75)  RR: 18 (12-03-22 @ 11:00) (14 - 18)  SpO2: 98% (12-03-22 @ 11:00) (94% - 98%)  Wt(kg): --    REVIEW OF SYSTEMS  Pertinent in last 24 h: Neurological deficits--cognitive      Physical Exam:  Constitutional - NAD, Comfortable  HEENT - EVD site healed  Chest - breathing comfortably on RA  Cardiovascular - RRR,   Abdomen - Soft, NTND  Extremities - No edema, No calf tenderness   Neurologic Exam -    Stable                Cognitive - Awake, Alert, AAO to self,      Motor - no new deficit     Psychiatric - Mood stable, Affect WNL  -    RECENT LABS/IMAGING                  MEDICATIONS   acetaminophen     Tablet .. 650 milliGRAM(s) every 6 hours PRN  amLODIPine   Tablet 10 milliGRAM(s) daily  enoxaparin Injectable 40 milliGRAM(s) <User Schedule>  folic acid 1 milliGRAM(s) daily  hydrALAZINE 25 milliGRAM(s) two times a day  influenza  Vaccine (HIGH DOSE) 0.7 milliLiter(s) once  lisinopril 40 milliGRAM(s) daily  multivitamin 1 Tablet(s) daily  polyethylene glycol 3350 17 Gram(s) two times a day  QUEtiapine 12.5 milliGRAM(s) every 6 hours PRN  QUEtiapine 12.5 milliGRAM(s) at bedtime  senna 2 Tablet(s) at bedtime  thiamine 100 milliGRAM(s) daily      --------------------------------------------------------------------

## 2022-12-03 NOTE — PROGRESS NOTE ADULT - ASSESSMENT
68 yo F  admitted to acute Rehabilitation with Cerebellar IPH and IVH    Pt. Stable  Active Issues     traumatic left cerebellar IPH and IVH s/p fall  Behavior- seroquel qhs  -- Precautions: falls, cardiac, AMS    # EtohA  - c/w thiamine, folic acid, and multivitamin          # HTN  - amlodipine 10 mg  - lisinopril 40 mg  - bp controlled    # Pain Management:  - Tylenol PRN    # GI/Bowel:  - Senna QHS, Miralax     # Diet:  - regular thins     # DVT ppx:  - Lovenox      Cont. comprehensive inpatient PT, OT and Speech    No acute issues,   Cont. current plan of care and medications as per primary team

## 2022-12-04 PROCEDURE — 99232 SBSQ HOSP IP/OBS MODERATE 35: CPT

## 2022-12-04 RX ADMIN — Medication 25 MILLIGRAM(S): at 05:58

## 2022-12-04 RX ADMIN — Medication 25 MILLIGRAM(S): at 17:36

## 2022-12-04 RX ADMIN — Medication 1 MILLIGRAM(S): at 12:13

## 2022-12-04 RX ADMIN — POLYETHYLENE GLYCOL 3350 17 GRAM(S): 17 POWDER, FOR SOLUTION ORAL at 05:58

## 2022-12-04 RX ADMIN — SENNA PLUS 2 TABLET(S): 8.6 TABLET ORAL at 21:10

## 2022-12-04 RX ADMIN — ENOXAPARIN SODIUM 40 MILLIGRAM(S): 100 INJECTION SUBCUTANEOUS at 17:34

## 2022-12-04 RX ADMIN — Medication 1 TABLET(S): at 12:13

## 2022-12-04 RX ADMIN — Medication 100 MILLIGRAM(S): at 12:13

## 2022-12-04 RX ADMIN — QUETIAPINE FUMARATE 12.5 MILLIGRAM(S): 200 TABLET, FILM COATED ORAL at 21:09

## 2022-12-04 RX ADMIN — LISINOPRIL 40 MILLIGRAM(S): 2.5 TABLET ORAL at 06:01

## 2022-12-04 RX ADMIN — AMLODIPINE BESYLATE 10 MILLIGRAM(S): 2.5 TABLET ORAL at 05:58

## 2022-12-04 NOTE — PROGRESS NOTE ADULT - SUBJECTIVE AND OBJECTIVE BOX
Patient is a 67y old  Female who presents with a chief complaint of cerebellar IPH with IVH (03 Dec 2022 16:31)    Patient seen and examined at bedside. No events overnight. Patient denies headache, changes in vision, chest pain, sob. Daughter at bedside.    ALLERGIES:  No Known Allergies    MEDICATIONS  (STANDING):  amLODIPine   Tablet 10 milliGRAM(s) Oral daily  enoxaparin Injectable 40 milliGRAM(s) SubCutaneous <User Schedule>  folic acid 1 milliGRAM(s) Oral daily  hydrALAZINE 25 milliGRAM(s) Oral two times a day  influenza  Vaccine (HIGH DOSE) 0.7 milliLiter(s) IntraMuscular once  lisinopril 40 milliGRAM(s) Oral daily  multivitamin 1 Tablet(s) Oral daily  polyethylene glycol 3350 17 Gram(s) Oral two times a day  QUEtiapine 12.5 milliGRAM(s) Oral at bedtime  senna 2 Tablet(s) Oral at bedtime  thiamine 100 milliGRAM(s) Oral daily    MEDICATIONS  (PRN):  acetaminophen     Tablet .. 650 milliGRAM(s) Oral every 6 hours PRN Mild Pain (1 - 3)  QUEtiapine 12.5 milliGRAM(s) Oral every 6 hours PRN agitation    Vital Signs Last 24 Hrs  T(F): 98.1 (04 Dec 2022 08:25), Max: 98.5 (03 Dec 2022 11:00)  HR: 65 (04 Dec 2022 08:25) (65 - 85)  BP: 147/81 (04 Dec 2022 08:25) (115/73 - 154/88)  RR: 14 (04 Dec 2022 08:25) (14 - 18)  SpO2: 97% (04 Dec 2022 08:25) (97% - 98%)  I&O's Summary      PHYSICAL EXAM:  GENERAL: NAD, sitting in bed, slightly confused but follows commands  EYES: PERRL, conjunctiva and sclera clear, ecchymosis on lateral corner of L eye  ENMT: Moist mucous membranes, Good dentition  CHEST/LUNG: Clear to auscultation bilaterally, non-labored breathing, good air entry  HEART: RRR; S1/S2, No murmur  ABDOMEN: Soft, Nontender, Nondistended; Bowel sounds present  EXTREMITIES: No cyanosis, No edema, 5/5 strength in all extremities  SKIN: Warm, perfused  NERVOUS SYSTEM:  A/O x2 (name, hospital, but not date), moves all extremities    LABS:                    11-19 Chol 250 mg/dL LDL -- HDL 64 mg/dL Trig 118 mg/dL                      COVID-19 PCR: NotDetec (11-29-22 @ 17:45)  COVID-19 PCR: NotDetec (11-27-22 @ 18:38)      RADIOLOGY & ADDITIONAL TESTS:    Care Discussed with Consultants/Other Providers:

## 2022-12-04 NOTE — CHART NOTE - NSCHARTNOTEFT_GEN_A_CORE
Nutrition Follow Up Note  Hospital Course   (Per Electronic Medical Record)    Source:  Patient [X]  Family Member [X]   Medical Record [X]      Diet: Diet, Regular (12-01-22 @ 15:19) [Active]    Patient w/ adequate appetite/intake at this time consuming % of meal trays. No issues w/ chewing and swallowing. Denies N/V/C/D, last BM 12/3. Follows a heart healthy diet at home, agreeable to DASH/TLC diet, provided education + written material.       Enteral/Parenteral Nutrition: Not Applicable    Current Weight: 115.9lb on 12/3    Pertinent Medications: MEDICATIONS  (STANDING):  amLODIPine   Tablet 10 milliGRAM(s) Oral daily  enoxaparin Injectable 40 milliGRAM(s) SubCutaneous <User Schedule>  folic acid 1 milliGRAM(s) Oral daily  hydrALAZINE 25 milliGRAM(s) Oral two times a day  influenza  Vaccine (HIGH DOSE) 0.7 milliLiter(s) IntraMuscular once  lisinopril 40 milliGRAM(s) Oral daily  multivitamin 1 Tablet(s) Oral daily  polyethylene glycol 3350 17 Gram(s) Oral two times a day  QUEtiapine 12.5 milliGRAM(s) Oral at bedtime  senna 2 Tablet(s) Oral at bedtime  thiamine 100 milliGRAM(s) Oral daily    MEDICATIONS  (PRN):  acetaminophen     Tablet .. 650 milliGRAM(s) Oral every 6 hours PRN Mild Pain (1 - 3)  QUEtiapine 12.5 milliGRAM(s) Oral every 6 hours PRN agitation      Pertinent Labs:   12-01 Alb 3.3 g/dL 11-19 Chol 250 mg/dL<H> LDL --    HDL 64 mg/dL Trig 118 mg/dL    Skin: No pressure injury per nursing flowsheets    Edema: No edema noted per nursing flowsheet    Last Bowel Movement: on 12/3 Per nursing flowsheets     Estimated Needs:   [X] No Change Since Previous Assessment    Previous Nutrition Diagnosis:   Increased Nutrient Needs...  Increased protein & energy needs  related to increased physiological demand for nutrient  as evidenced by ICH/IVH    Nutrition Diagnosis is [X] Resolved - address w/ patient meeting goal intake >75% of meal tray       New Nutrition Diagnosis: [X] Not Applicable    Interventions:   1) Recommend DASH/TLC diet.   2) Monitor PO intake, GI tolerance, skin integrity, labs, weight, and bowel movement regularity.   4) Honor food preferences as feasible.   5) Provide ongoing diet education as needed  6) RD remains available upon request and will follow-up per protocol     Monitoring & Evaluation:   [X] Weights   [X] PO Intake   [X] Skin Integrity   [X] Follow Up (Per Protocol)  [X] Tolerance to Diet Prescription   [X] Other: Labs     Registered Dietitian/Nutritionist Remains Available.  Ekta Odonnell RD, MS, CDN    Phone# [phone]

## 2022-12-04 NOTE — PROGRESS NOTE ADULT - ASSESSMENT
67 year old F PMH HTN and ETOH abuse who presented to [facility] on 11/18/22 s/p unwitnessed fall with confusion and found to have cerebellar IPH and IVH, s/p EVD now admitted to  rehab for ADL impairment     #cerebellar IPH and IVH  - Head CT 11/25: Evolving left cerebellar hematoma with surrounding lucency as seen on the prior and mass effect on the fourth ventricle. No change in appearance of the hematoma in the left cerebellum as well as trace heme in the right frontal region along the prior shunt tract  - neuro check q12 hours  - could be in setting of uncontrolled HTN vs vascular lesion (MRI showed multiple cavernomas)  - will need repeat MRI between 12/16/12/30 once hemorrhage resorbed to r/o malformation  - continue comprehensive rehab program    #EtOH abuse  - patient's family admits to 10 glasses wine a night  - as per daughter, required medications as she was in EtOH withdrawal and had tremors  - continue with seroquel qhs and q6hrs PRN  - continue thiamine, folic acid, and multivitamin    #HTN  - continue amlodipine 10mg and lisinopril 40mg  - started hydralazine 25mg BID 11/30, continue to monitor, can increase tomorrow if continues to have elevated readings and not orthostatic in PT  - SBP goal <140    #DVT ppx  - Lovenox  - no further AC/AP indicated due to IPH   - Last Doppler on 11/24/22

## 2022-12-05 LAB
ALBUMIN SERPL ELPH-MCNC: 3.9 G/DL — SIGNIFICANT CHANGE UP (ref 3.3–5)
ALP SERPL-CCNC: 87 U/L — SIGNIFICANT CHANGE UP (ref 40–120)
ALT FLD-CCNC: 140 U/L — HIGH (ref 10–45)
ANION GAP SERPL CALC-SCNC: 9 MMOL/L — SIGNIFICANT CHANGE UP (ref 5–17)
AST SERPL-CCNC: 50 U/L — HIGH (ref 10–40)
BASOPHILS # BLD AUTO: 0.05 K/UL — SIGNIFICANT CHANGE UP (ref 0–0.2)
BASOPHILS NFR BLD AUTO: 1.1 % — SIGNIFICANT CHANGE UP (ref 0–2)
BILIRUB SERPL-MCNC: 0.3 MG/DL — SIGNIFICANT CHANGE UP (ref 0.2–1.2)
BUN SERPL-MCNC: 12 MG/DL — SIGNIFICANT CHANGE UP (ref 7–23)
CALCIUM SERPL-MCNC: 9.9 MG/DL — SIGNIFICANT CHANGE UP (ref 8.4–10.5)
CHLORIDE SERPL-SCNC: 101 MMOL/L — SIGNIFICANT CHANGE UP (ref 96–108)
CO2 SERPL-SCNC: 28 MMOL/L — SIGNIFICANT CHANGE UP (ref 22–31)
CREAT SERPL-MCNC: 0.78 MG/DL — SIGNIFICANT CHANGE UP (ref 0.5–1.3)
EGFR: 84 ML/MIN/1.73M2 — SIGNIFICANT CHANGE UP
EOSINOPHIL # BLD AUTO: 0.08 K/UL — SIGNIFICANT CHANGE UP (ref 0–0.5)
EOSINOPHIL NFR BLD AUTO: 1.7 % — SIGNIFICANT CHANGE UP (ref 0–6)
GLUCOSE SERPL-MCNC: 108 MG/DL — HIGH (ref 70–99)
HCT VFR BLD CALC: 43.1 % — SIGNIFICANT CHANGE UP (ref 34.5–45)
HGB BLD-MCNC: 14.2 G/DL — SIGNIFICANT CHANGE UP (ref 11.5–15.5)
IMM GRANULOCYTES NFR BLD AUTO: 0.4 % — SIGNIFICANT CHANGE UP (ref 0–0.9)
LYMPHOCYTES # BLD AUTO: 1.74 K/UL — SIGNIFICANT CHANGE UP (ref 1–3.3)
LYMPHOCYTES # BLD AUTO: 36.9 % — SIGNIFICANT CHANGE UP (ref 13–44)
MCHC RBC-ENTMCNC: 31.7 PG — SIGNIFICANT CHANGE UP (ref 27–34)
MCHC RBC-ENTMCNC: 32.9 GM/DL — SIGNIFICANT CHANGE UP (ref 32–36)
MCV RBC AUTO: 96.2 FL — SIGNIFICANT CHANGE UP (ref 80–100)
MONOCYTES # BLD AUTO: 0.31 K/UL — SIGNIFICANT CHANGE UP (ref 0–0.9)
MONOCYTES NFR BLD AUTO: 6.6 % — SIGNIFICANT CHANGE UP (ref 2–14)
NEUTROPHILS # BLD AUTO: 2.51 K/UL — SIGNIFICANT CHANGE UP (ref 1.8–7.4)
NEUTROPHILS NFR BLD AUTO: 53.3 % — SIGNIFICANT CHANGE UP (ref 43–77)
NRBC # BLD: 0 /100 WBCS — SIGNIFICANT CHANGE UP (ref 0–0)
PLATELET # BLD AUTO: 496 K/UL — HIGH (ref 150–400)
POTASSIUM SERPL-MCNC: 4.1 MMOL/L — SIGNIFICANT CHANGE UP (ref 3.5–5.3)
POTASSIUM SERPL-SCNC: 4.1 MMOL/L — SIGNIFICANT CHANGE UP (ref 3.5–5.3)
PROT SERPL-MCNC: 8.1 G/DL — SIGNIFICANT CHANGE UP (ref 6–8.3)
RBC # BLD: 4.48 M/UL — SIGNIFICANT CHANGE UP (ref 3.8–5.2)
RBC # FLD: 11.6 % — SIGNIFICANT CHANGE UP (ref 10.3–14.5)
SODIUM SERPL-SCNC: 138 MMOL/L — SIGNIFICANT CHANGE UP (ref 135–145)
WBC # BLD: 4.71 K/UL — SIGNIFICANT CHANGE UP (ref 3.8–10.5)
WBC # FLD AUTO: 4.71 K/UL — SIGNIFICANT CHANGE UP (ref 3.8–10.5)

## 2022-12-05 PROCEDURE — 99232 SBSQ HOSP IP/OBS MODERATE 35: CPT | Mod: GC

## 2022-12-05 RX ADMIN — Medication 25 MILLIGRAM(S): at 05:44

## 2022-12-05 RX ADMIN — AMLODIPINE BESYLATE 10 MILLIGRAM(S): 2.5 TABLET ORAL at 05:44

## 2022-12-05 RX ADMIN — POLYETHYLENE GLYCOL 3350 17 GRAM(S): 17 POWDER, FOR SOLUTION ORAL at 05:42

## 2022-12-05 RX ADMIN — POLYETHYLENE GLYCOL 3350 17 GRAM(S): 17 POWDER, FOR SOLUTION ORAL at 18:01

## 2022-12-05 RX ADMIN — QUETIAPINE FUMARATE 12.5 MILLIGRAM(S): 200 TABLET, FILM COATED ORAL at 21:04

## 2022-12-05 RX ADMIN — LISINOPRIL 40 MILLIGRAM(S): 2.5 TABLET ORAL at 05:43

## 2022-12-05 RX ADMIN — SENNA PLUS 2 TABLET(S): 8.6 TABLET ORAL at 21:04

## 2022-12-05 RX ADMIN — Medication 1 MILLIGRAM(S): at 12:43

## 2022-12-05 RX ADMIN — ENOXAPARIN SODIUM 40 MILLIGRAM(S): 100 INJECTION SUBCUTANEOUS at 18:00

## 2022-12-05 RX ADMIN — Medication 25 MILLIGRAM(S): at 18:00

## 2022-12-05 RX ADMIN — Medication 1 TABLET(S): at 12:42

## 2022-12-05 RX ADMIN — Medication 100 MILLIGRAM(S): at 12:43

## 2022-12-05 NOTE — PROGRESS NOTE ADULT - SUBJECTIVE AND OBJECTIVE BOX
Patient is a 67y old  Female who presents with a chief complaint of cerebellar IPH with IVH (30 Nov 2022 13:00)      HPI:  Diana Yadav is a 66yo Female PMH HTN, dyslipidemia, new onset dementia, Etoh abuse (1/2 - 1 bottle of wine daily) presents from [facility] 11/18/22 s/p an unwitnessed fall with trauma to the front of the head.  Patient developed increasing confusion the following day.  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 EVD placed 11/18 (removed 11/23).     EEG monitoring without epileptiform activity recorded x1 day. Patient initially placed on CIWA, and did not display signs of withdrawal. CTA without evidence of malformation; MRI+ multiple cavernomas which are likely the etiology of ICH. She was Rx for UTI 11/26, with final dose of Ceftriaxone on 11/28.     Patient was optimized for d/c to acute rehab on 11/29/22.  (29 Nov 2022 12:39)    RECENT LABS    Vital Signs Last 24 Hrs  T(C): 36.6 (05 Dec 2022 08:00), Max: 36.6 (05 Dec 2022 08:00)  T(F): 97.9 (05 Dec 2022 08:00), Max: 97.9 (05 Dec 2022 08:00)  HR: 62 (05 Dec 2022 08:00) (62 - 72)  BP: 116/67 (05 Dec 2022 08:00) (116/67 - 144/87)  RR: 14 (05 Dec 2022 08:00) (14 - 15)  SpO2: 98% (05 Dec 2022 08:00) (96% - 98%)    Parameters below as of 05 Dec 2022 08:00  Patient On (Oxygen Delivery Method): room air                            14.2   4.71  )-----------( 496      ( 05 Dec 2022 06:59 )             43.1     12-05    138  |  101  |  12  ----------------------------<  108<H>  4.1   |  28  |  0.78    Ca    9.9      05 Dec 2022 06:59    TPro  8.1  /  Alb  3.9  /  TBili  0.3  /  DBili  x   /  AST  50<H>  /  ALT  140<H>  /  AlkPhos  87  12-05        CAPILLARY BLOOD GLUCOSE              · Additional ROS	Patient seen with daughter at bedside. Aware to some extent of her confusion. Asked about duration of stay again and endorses some fear of balance during therapies - evidence of improving awareness of situation but poor recall. Daughter reports interrupted sleep x 2 from bed alarm     Staples removed. Tolerated well. Patient denies current pain, HA, dizziness. Still notably distractable.     Patient seen and examined with daughter bedside. Daughter has been staying overnight as patient's confusion is exacerbated in the evenings. Patient with poor memory, stating month as Feb even on repeat questioning later in conversation.     Physical Exam:   · Constitutional Comments	awake AO to self and hospital though stated [place]. mood stable NAD. +scalp laceration well healed.   · Eyes	EOMI no nystagmus  · Respiratory	NAD, no conversational dyspnea  · Respiratory Comments	good effort, RRR, s1/s2  · Gastrointestinal	soft; nontender; nondistended  · Motor	no calf swelling BL. BLUE antigravity in PE; on admission normal tone and 5/5s.   · Sensory	intact to LT         Patient is a 67y old  Female who presents with a chief complaint of cerebellar IPH with IVH    Subjective/ROS    Patient seen with daughter at bedside. Aware to some extent of her confusion. Asked about duration of stay again and endorses some fear of balance during therapies - evidence of improving awareness of situation but poor recall. Daughter reports interrupted sleep x 2 from bed alarm  still notably distractable.     Daughter has been staying overnight as patient's confusion is exacerbated in the evenings. Patient with poor memory, stating month as Feb even on repeat questioning later in conversation.     Patient denies current pain, HA, dizziness.  Denies CP/dyspnea  Denies abdominal pain or nausea          Vital Signs Last 24 Hrs  T(C): 36.6 (05 Dec 2022 08:00), Max: 36.6 (05 Dec 2022 08:00)  T(F): 97.9 (05 Dec 2022 08:00), Max: 97.9 (05 Dec 2022 08:00)  HR: 62 (05 Dec 2022 08:00) (62 - 72)  BP: 116/67 (05 Dec 2022 08:00) (116/67 - 144/87)  RR: 14 (05 Dec 2022 08:00) (14 - 15)  SpO2: 98% (05 Dec 2022 08:00) (96% - 98%)    Parameters below as of 05 Dec 2022 08:00  Patient On (Oxygen Delivery Method): room air               Physical Exam:   · Constitutional Comments	awake AO to self and hospital though stated [place]. mood stable NAD. +scalp laceration well healed.   · Eyes	EOMI no nystagmus  · Respiratory	NAD, no conversational dyspnea  · Respiratory Comments	good effort, RRR, s1/s2  · Gastrointestinal	soft; nontender; nondistended  · Motor	no calf swelling BL. BLUE antigravity in PE; on admission normal tone and 5/5s.   · Sensory	intact to LT                            14.2   4.71  )-----------( 496      ( 05 Dec 2022 06:59 )             43.1     12-05    138  |  101  |  12  ----------------------------<  108<H>  4.1   |  28  |  0.78    Ca    9.9      05 Dec 2022 06:59    TPro  8.1  /  Alb  3.9  /  TBili  0.3  /  DBili  x   /  AST  50<H>  /  ALT  140<H>  /  AlkPhos  87  12-05    MEDICATIONS  (STANDING):  amLODIPine   Tablet 10 milliGRAM(s) Oral daily  enoxaparin Injectable 40 milliGRAM(s) SubCutaneous <User Schedule>  folic acid 1 milliGRAM(s) Oral daily  hydrALAZINE 25 milliGRAM(s) Oral two times a day  influenza  Vaccine (HIGH DOSE) 0.7 milliLiter(s) IntraMuscular once  lisinopril 40 milliGRAM(s) Oral daily  multivitamin 1 Tablet(s) Oral daily  polyethylene glycol 3350 17 Gram(s) Oral two times a day  QUEtiapine 12.5 milliGRAM(s) Oral at bedtime  senna 2 Tablet(s) Oral at bedtime  thiamine 100 milliGRAM(s) Oral daily    MEDICATIONS  (PRN):  acetaminophen     Tablet .. 650 milliGRAM(s) Oral every 6 hours PRN Mild Pain (1 - 3)  QUEtiapine 12.5 milliGRAM(s) Oral every 6 hours PRN agitation

## 2022-12-05 NOTE — PROGRESS NOTE ADULT - ATTENDING COMMENTS
Progress note amended to include my discussions with patient, resident, hospitalist, RN, SW, PT and my findings    Patient seen with family at bedside. Patient sitting in wheelchair, pleasant, NAD. Per daughter, slept relatively well, although awoken several times during night due to bed alarm going off (not attempting to get OOB, just from movement). Patient states she realizes her balance has issues and she's scared during therapy; some increased awareness of deficits although she remains distractible and requires cues, poor recall and reduced orientation. NO emotional lability or spontaneous weeping/laughing/potential pseudobulbar affect noted    Rehab course, team meeting Monday, discussed with patient and daughter. Vitals reviewed, stable. Staples right frontal scalp x 5 removed without incidence during rounds, cleared to shower. continue rehab program Patient seen at bedside this am   Daughter at bedside- She stays overnight with patient   Patient appears comfortable while laying supine in bed,  Pleasant, and cooperative with exam   Not impulsive, did not appear distracted or with poor attention during exam, but impaired memory   Oriented to self , not to place or time -   Per daughter patient with memory impairments prior to current hospitalisation, and was being followed by PCP. Does not think mother was evaluated by neurology.    Patient states that she was on BP medications.     Daughter states that she and her brother will try to coordinate RTC care. Family's goal for patient to be able to do self care/ADLs, IDR today     2. Labs with mildly elevated LFTs    3. Continue rehab program

## 2022-12-05 NOTE — PROGRESS NOTE ADULT - ASSESSMENT
67y with a PMH HTN and ETOH abuse who presented to [facility] on 11/18/22 s/p unwitnessed fall with confusion and found to have cerebellar IPH and IVH, s/p EVD. Hospital course complicated by UTI    # traumatic left cerebellar IPH and IVH s/p fall  - Head CT 11/25: Evolving left cerebellar hematoma with surrounding lucency as seen on the prior and mass effect on the fourth ventricle. No change in appearance of the hematoma in the left cerebellum as well as trace heme in the right frontal region along the prior shunt tract.  - staples removed 12/2, patient may shower  - neuro checks q 12 hours.  - seroquel qhs  - toileting schedule, bed and chair alarm, video monitoring for safety  - continue comprehensive rehab program, PT/OT/SLP 3 hours a day, 5 days a week.  - Precautions: falls, cardiac, AMS    # EtohA  - c/w thiamine, folic acid, and multivitamin  - Neuropsychology consult PRN   - social work, recreation therapy    # s/p fall 11/29  - Head CT 11/29: Resolving left cerebellar hematoma.Near complete resolution of right frontal lobe hemorrhage.Stable tiny focus of hemorrhage in the right occipital region which may be intraparenchymal or subarachnoid in location.No new intraparenchymal hemorrhage identified. Reviewed with patient and family  - C spine CT 11/29: No fracture or traumatic subluxation.  - Xray shoulder 11/29: no fracture  - Xray pelvis/hip 11/29:  mild degenerative change with no fracture, dislocation or radiographic soft tissue abnormality  - bed and chair alarms  - toileting program  - continue to reinforce safety, reorient    # HTN  - amlodipine 10 mg  - lisinopril 40 mg  - hydralazine 25mg bid  - (116/67 - 144/87) 12/5    #attention  - consider ritalin addition 12/6 given IDT today with severe attention deficits    # Pain Management:  - Tylenol PRN    # GI/Bowel:  - Senna QHS, Miralax PRN Daily    # Diet:  - regular thins     # DVT ppx:  - Lovenox  - Last Doppler on 11/24/22    #dispo 12/5 IDT  SLP mod lang deficits; severe attention & memory deficits. impulsive, decreased safety insight.   OT CG LBD and transfers, supervision for other ADLs.   PT Supervision, impulsive.   Will need 24h supervision at home   TDD 12/10    # LABS  CBC BMP 12/8  ---------------  Code Status/Emergency Contact:    Outpatient Follow-up (Specialty/Name of physician):  David Franco (DO)  Neurology; Vascular Neurology  [address] [facility] [address]  [place] [address]  Phone: [phone]  Fax: [phone]  Follow Up Time: 1 week    Abdifatah Molina (DO)  Cardiology; Internal Medicine  [address] [place] [address], [address]  [place] [address]  Phone: [phone]  Fax: [phone]  Follow Up Time: 2 weeks   67y with a PMH HTN and ETOH abuse who presented to [facility] on 11/18/22 s/p unwitnessed fall with confusion and found to have cerebellar IPH and IVH, s/p EVD. Hospital course complicated by UTI    # traumatic left cerebellar IPH and IVH s/p fall  - Head CT 11/25: Evolving left cerebellar hematoma with surrounding lucency as seen on the prior and mass effect on the fourth ventricle. No change in appearance of the hematoma in the left cerebellum as well as trace heme in the right frontal region along the prior shunt tract.  - staples removed 12/2, patient may shower  - neuro checks q 12 hours.  - seroquel qhs  - toileting schedule, bed and chair alarm, video monitoring for safety  - continue comprehensive rehab program, PT/OT/SLP 3 hours a day, 5 days a week.  - Precautions: falls, cardiac, AMS      # EtohA  - c/w thiamine, folic acid, and multivitamin  - Neuropsychology consult PRN   - social work, recreation therapy    # s/p fall 11/29  - Head CT 11/29: Resolving left cerebellar hematoma.Near complete resolution of right frontal lobe hemorrhage.Stable tiny focus of hemorrhage in the right occipital region which may be intraparenchymal or subarachnoid in location.No new intraparenchymal hemorrhage identified. Reviewed with patient and family  - C spine CT 11/29: No fracture or traumatic subluxation.  - Xray shoulder 11/29: no fracture  - Xray pelvis/hip 11/29:  mild degenerative change with no fracture, dislocation or radiographic soft tissue abnormality  - bed and chair alarms  - toileting program  - continue to reinforce safety, reorient      # HTN  - amlodipine 10 mg  - lisinopril 40 mg  - hydralazine 25mg bid  - (116/67 - 144/87) 12/5    #attention  - consider ritalin addition 12/6 given IDT today with severe attention deficits    # Pain Management:  - Tylenol PRN    # GI/Bowel:  - Senna QHS, Miralax PRN Daily    # Diet:  - regular thins     # DVT ppx:  - Lovenox  - Last Doppler on 11/24/22    #dispo 12/5 IDT  SLP mod lang deficits; severe attention & memory deficits. impulsive, decreased safety insight.   OT CG LBD and transfers, supervision for other ADLs.   PT Supervision, impulsive.   Will need 24h supervision at home   TDD 12/10    # LABS  CBC CMP 12/6  ---------------  Code Status/Emergency Contact:    Outpatient Follow-up (Specialty/Name of physician):  David Franco (DO)  Neurology; Vascular Neurology  [address] [facility], [address]  [place] [address]  Phone: [phone]  Fax: [phone]  Follow Up Time: 1 week    Abdifatah Molina (DO)  Cardiology; Internal Medicine  [address] [place] [address], [address]  [place] [address]  Phone: [phone]  Fax: [phone]  Follow Up Time: 2 weeks   67y with a PMH HTN and ETOH abuse who presented to [facility] on 11/18/22 s/p unwitnessed fall with confusion and found to have cerebellar IPH and IVH, s/p EVD. Hospital course complicated by UTI    # traumatic left cerebellar IPH and IVH s/p fall  continue comprehensive rehab program, PT/OT/SLP 3 hours a day, 5 days a week.  - toileting schedule, bed and chair alarm, video monitoring for safety  - Precautions: falls  # s/p fall 11/29 in rehab   - continue to reinforce safety, reorient       Etoh Abuse  - c/w thiamine, folic acid, and multivitamin  - Neuropsychology consult PRN   - social work, recreation therapy    # HTN  - amlodipine 10 mg  - lisinopril 40 mg  - hydralazine 25mg bid  - (116/67 - 144/87) 12/5    #attention  - consider ritalin addition 12/6 given IDT today with severe attention deficits    # Pain Management:  - Tylenol PRN    # GI/Bowel:  - Senna QHS, Miralax PRN Daily    # Diet:  - regular thins     # DVT ppx:  - Lovenox  - Last Doppler on 11/24/22    #dispo 12/5 IDT  SLP mod lang deficits; severe attention & memory deficits. impulsive, decreased safety insight.   OT CG LBD and transfers, supervision for other ADLs.   PT Supervision, impulsive.   Will need 24h supervision at home   TDD 12/10    # LABS  CBC CMP 12/6

## 2022-12-06 LAB
ALBUMIN SERPL ELPH-MCNC: 3.5 G/DL — SIGNIFICANT CHANGE UP (ref 3.3–5)
ALP SERPL-CCNC: 79 U/L — SIGNIFICANT CHANGE UP (ref 40–120)
ALT FLD-CCNC: 113 U/L — HIGH (ref 10–45)
ANION GAP SERPL CALC-SCNC: 7 MMOL/L — SIGNIFICANT CHANGE UP (ref 5–17)
AST SERPL-CCNC: 39 U/L — SIGNIFICANT CHANGE UP (ref 10–40)
BILIRUB SERPL-MCNC: 0.4 MG/DL — SIGNIFICANT CHANGE UP (ref 0.2–1.2)
BUN SERPL-MCNC: 11 MG/DL — SIGNIFICANT CHANGE UP (ref 7–23)
CALCIUM SERPL-MCNC: 9.4 MG/DL — SIGNIFICANT CHANGE UP (ref 8.4–10.5)
CHLORIDE SERPL-SCNC: 103 MMOL/L — SIGNIFICANT CHANGE UP (ref 96–108)
CO2 SERPL-SCNC: 28 MMOL/L — SIGNIFICANT CHANGE UP (ref 22–31)
CREAT SERPL-MCNC: 0.66 MG/DL — SIGNIFICANT CHANGE UP (ref 0.5–1.3)
EGFR: 97 ML/MIN/1.73M2 — SIGNIFICANT CHANGE UP
GLUCOSE SERPL-MCNC: 112 MG/DL — HIGH (ref 70–99)
HCT VFR BLD CALC: 37.7 % — SIGNIFICANT CHANGE UP (ref 34.5–45)
HGB BLD-MCNC: 12.8 G/DL — SIGNIFICANT CHANGE UP (ref 11.5–15.5)
MCHC RBC-ENTMCNC: 31.8 PG — SIGNIFICANT CHANGE UP (ref 27–34)
MCHC RBC-ENTMCNC: 34 GM/DL — SIGNIFICANT CHANGE UP (ref 32–36)
MCV RBC AUTO: 93.5 FL — SIGNIFICANT CHANGE UP (ref 80–100)
NRBC # BLD: 0 /100 WBCS — SIGNIFICANT CHANGE UP (ref 0–0)
PLATELET # BLD AUTO: 413 K/UL — HIGH (ref 150–400)
POTASSIUM SERPL-MCNC: 4.1 MMOL/L — SIGNIFICANT CHANGE UP (ref 3.5–5.3)
POTASSIUM SERPL-SCNC: 4.1 MMOL/L — SIGNIFICANT CHANGE UP (ref 3.5–5.3)
PROT SERPL-MCNC: 7.3 G/DL — SIGNIFICANT CHANGE UP (ref 6–8.3)
RBC # BLD: 4.03 M/UL — SIGNIFICANT CHANGE UP (ref 3.8–5.2)
RBC # FLD: 11.6 % — SIGNIFICANT CHANGE UP (ref 10.3–14.5)
SODIUM SERPL-SCNC: 138 MMOL/L — SIGNIFICANT CHANGE UP (ref 135–145)
WBC # BLD: 6.31 K/UL — SIGNIFICANT CHANGE UP (ref 3.8–10.5)
WBC # FLD AUTO: 6.31 K/UL — SIGNIFICANT CHANGE UP (ref 3.8–10.5)

## 2022-12-06 PROCEDURE — 99233 SBSQ HOSP IP/OBS HIGH 50: CPT

## 2022-12-06 PROCEDURE — 99232 SBSQ HOSP IP/OBS MODERATE 35: CPT | Mod: GC

## 2022-12-06 RX ORDER — AMLODIPINE BESYLATE 2.5 MG/1
10 TABLET ORAL AT BEDTIME
Refills: 0 | Status: DISCONTINUED | OUTPATIENT
Start: 2022-12-07 | End: 2022-12-10

## 2022-12-06 RX ADMIN — POLYETHYLENE GLYCOL 3350 17 GRAM(S): 17 POWDER, FOR SOLUTION ORAL at 17:28

## 2022-12-06 RX ADMIN — ENOXAPARIN SODIUM 40 MILLIGRAM(S): 100 INJECTION SUBCUTANEOUS at 17:28

## 2022-12-06 RX ADMIN — AMLODIPINE BESYLATE 10 MILLIGRAM(S): 2.5 TABLET ORAL at 05:10

## 2022-12-06 RX ADMIN — SENNA PLUS 2 TABLET(S): 8.6 TABLET ORAL at 21:53

## 2022-12-06 RX ADMIN — LISINOPRIL 40 MILLIGRAM(S): 2.5 TABLET ORAL at 05:10

## 2022-12-06 RX ADMIN — Medication 100 MILLIGRAM(S): at 11:38

## 2022-12-06 RX ADMIN — Medication 1 MILLIGRAM(S): at 11:38

## 2022-12-06 RX ADMIN — QUETIAPINE FUMARATE 12.5 MILLIGRAM(S): 200 TABLET, FILM COATED ORAL at 21:53

## 2022-12-06 RX ADMIN — Medication 25 MILLIGRAM(S): at 05:10

## 2022-12-06 RX ADMIN — POLYETHYLENE GLYCOL 3350 17 GRAM(S): 17 POWDER, FOR SOLUTION ORAL at 05:10

## 2022-12-06 RX ADMIN — Medication 25 MILLIGRAM(S): at 17:28

## 2022-12-06 NOTE — PROGRESS NOTE ADULT - SUBJECTIVE AND OBJECTIVE BOX
Patient is a 67y old  Female who presents with a chief complaint of cerebellar IPH with IVH    Subjective/ROS    Patient seen on way to therapies. Still reports month as February even upon requestioning. Patient is pleasant. Spoke with daughter in patient room about TDD and f/u neurology testing for memory.   Daughter has been staying overnight as patient's confusion is exacerbated in the evenings.     Patient denies current pain, HA, dizziness  Denies CP/dyspnea  Denies abdominal pain or nausea    Vital Signs Last 24 Hrs  T(C): 36.8 (06 Dec 2022 07:46), Max: 36.8 (06 Dec 2022 07:46)  T(F): 98.2 (06 Dec 2022 07:46), Max: 98.2 (06 Dec 2022 07:46)  HR: 68 (06 Dec 2022 15:03) (63 - 76)  BP: 147/73 (06 Dec 2022 15:03) (95/64 - 147/73)  RR: 15 (06 Dec 2022 07:46) (15 - 15)  SpO2: 98% (06 Dec 2022 07:46) (95% - 98%)    Parameters below as of 06 Dec 2022 07:46  Patient On (Oxygen Delivery Method): room air           Physical Exam:   · Constitutional Comments	awake AO to self and hospital. mood stable NAD.   · Eyes	EOMI no nystagmus  · Respiratory	NAD, no conversational dyspnea  · Respiratory Comments	good effort, RRR, s1/s2  · Gastrointestinal	soft; nontender; nondistended  · Motor	no calf swelling BL. BLUE antigravity in PE; on admission normal tone and 5/5s.   · Sensory	intact to LT    RECENT LABS                        12.8   6.31  )-----------( 413      ( 06 Dec 2022 06:13 )             37.7     12-06    138  |  103  |  11  ----------------------------<  112<H>  4.1   |  28  |  0.66    Ca    9.4      06 Dec 2022 06:13    TPro  7.3  /  Alb  3.5  /  TBili  0.4  /  DBili  x   /  AST  39  /  ALT  113<H>  /  AlkPhos  79  12-06    CAPILLARY BLOOD GLUCOSE    MEDICATIONS  (STANDING):  enoxaparin Injectable 40 milliGRAM(s) SubCutaneous <User Schedule>  folic acid 1 milliGRAM(s) Oral daily  hydrALAZINE 25 milliGRAM(s) Oral two times a day  influenza  Vaccine (HIGH DOSE) 0.7 milliLiter(s) IntraMuscular once  lisinopril 40 milliGRAM(s) Oral daily  multivitamin 1 Tablet(s) Oral daily  polyethylene glycol 3350 17 Gram(s) Oral two times a day  QUEtiapine 12.5 milliGRAM(s) Oral at bedtime  senna 2 Tablet(s) Oral at bedtime  thiamine 100 milliGRAM(s) Oral daily    MEDICATIONS  (PRN):  acetaminophen     Tablet .. 650 milliGRAM(s) Oral every 6 hours PRN Mild Pain (1 - 3)  QUEtiapine 12.5 milliGRAM(s) Oral every 6 hours PRN agitation     Patient is a 67y old  Female who presents with a chief complaint of cerebellar IPH with IVH    Subjective/ROS    Patient seen on way to therapies. Still reports month as February even upon requestioning. Patient is pleasant. Spoke with daughter in patient room about TDD and f/u neurology testing for memory.   Daughter has been staying overnight as patient's confusion is exacerbated in the evenings.     Patient denies current pain, HA, dizziness  Denies CP/dyspnea  Denies abdominal pain or nausea    Vital Signs Last 24 Hrs  T(C): 36.8 (06 Dec 2022 07:46), Max: 36.8 (06 Dec 2022 07:46)  T(F): 98.2 (06 Dec 2022 07:46), Max: 98.2 (06 Dec 2022 07:46)  HR: 68 (06 Dec 2022 15:03) (63 - 76)  BP: 147/73 (06 Dec 2022 15:03) (95/64 - 147/73)  RR: 15 (06 Dec 2022 07:46) (15 - 15)  SpO2: 98% (06 Dec 2022 07:46) (95% - 98%)    Parameters below as of 06 Dec 2022 07:46  Patient On (Oxygen Delivery Method): room air           Physical Exam:   · Constitutional Comments	awake AO to self and hospital. mood stable NAD.   · Eyes	EOMI no nystagmus  · Respiratory	NAD, no conversational dyspnea  · Respiratory Comments	good effort, RRR, s1/s2  · Gastrointestinal	soft; nontender; nondistended  · Motor	no calf swelling BL. BLUE antigravity in PE; on admission normal tone and 5/5s.   · Sensory	    RECENT LABS                        12.8   6.31  )-----------( 413      ( 06 Dec 2022 06:13 )             37.7     12-06    138  |  103  |  11  ----------------------------<  112<H>  4.1   |  28  |  0.66    Ca    9.4      06 Dec 2022 06:13    TPro  7.3  /  Alb  3.5  /  TBili  0.4  /  DBili  x   /  AST  39  /  ALT  113<H>  /  AlkPhos  79  12-06    CAPILLARY BLOOD GLUCOSE    MEDICATIONS  (STANDING):  enoxaparin Injectable 40 milliGRAM(s) SubCutaneous <User Schedule>  folic acid 1 milliGRAM(s) Oral daily  hydrALAZINE 25 milliGRAM(s) Oral two times a day  influenza  Vaccine (HIGH DOSE) 0.7 milliLiter(s) IntraMuscular once  lisinopril 40 milliGRAM(s) Oral daily  multivitamin 1 Tablet(s) Oral daily  polyethylene glycol 3350 17 Gram(s) Oral two times a day  QUEtiapine 12.5 milliGRAM(s) Oral at bedtime  senna 2 Tablet(s) Oral at bedtime  thiamine 100 milliGRAM(s) Oral daily    MEDICATIONS  (PRN):  acetaminophen     Tablet .. 650 milliGRAM(s) Oral every 6 hours PRN Mild Pain (1 - 3)  QUEtiapine 12.5 milliGRAM(s) Oral every 6 hours PRN agitation

## 2022-12-06 NOTE — PROGRESS NOTE ADULT - ATTENDING COMMENTS
Patient seen during PT session   Improved balance noted- will benefit from rollator    2. Patient states that she feels well. Denies any acute issues.   Memory impaired, unable to recall month, but some improvement with cues   Per daughter confusion worse at night - but not agitated and can be redirected    3. Labs today with improved LFTs    4. Disp: dc planning home 12/10 with home care. D/W daughter who will coordinate 24 hr supervision . Discussed neurology follow up as outpatient for memory impairments   5. Case d/w hospitalist as BP appears to be trending down and medications may need to titrated

## 2022-12-06 NOTE — PROGRESS NOTE ADULT - SUBJECTIVE AND OBJECTIVE BOX
Medicine Progress Note    Patient is a 67y old  Female who presents with a chief complaint of cerebellar IPH with IVH (05 Dec 2022 17:09)      SUBJECTIVE / OVERNIGHT EVENTS:  seen and examined  Chart reviewed  No overnight events  Limb weakness improving with therapy  BM+  No pain  No complaints  BP soft meds adjusted    ADDITIONAL REVIEW OF SYSTEMS:  no fever/chills/CP/sob/palpitation/dizziness/ abd pain/nausea/vomiting/diarrhea/constipation/headaches. all other ROS neg    MEDICATIONS  (STANDING):  enoxaparin Injectable 40 milliGRAM(s) SubCutaneous <User Schedule>  folic acid 1 milliGRAM(s) Oral daily  hydrALAZINE 25 milliGRAM(s) Oral two times a day  influenza  Vaccine (HIGH DOSE) 0.7 milliLiter(s) IntraMuscular once  lisinopril 40 milliGRAM(s) Oral daily  multivitamin 1 Tablet(s) Oral daily  polyethylene glycol 3350 17 Gram(s) Oral two times a day  QUEtiapine 12.5 milliGRAM(s) Oral at bedtime  senna 2 Tablet(s) Oral at bedtime  thiamine 100 milliGRAM(s) Oral daily    MEDICATIONS  (PRN):  acetaminophen     Tablet .. 650 milliGRAM(s) Oral every 6 hours PRN Mild Pain (1 - 3)  QUEtiapine 12.5 milliGRAM(s) Oral every 6 hours PRN agitation    CAPILLARY BLOOD GLUCOSE        I&O's Summary      PHYSICAL EXAM:  Vital Signs Last 24 Hrs  T(C): 36.8 (06 Dec 2022 07:46), Max: 36.8 (06 Dec 2022 07:46)  T(F): 98.2 (06 Dec 2022 07:46), Max: 98.2 (06 Dec 2022 07:46)  HR: 76 (06 Dec 2022 07:46) (63 - 76)  BP: 95/64 (06 Dec 2022 07:46) (95/64 - 137/77)  BP(mean): --  RR: 15 (06 Dec 2022 07:46) (15 - 15)  SpO2: 98% (06 Dec 2022 07:46) (95% - 98%)    Parameters below as of 06 Dec 2022 07:46  Patient On (Oxygen Delivery Method): room air      GENERAL: Not in distress. Alert    HEENT: clear conjuctiva, MMM. no pallor or icterus  CARDIOVASCULAR: RRR S1, S2. No murmur/rubs/gallop  LUNGS: BLAE+, no rales, no wheezing, no rhonchi.    ABDOMEN: ND. Soft,  NT, no guarding / rebound / rigidity. BS normoactive  BACK: No spine tenderness.  EXTREMITIES: no edema. no leg or calf TP.  SKIN: warm and dry  PSYCHIATRIC: Calm.  No agitation.  CNS:AAO*1-2 with clue. moving limbs    LABS:                        12.8   6.31  )-----------( 413      ( 06 Dec 2022 06:13 )             37.7     12-06    138  |  103  |  11  ----------------------------<  112<H>  4.1   |  28  |  0.66    Ca    9.4      06 Dec 2022 06:13    TPro  7.3  /  Alb  3.5  /  TBili  0.4  /  DBili  x   /  AST  39  /  ALT  113<H>  /  AlkPhos  79  12-06              COVID-19 PCR: NotDetec (29 Nov 2022 17:45)  COVID-19 PCR: NotDetec (27 Nov 2022 18:38)      RADIOLOGY & ADDITIONAL TESTS:  Imaging from Last 24 Hours:    Electrocardiogram/QTc Interval:    COORDINATION OF CARE:  Care Discussed with Consultants/Other Providers:

## 2022-12-06 NOTE — PROGRESS NOTE ADULT - ASSESSMENT
67y with a PMH HTN and ETOH abuse who presented to [facility] on 11/18/22 s/p unwitnessed fall with confusion and found to have cerebellar IPH and IVH, s/p EVD. Hospital course complicated by UTI    # traumatic left cerebellar IPH and IVH s/p fall  continue comprehensive rehab program, PT/OT/SLP 3 hours a day, 5 days a week.  - toileting schedule, bed and chair alarm, video monitoring for safety  - Precautions: falls    # s/p fall 11/29 in rehab   - continue to reinforce safety, reorient     Etoh Abuse  - c/w thiamine, folic acid, and multivitamin  - Neuropsychology consult PRN   - social work, recreation therapy    # HTN  - amlodipine 10 mg, changed to bedtime for low BP (all 3 meds were given in AM)  - lisinopril 40 mg  - hydralazine 25mg bid  - (95/64 - 147/73) 12/6     # Pain Management:  - Tylenol PRN    # GI/Bowel:  - Senna QHS, Miralax PRN Daily    # Diet:  - regular thins     # DVT ppx:  - Lovenox  - Last Doppler on 11/24/22    #dispo 12/5 IDT  SLP mod lang deficits; severe attention & memory deficits. impulsive, decreased safety insight.   OT CG LBD and transfers, supervision for other ADLs.   PT Supervision, impulsive.   Will need 24h supervision at home   TDD 12/10    # LABS  CBC BMP 12/8

## 2022-12-06 NOTE — PROGRESS NOTE ADULT - ASSESSMENT
67 year old F PMH HTN and ETOH abuse who presented to [facility] on 11/18/22 s/p unwitnessed fall with confusion and found to have cerebellar IPH and IVH, s/p EVD now admitted to  rehab for ADL impairment     #cerebellar IPH and IVH  - Head CT 11/25: Evolving left cerebellar hematoma with surrounding lucency as seen on the prior and mass effect on the fourth ventricle. No change in appearance of the hematoma in the left cerebellum as well as trace heme in the right frontal region along the prior shunt tract  - neuro check q12 hours  - could be in setting of uncontrolled HTN vs vascular lesion (MRI showed multiple cavernomas)  - will need repeat MRI between 12/16/12/30 once hemorrhage resorbed to r/o malformation  - continue comprehensive rehab program  - fall prevention    #EtOH abuse  # cognitive impairment: likely combination of ETOH and intracranial process  - B12, FA, TSH normal  - patient's family admits to 10 glasses wine a night  - continue with seroquel qhs and q6hrs PRN  - continue thiamine, folic acid, and multivitamin  - neuropsychiatry eval  - SW eval  - cognitive remediation during therapy.   - neuro cx OP  - advised not to drive or important decision making for cognitive impairment. needs 24/7 supervision    #HTN  - BP soft. changed amlodipine 10 mg to bedtime from tomorrow  - continue lisinopril 40mg  - c/w hydralazine 25mg BID . was started on 11/30, hold hydralazine if hypotension  - SBP goal <140    #DVT ppx  - Lovenox  - no further AC/AP indicated due to IPH   - Last Doppler on 11/24/22    d/w rehab team and daughter at bedside

## 2022-12-07 ENCOUNTER — TRANSCRIPTION ENCOUNTER (OUTPATIENT)
Age: 67
End: 2022-12-07

## 2022-12-07 PROCEDURE — 99233 SBSQ HOSP IP/OBS HIGH 50: CPT

## 2022-12-07 PROCEDURE — 99232 SBSQ HOSP IP/OBS MODERATE 35: CPT

## 2022-12-07 RX ORDER — LISINOPRIL 2.5 MG/1
40 TABLET ORAL
Refills: 0 | Status: DISCONTINUED | OUTPATIENT
Start: 2022-12-07 | End: 2022-12-07

## 2022-12-07 RX ORDER — LISINOPRIL 2.5 MG/1
20 TABLET ORAL
Refills: 0 | Status: DISCONTINUED | OUTPATIENT
Start: 2022-12-08 | End: 2022-12-08

## 2022-12-07 RX ADMIN — QUETIAPINE FUMARATE 12.5 MILLIGRAM(S): 200 TABLET, FILM COATED ORAL at 21:33

## 2022-12-07 RX ADMIN — ENOXAPARIN SODIUM 40 MILLIGRAM(S): 100 INJECTION SUBCUTANEOUS at 17:20

## 2022-12-07 RX ADMIN — Medication 1 MILLIGRAM(S): at 11:50

## 2022-12-07 RX ADMIN — LISINOPRIL 40 MILLIGRAM(S): 2.5 TABLET ORAL at 05:37

## 2022-12-07 RX ADMIN — Medication 1 TABLET(S): at 11:49

## 2022-12-07 RX ADMIN — SENNA PLUS 2 TABLET(S): 8.6 TABLET ORAL at 21:33

## 2022-12-07 RX ADMIN — Medication 25 MILLIGRAM(S): at 05:37

## 2022-12-07 RX ADMIN — AMLODIPINE BESYLATE 10 MILLIGRAM(S): 2.5 TABLET ORAL at 21:32

## 2022-12-07 RX ADMIN — Medication 100 MILLIGRAM(S): at 11:49

## 2022-12-07 RX ADMIN — POLYETHYLENE GLYCOL 3350 17 GRAM(S): 17 POWDER, FOR SOLUTION ORAL at 05:37

## 2022-12-07 NOTE — DISCHARGE NOTE PROVIDER - CARE PROVIDERS DIRECT ADDRESSES
,DirectAddress_Unknown,DirectAddress_Unknown,[email or web address]

## 2022-12-07 NOTE — PROGRESS NOTE ADULT - ASSESSMENT
67y with a PMH HTN and ETOH abuse who presented to [facility] on 11/18/22 s/p unwitnessed fall with confusion and found to have cerebellar IPH and IVH, s/p EVD. Hospital course complicated by UTI    # traumatic left cerebellar IPH and IVH s/p fall  continue comprehensive rehab program, PT/OT/SLP 3 hours a day, 5 days a week.  - toileting schedule, bed and chair alarm, video monitoring for safety  - Precautions: falls     Etoh Abuse  - c/w thiamine, folic acid, and multivitamin  - Neuropsychology consult PRN   - social work, recreation therapy    # HTN  - amlodipine 10 mg bedtime- for low BP  - lisinopril 40 mg  - hydralazine 25mg bid-stop  -BP 110s in therapy-closely monitor today, avoid hypotension.     # Pain Management:  - Tylenol PRN    # GI/Bowel:  - Senna QHS, Miralax PRN Daily    # Diet:  - regular thins     # DVT ppx:  - Lovenox  - Last Doppler on 11/24/22    #dispo 12/5 IDT  SLP mod lang deficits; severe attention & memory deficits. impulsive, decreased safety insight.   OT CG LBD and transfers, supervision for other ADLs.   PT Supervision, impulsive.   Will need 24h supervision at home   TDD 12/10    # LABS  CBC BMP 12/8

## 2022-12-07 NOTE — PROGRESS NOTE ADULT - ASSESSMENT
67 year old F PMH HTN and ETOH abuse who presented to [facility] on 11/18/22 s/p unwitnessed fall with confusion and found to have cerebellar IPH and IVH, s/p EVD now admitted to  rehab for ADL impairment     #cerebellar IPH and IVH  - Head CT 11/25: Evolving left cerebellar hematoma with surrounding lucency as seen on the prior and mass effect on the fourth ventricle. No change in appearance of the hematoma in the left cerebellum as well as trace heme in the right frontal region along the prior shunt tract  - neuro check q12 hours  - could be in setting of uncontrolled HTN vs vascular lesion (MRI showed multiple cavernomas)  - will need repeat MRI between 12/16/12/30 once hemorrhage resorbed to r/o malformation  - continue comprehensive rehab program  - fall prevention    #EtOH abuse  # cognitive impairment: likely combination of ETOH and intracranial process  - B12, FA, TSH normal  - patient's family admits to 10 glasses wine a night  - continue with seroquel qhs and q6hrs PRN  - continue thiamine, folic acid, and multivitamin  - neuropsychiatry eval  - SW eval  - cognitive remediation during therapy.   - neuro cx OP  - advised not to drive or important decision making for cognitive impairment. needs 24/7 supervision    #HTN  - BP soft. changed amlodipine 10 mg to bedtime starting from tonight. she got amlo last dose yesterday morning  - continue lisinopril 40mg. changed to lisinopril 20 mg daily at 12 pm with holding <120 systolic  - DC hydralazine 25mg BID . got one dose this am  - SBP goal <130 [ h/o ICH and multiple cavernomas]    #DVT ppx  - Lovenox  - no further AC/AP indicated due to IPH   - Last Doppler on 11/24/22    d/w rehab team and daughter at bedside

## 2022-12-07 NOTE — DISCHARGE NOTE PROVIDER - NSDCQMMRS_NEU_ALL_CORE
4 - Moderately severe disability. Unable to own bodily needs without assistance and unable to walk unassisted 3 - Moderate disability. Requires some help, but able to walk unassisted.

## 2022-12-07 NOTE — PROGRESS NOTE ADULT - NS ATTEND AMEND GEN_ALL_CORE FT
Patient seen at bedside this am   Daughter present at bedside  Overall had a good night and slept well.   Still needs cues for orientation - states February     2. HTN - will monitor on 2 medications     3. Labs in am     4. Discussed with daughter at bedside

## 2022-12-07 NOTE — DISCHARGE NOTE PROVIDER - NSDCMRMEDTOKEN_GEN_ALL_CORE_FT
acetaminophen 325 mg oral tablet: 2 tab(s) orally every 6 hours, As needed, Mild Pain (1 - 3)  amLODIPine 10 mg oral tablet: 1 tab(s) orally once a day  enoxaparin: 40 unit(s) subcutaneous once a day for DVT ppx  folic acid 1 mg oral tablet: 1 tab(s) orally once a day  lisinopril 40 mg oral tablet: 1 tab(s) orally once a day  Multiple Vitamins with Minerals oral tablet: 1 tab(s) orally once a day  polyethylene glycol 3350 oral powder for reconstitution: 17 gram(s) orally 2 times a day  senna leaf extract oral tablet: 2 tab(s) orally once a day (at bedtime)  SEROquel 25 mg oral tablet: 0.5 tab(s) orally once a day (at bedtime)  thiamine 100 mg oral tablet: 1 tab(s) orally once a day   amLODIPine 10 mg oral tablet: 1 tab(s) orally once a day (at bedtime) MDD:1 tab (10mg)  folic acid 1 mg oral tablet: 1 tab(s) orally once a day  Multiple Vitamins with Minerals oral tablet: 1 tab(s) orally once a day MDD:1 tab  thiamine 100 mg oral tablet: 1 tab(s) orally once a day   amLODIPine 10 mg oral tablet: 1 tab(s) orally once a day (at bedtime) MDD:1 tab (10mg)  folic acid 1 mg oral tablet: 1 tab(s) orally once a day  Multiple Vitamins with Minerals oral tablet: 1 tab(s) orally once a day MDD:1 tab  QUEtiapine 25 mg oral tablet: 0.5 tab(s) orally once a day (at bedtime) MDD:1/2 tab/day as needed at bedtime  thiamine 100 mg oral tablet: 1 tab(s) orally once a day   amLODIPine 10 mg oral tablet: 1 tab(s) orally once a day (at bedtime) MDD:1 tab (10mg)  folic acid 1 mg oral tablet: 1 tab(s) orally once a day  lisinopril 20 mg oral tablet: 1 tab(s) orally once a day at noon MDD:1 tab  Multiple Vitamins with Minerals oral tablet: 1 tab(s) orally once a day MDD:1 tab  QUEtiapine 25 mg oral tablet: 0.5 tab(s) orally once a day (at bedtime) MDD:1/2 tab/day as needed at bedtime  thiamine 100 mg oral tablet: 1 tab(s) orally once a day

## 2022-12-07 NOTE — DISCHARGE NOTE PROVIDER - HOSPITAL COURSE
HPI:  Diana Yadav is a 68yo Female PMH HTN, dyslipidemia, new onset dementia, Etoh 1/2 - 1 bottle of wine daily, who presents from [facility] 11/18/22 s/p an unwitnessed fall with trauma to the front of the head.  Patient developed increasing confusion the following day. 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 EVD placed 11/18 (removed 11/23).     EEG monitoring without epileptiform activity recorded x1 day. Patient initially placed on CIWA and did not display signs of withdrawal. CTA without evidence of malformation; MRI+ multiple cavernomas which are likely the etiology of ICH. She was Rx for UTI 11/26, with final dose of Ceftriaxone on 11/28.     Patient was optimized for d/c to acute rehab on 11/29/22.  (29 Nov 2022 12:39)    Rehab course significant for a fall on her night of arrival. Patient remains confused in the evenings so her daughter has been staying with her overnight to reorient. She presented with initial hypertension which trended down to soft BPs requiring a reduction of lisinopril and discontinuation of hydralazine in addition to staggering medication times to avoid hypotension during therapies.     Functional Status:  SLP: mod lang deficits; severe memory deficits. impulsive, decreased safety insight.   OT: CG LBD and transfers, supervision for other ADLs.   PT: Supervision, impulsive.   Will need 24h supervision at home     All other medical co-morbidities were stable. Patient tolerated course of inpatient PT/OT/SLP rehab with significant improvements and met rehab goals prior to discharge. Discharge instructions were discussed with patient and family. All questions answered and all concerns addressed. Patient was medically cleared for discharge to home with home care and supervision.    Outpatient Follow-ups:    David Franco (DO)  Neurology; Vascular Neurology  [address] [facility] [place], [address]  [place] [address]  Phone: [phone]  Fax: [phone]  Follow Up Time: 1 week    Abdifatah Molina ()  Cardiology; Internal Medicine  [address]  [place] [address]  Phone: [phone]  Fax: [phone]  Follow Up Time: 2 weeks    Dr. Salgado  Physical medicine and Rehabilitation  [address] [place] [address]       HPI:  Diana Yadav is a 66yo Female PMH HTN, dyslipidemia, new onset dementia, Etoh 1/2 - 1 bottle of wine daily, who presents from [facility] 11/18/22 s/p an unwitnessed fall with trauma to the front of the head.  Patient developed increasing confusion the following day. 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 EVD placed 11/18 (removed 11/23).     EEG monitoring without epileptiform activity recorded x1 day. Patient initially placed on CIWA and did not display signs of withdrawal. CTA without evidence of malformation; MRI+ multiple cavernomas which are likely the etiology of ICH. She was Rx for UTI 11/26, with final dose of Ceftriaxone on 11/28.     Patient was optimized for d/c to acute rehab on 11/29/22.  (29 Nov 2022 12:39)    Rehab course significant for a fall on her night of arrival. Patient with confusion in the evenings; her daughter has been staying with her overnight to reorient. PM confusion improving. She presented with initial hypertension which trended down to soft BPs requiring discontinuation of hydralazine and lisinopril. Patient remains on amlodipine.    Functional Status:  SLP: mod lang deficits; severe memory deficits. impulsive, decreased safety insight.   OT: CG LBD and transfers, supervision for other ADLs.   PT: Supervision, impulsive.   Will need 24h supervision at home     All other medical co-morbidities were stable. Patient tolerated course of inpatient PT/OT/SLP rehab with significant improvements and met rehab goals prior to discharge. Discharge instructions were discussed with patient and family. All questions answered and all concerns addressed. Patient was medically cleared for discharge to home with home care and supervision.    Outpatient Follow-ups:    David Franco (DO)  Neurology; Vascular Neurology  [address] [facility], [address]  [place] [address]  Phone: [phone]  Fax: [phone]  Follow Up Time: 1 week    Abdifatah Mloina ()  Cardiology; Internal Medicine  [address] [facility], [address]  [place] [address]  Phone: [phone]  Fax: [phone]  Follow Up Time: 2 weeks    Dr. Salgado  Physical medicine and Rehabilitation  [address] [place] [address] [place] [address]       HPI:  Diana Yadav is a 66yo Female PMH HTN, dyslipidemia, new onset dementia, Etoh 1/2 - 1 bottle of wine daily, who presents from [facility] 11/18/22 s/p an unwitnessed fall with trauma to the front of the head.  Patient developed increasing confusion the following day. 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 EVD placed 11/18 (removed 11/23).     EEG monitoring without epileptiform activity recorded x1 day. Patient initially placed on CIWA and did not display signs of withdrawal. CTA without evidence of malformation; MRI+ multiple cavernomas which are likely the etiology of ICH. She was Rx for UTI 11/26, with final dose of Ceftriaxone on 11/28.     Patient was optimized for d/c to acute rehab on 11/29/22.  (29 Nov 2022 12:39)    Rehab course significant for a fall on her night of arrival. Patient with confusion in the evenings; her daughter has been staying with her overnight to reorient. PM confusion improving. She presented with initial hypertension which trended down normal Bp requiring discontinuation of hydralazine and lisinopril. Patient remains on amlodipine.    Functional Status:  SLP: mod lang deficits; severe memory deficits. impulsive, decreased safety insight.   OT: CG LBD and transfers, supervision for other ADLs.   PT: Supervision, impulsive.   Will need 24h supervision at home     All other medical co-morbidities were stable. Patient tolerated course of inpatient PT/OT/SLP rehab with significant improvements and met rehab goals prior to discharge. Discharge instructions were discussed with patient and family. All questions answered and all concerns addressed. Patient was medically cleared for discharge to home with home care and supervision.    Outpatient Follow-ups:    David Franco (DO)  Neurology; Vascular Neurology  [address] [facility], [address]  [place] [address]  Phone: [phone]  Fax: [phone]  Follow Up Time: 1 week    Abdifatah Molina ()  Cardiology; Internal Medicine  [address] [facility], [address]  [place] [address]  Phone: [phone]  Fax: [phone]  Follow Up Time: 2 weeks    Dr. Salgado  Physical medicine and Rehabilitation  [address] [place] [address]       HPI:  Diana Yadav is a 68yo Female PMH HTN, dyslipidemia, new onset dementia, Etoh 1/2 - 1 bottle of wine daily, who presents from [facility] 11/18/22 s/p an unwitnessed fall with trauma to the front of the head.  Patient developed increasing confusion the following day. 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 EVD placed 11/18 (removed 11/23).     EEG monitoring without epileptiform activity recorded x1 day. Patient initially placed on CIWA and did not display signs of withdrawal. CTA without evidence of malformation; MRI+ multiple cavernomas which are likely the etiology of ICH. She was Rx for UTI 11/26, with final dose of Ceftriaxone on 11/28.     Patient was optimized for d/c to acute rehab on 11/29/22.  (29 Nov 2022 12:39)    Rehab course significant for a fall on her night of arrival. Patient with confusion in the evenings; her daughter has been staying with her overnight to reorient. PM confusion improving. She presented with initial hypertension which trended down normal Bp requiring discontinuation of hydralazine and a decreased dose of lisinopril. Patient remains on amlodipine.    Functional Status:  SLP: mod lang deficits; severe memory deficits. impulsive, decreased safety insight.   OT: CG LBD and transfers, supervision for other ADLs.   PT: Supervision, impulsive.   Will need 24h supervision at home     All other medical co-morbidities were stable. Patient tolerated course of inpatient PT/OT/SLP rehab with significant improvements and met rehab goals prior to discharge. Discharge instructions were discussed with patient and family. All questions answered and all concerns addressed. Patient was medically cleared for discharge to home with home care and supervision.    Outpatient Follow-ups:    David Franco (DO)  Neurology; Vascular Neurology  [address] [facility], [address]  [place] [address]  Phone: [phone]  Fax: [phone]  Follow Up Time: 1 week    Abdifatah Molina ()  Cardiology; Internal Medicine  [address] [facility], [address]  [place] [address]  Phone: [phone]  Fax: [phone]  Follow Up Time: 2 weeks    Dr. Salgado  Physical medicine and Rehabilitation  [address] [place] [address] [place] [address]

## 2022-12-07 NOTE — DISCHARGE NOTE PROVIDER - NSDCCPGOAL_GEN_ALL_CORE_FT
CC: Lump in right groin area.    HPI:  Melody presents with the following    1. Groin lump  Patient presents with a lump in her right groin that she noticed 1 week ago.  It started back pain which spread to her groin area but noticed a knot.  She states that she was constipated at the time and when that resolved her lump decreased in size.  Patient denies any pain in the lower abdomen.  Did not injure herself or lift anything heavy.    2. Screening for colon cancer  Patient's most recent colonoscopy was at the age of 67.  10-year follow-up screening colonoscopy recommended.  Patient has not had a stool test.  Patient is concerned as her brother had similar symptoms and diagnosed with cancer.    3. Constipation, unspecified constipation type  Patient has a history of chronic constipation, diagnosed with IBS many years ago.  She has been having difficulty with constipation alternating diarrhea.  Patient denies blood in the stool.    4. SI (sacroiliac) pain  Patient has been taking increased amount of Tylenol and ibuprofen for right-sided SI joint pain.  Worried about taking over-the-counter medications.      Patient Active Problem List    Diagnosis Date Noted   • Groin lump 06/09/2022   • SI (sacroiliac) pain 06/09/2022   • BMI 26.0-26.9,adult 05/20/2022   • Osteopenia of left thigh 05/20/2022   • Hypokalemia 05/20/2022   • History of renal disease 05/20/2022   • Cerebral atrophy (HCC) 03/16/2022   • Osteoarthritis of right knee 06/15/2021   • Long term (current) use of oral hypoglycemic drugs 10/21/2020   • Non-toxic multinodular goiter 01/29/2020   • Risk for falls 07/12/2019   • Dysphagia 03/29/2019   • Type 2 diabetes mellitus without complication, without long-term current use of insulin ([facility]) 01/06/2017   • Dyslipidemia 01/06/2017   • Multiple sclerosis ([facility]) 01/06/2017   • Mood disorder ([facility]) 01/06/2017   • Schatzki's ring 01/06/2017   • Hiatal hernia 01/06/2017       Current Outpatient Medications    Medication Sig Dispense Refill   • glucose blood strip Freestyle InsuLinx Test Strips   USE TO TEST THREE TIMES DAILY     • FreeStyle Lancets Misc 28 guage     • diclofenac sodium (VOLTAREN) 1 % Gel Apply 2 g topically 4 times a day as needed (moderate pain). 100 g 1   • Empagliflozin-metFORMIN HCl ER (SYNJARDY XR) 12.5-1000 MG TABLET SR 24 HR Take 1 tablet by mouth daily 100 Tablet 2   • simvastatin (ZOCOR) 40 MG Tab Take 1 Tablet by mouth every evening. 100 Tablet 2   • Teriflunomide (AUBAGIO) 14 MG Tab 1 Tablet every day.     • zolpidem (AMBIEN) 5 MG Tab Take 1 tablet by mouth every night at bedtime as needed for insomnia fir 30 days. 30 Tablet 1   • buPROPion SR (WELLBUTRIN-SR) 150 MG TABLET SR 12 HR sustained-release tablet Take 1 tablet by mouth every morning. 90 Tablet 2   • meloxicam (MOBIC) 15 MG tablet meloxicam 15 mg tablet   TAKE 1 TABLET BY MOUTH EVERY DAY     • modafinil (PROVIGIL) 200 MG Tab modafinil 200 mg tablet   TAKE ONE TABLET BY MOUTH ONE TIME DAILY     • omeprazole (PRILOSEC) 20 MG delayed-release capsule 20 mg every day.     • lamoTRIgine (LAMICTAL) 100 MG Tab Take 200 mg by mouth every day.     • paroxetine (PAXIL) 40 MG tablet Take 1 Tab by mouth every day. 30 Tab 3     No current facility-administered medications for this visit.         Allergies as of 06/09/2022   • (No Known Allergies)        Social History     Socioeconomic History   • Marital status:      Spouse name: Not on file   • Number of children: Not on file   • Years of education: Not on file   • Highest education level: 12th grade   Occupational History   • Not on file   Tobacco Use   • Smoking status: Never Smoker   • Smokeless tobacco: Never Used   Vaping Use   • Vaping Use: Never used   Substance and Sexual Activity   • Alcohol use: Yes     Alcohol/week: 0.6 - 1.2 oz     Types: 1 - 2 Glasses of wine per week   • Drug use: No     Comment: CBD stick no THC   • Sexual activity: Never     Partners: Male   Other Topics  Concern   • Not on file   Social History Narrative   • Not on file     Social Determinants of Health     Financial Resource Strain: Not on file   Food Insecurity: Not on file   Transportation Needs: Not on file   Physical Activity: Not on file   Stress: Not on file   Social Connections: Not on file   Intimate Partner Violence: Not on file   Housing Stability: Not on file       Family History   Problem Relation Age of Onset   • Colon Cancer Mother    • Heart Disease Mother         Cardiomyopathy   • Heart Attack Father    • Heart Disease Father    • No Known Problems Sister    • Cancer Brother         Skin   • Other Brother         Gaulbladder issues   • Cancer Maternal Grandmother         GI cancer   • No Known Problems Maternal Grandfather    • No Known Problems Paternal Grandmother    • No Known Problems Paternal Grandfather    • Other Daughter         Fibromyalgia   • Bipolar disorder Daughter    • ADHD Son    • Depression Son    • Hypertension Son        History reviewed. No pertinent surgical history.    ROS: Positive ROS per HPI.  Denies any Headache,Chest pain,  Shortness of breath,  Abdominal pain, Changes of bowel or bladder, Lower ext edema, Fevers, Nights sweats, Weight Changes, Focal weakness or numbness.  All other systems are negative.    /70 (BP Location: Left arm, Patient Position: Sitting, BP Cuff Size: Adult)   Pulse 92   Temp (!) 35.6 °C (96 °F) (Temporal)   Resp 15   Ht 1.524 m (5')   Wt 60.9 kg (134 lb 4.8 oz)   SpO2 95%   BMI 26.23 kg/m²      Physical Exam:  Gen:         Alert and oriented, No apparent distress.  HEENT:   Perrla, TM clear,  Oralpharynx no erythema or exudates.  Neck:       No Jugular venous distension, Lymphadenopathy, Thyromegaly, Bruits.  Lungs:     Clear to auscultation bilaterally, no wheezing rhonchi or crackles.  CV:          Regular rate and rhythm. No murmurs, rubs or gallops.  Abd:         Soft non tender, non distended. Normal active bowel sounds.  Mild  fullness noted in right lower quadrant above right inguinal canal.  Reducible.  Nontender.            Ext:          No clubbing, cyanosis, edema.      Assessment and Plan.   74 y.o. female presenting with the following.     1. Groin lump  Will monitor at this time.    2. Screening for colon cancer    - OCCULT BLOOD FECES IMMUNOASSAY; Future    3. Constipation, unspecified constipation type  Patient will start a bowel regimen to include Metamucil/Benefiber, Colace.  Increase fluid hydration.    4. SI (sacroiliac) pain  Prescription provided for Voltaren gel.    My total time spent caring for the patient on the day of the encounter was 30 minutes.   This does not include time spent on separately billable procedures/tests.       To get better and follow your care plan as instructed.

## 2022-12-07 NOTE — DISCHARGE NOTE PROVIDER - NSDCHHASSISTILLNESS_GEN_ALL_CORE
Requires supervision. Severe memory deficits. Impulsive. Impaired balance. Poor insight into deficits.  due to recent medical course, intracerebral bleed

## 2022-12-07 NOTE — DISCHARGE NOTE PROVIDER - CARE PROVIDER_API CALL
David Franco (DO)  Neurology; Vascular Neurology  [address] [facility], [address]  [place] [address]  Phone: [phone]  Fax: [phone]  Follow Up Time: 1 week    Abdifatah Molina (DO)  Cardiology; Internal Medicine  [address] [facility], [address]  [place] [address]  Phone: [phone]  Fax: [phone]  Follow Up Time: 2 weeks    Janey Salgado  Physical Medicine and Rehabilitation  [address] [place] [address]  Phone: [phone]  Fax: [phone]  Follow Up Time: 1 month

## 2022-12-07 NOTE — DISCHARGE NOTE PROVIDER - NSDCACTIVITY_GEN_ALL_CORE
Do not drive or operate machinery/Showering allowed/Do not make important decisions/Driving allowed/Walking - Indoors allowed/No heavy lifting/straining

## 2022-12-07 NOTE — DISCHARGE NOTE PROVIDER - NSDCCPCAREPLAN_GEN_ALL_CORE_FT
PRINCIPAL DISCHARGE DIAGNOSIS  Diagnosis: Hemorrhage in the brain  Assessment and Plan of Treatment: Intraventricular hemorrhage (bleed into the spaces of the brain) and an intraparenchymal hemorrhage (bleed into brain matter itself). Due to this you have difficulty with balance, memory, and other areas of cognition. Please follow-up with your neurologist listed in this discharge document. It is recommended you ask the neurologist about referrals for neurological evaluation concerning your memory loss prior to this fall and brain bleed. There may be need for medication intervention for your memory separate from the deficits seen in rehabilitation.  During the acute rehab window it is difficult to differentiate.      SECONDARY DISCHARGE DIAGNOSES  Diagnosis: Hypertension  Assessment and Plan of Treatment: Please continue to take your lisinopril 20mg during the day and amlodipine 10pm at bedtime. You are  no longer taking hydralazine because your blood pressures came down from your initial admission and were too low on all three medications.     PRINCIPAL DISCHARGE DIAGNOSIS  Diagnosis: Hemorrhage in the brain  Assessment and Plan of Treatment: Intraventricular hemorrhage (bleed into the spaces of the brain) and an intraparenchymal hemorrhage (bleed into brain matter itself). Due to this you have difficulty with balance, memory, and other areas of cognition. Please follow-up with your neurologist listed in this discharge document. It is recommended you ask the neurologist about referrals for neurological evaluation concerning your memory loss prior to this fall and brain bleed. There may be need for medication intervention for your memory separate from the deficits seen in rehabilitation.  During the acute rehab window it is difficult to differentiate.      SECONDARY DISCHARGE DIAGNOSES  Diagnosis: Hypertension  Assessment and Plan of Treatment: Please continue to take amlodipine 10pm at bedtime. You are  no longer taking hydralazine or lisinopril because your blood pressures came down from your initial admission. You must follow-up with your primary care physician within 3-5 days of discharge to re-check blood pressures and possibly adjust medications.     PRINCIPAL DISCHARGE DIAGNOSIS  Diagnosis: Hemorrhage in the brain  Assessment and Plan of Treatment: Intraventricular hemorrhage (bleed into the spaces of the brain) and an intraparenchymal hemorrhage (bleed into brain matter itself). Due to this you have difficulty with balance, memory, and other areas of cognition. Please follow-up with your neurologist listed in this discharge document. Neurological evaluation concerning your memory loss prior to this fall and brain bleed.      SECONDARY DISCHARGE DIAGNOSES  Diagnosis: Hypertension  Assessment and Plan of Treatment: Please continue to take amlodipine 10pm at bedtime. You are  no longer taking hydralazine or lisinopril because your blood pressures came down from your initial admission. You must follow-up with your primary care physician within 3-5 days of discharge to re-check blood pressures and possibly adjust medications.     PRINCIPAL DISCHARGE DIAGNOSIS  Diagnosis: Hemorrhage in the brain  Assessment and Plan of Treatment: Intraventricular hemorrhage (bleed into the spaces of the brain) and an intraparenchymal hemorrhage (bleed into brain matter itself). Due to this you have difficulty with balance, memory, and other areas of cognition. Please follow-up with your neurologist listed in this discharge document. Neurological evaluation concerning your memory loss prior to this fall and brain bleed.      SECONDARY DISCHARGE DIAGNOSES  Diagnosis: Hypertension  Assessment and Plan of Treatment: Please continue to take amlodipine 10pm at bedtime and lisinopril 20mg at lunchtime/noon. You are  no longer taking hydralazine because your blood pressures came down from your initial admission. You must follow-up with your primary care physician within 3-5 days of discharge to re-check blood pressures and possibly adjust medications.

## 2022-12-07 NOTE — PROGRESS NOTE ADULT - SUBJECTIVE AND OBJECTIVE BOX
CHIEF COMPLAINT: s/p IPH      Subjective/ROS    Patient seen in chair. NAD overnight, no new deficits this am, comfortable, pleasant, fluent language, sl confused to time, 'in hospital'. Follows commands.   Patient denies pain, HA, dizziness  Denies CP/dyspnea  Denies abdominal pain or nausea  BM yesterday. No dysuria.  Tolerating therapy. BPs boderline low-meds being adjusted by hospitalist-asymptomatic in PT w/BP 110s.      VITALS  Vital Signs Last 24 Hrs  T(C): 36.7 (07 Dec 2022 08:25), Max: 36.7 (07 Dec 2022 08:25)  T(F): 98 (07 Dec 2022 08:25), Max: 98 (07 Dec 2022 08:25)  HR: 56 (07 Dec 2022 08:25) (56 - 68)  BP: 108/72 (07 Dec 2022 08:25) (108/72 - 147/73)  BP(mean): --  RR: 16 (07 Dec 2022 08:25) (16 - 16)  SpO2: 97% (07 Dec 2022 08:25) (97% - 98%)    Parameters below as of 07 Dec 2022 08:25  Patient On (Oxygen Delivery Method): room air        RECENT LABS                        12.8   6.31  )-----------( 413      ( 06 Dec 2022 06:13 )             37.7     12-06    138  |  103  |  11  ----------------------------<  112<H>  4.1   |  28  |  0.66    Ca    9.4      06 Dec 2022 06:13    TPro  7.3  /  Alb  3.5  /  TBili  0.4  /  DBili  x   /  AST  39  /  ALT  113<H>  /  AlkPhos  79  12-06      LIVER FUNCTIONS - ( 06 Dec 2022 06:13 )  Alb: 3.5 g/dL / Pro: 7.3 g/dL / ALK PHOS: 79 U/L / ALT: 113 U/L / AST: 39 U/L / GGT: x           Physical Exam:   · Constitutional Comments	awake AO to self and hospital. mood stable NAD.   · Eyes	EOMI no nystagmus  · Respiratory	NAD, no conversational dyspnea  · Respiratory Comments	good effort, RRR, s1/s2  · Gastrointestinal	soft; nontender; nondistended  · Motor	no calf swelling BL. BLUE antigravity in PE; on admission normal tone and 5/5s.   · Sensory	    CURRENT MEDICATIONS  MEDICATIONS  (STANDING):  amLODIPine   Tablet 10 milliGRAM(s) Oral at bedtime  enoxaparin Injectable 40 milliGRAM(s) SubCutaneous <User Schedule>  folic acid 1 milliGRAM(s) Oral daily  influenza  Vaccine (HIGH DOSE) 0.7 milliLiter(s) IntraMuscular once  lisinopril 40 milliGRAM(s) Oral <User Schedule>  multivitamin 1 Tablet(s) Oral daily  polyethylene glycol 3350 17 Gram(s) Oral two times a day  QUEtiapine 12.5 milliGRAM(s) Oral at bedtime  senna 2 Tablet(s) Oral at bedtime  thiamine 100 milliGRAM(s) Oral daily    MEDICATIONS  (PRN):  acetaminophen     Tablet .. 650 milliGRAM(s) Oral every 6 hours PRN Mild Pain (1 - 3)  QUEtiapine 12.5 milliGRAM(s) Oral every 6 hours PRN agitation      Continue comprehensive acute rehab program consisting of 3hrs/day of OT/PT and SLP.

## 2022-12-07 NOTE — DISCHARGE NOTE PROVIDER - PROVIDER TOKENS
PROVIDER:[TOKEN:[[id number]],FOLLOWUP:[1 week]],PROVIDER:[TOKEN:[[id number]],FOLLOWUP:[2 weeks]],PROVIDER:[TOKEN:[[id number]],FOLLOWUP:[1 month]]
By signing this assessment you are acknowledging and agree with the diagnosis/diagnoses assigned by the Registered Dietitian

## 2022-12-07 NOTE — PROGRESS NOTE ADULT - SUBJECTIVE AND OBJECTIVE BOX
Medicine Progress Note    Patient is a 67y old  Female who presents with a chief complaint of cerebellar IPH with IVH (07 Dec 2022 09:37)      SUBJECTIVE / OVERNIGHT EVENTS:  no complaints    ADDITIONAL REVIEW OF SYSTEMS:  no fever/chills/CP/sob/palpitation/dizziness/ abd pain/nausea/vomiting/diarrhea/constipation/headaches. all other ROS neg    MEDICATIONS  (STANDING):  amLODIPine   Tablet 10 milliGRAM(s) Oral at bedtime  enoxaparin Injectable 40 milliGRAM(s) SubCutaneous <User Schedule>  folic acid 1 milliGRAM(s) Oral daily  influenza  Vaccine (HIGH DOSE) 0.7 milliLiter(s) IntraMuscular once  lisinopril 20 milliGRAM(s) Oral <User Schedule>  multivitamin 1 Tablet(s) Oral daily  polyethylene glycol 3350 17 Gram(s) Oral two times a day  QUEtiapine 12.5 milliGRAM(s) Oral at bedtime  senna 2 Tablet(s) Oral at bedtime  thiamine 100 milliGRAM(s) Oral daily    MEDICATIONS  (PRN):  acetaminophen     Tablet .. 650 milliGRAM(s) Oral every 6 hours PRN Mild Pain (1 - 3)  QUEtiapine 12.5 milliGRAM(s) Oral every 6 hours PRN agitation    CAPILLARY BLOOD GLUCOSE        I&O's Summary      PHYSICAL EXAM:  Vital Signs Last 24 Hrs  T(C): 36.7 (07 Dec 2022 08:25), Max: 36.7 (07 Dec 2022 08:25)  T(F): 98 (07 Dec 2022 08:25), Max: 98 (07 Dec 2022 08:25)  HR: 56 (07 Dec 2022 08:25) (56 - 68)  BP: 108/72 (07 Dec 2022 08:25) (108/72 - 147/73)  BP(mean): --  RR: 16 (07 Dec 2022 08:25) (16 - 16)  SpO2: 97% (07 Dec 2022 08:25) (97% - 98%)    Parameters below as of 07 Dec 2022 08:25  Patient On (Oxygen Delivery Method): room air    GENERAL: Not in distress. Alert    HEENT: clear conjuctiva, MMM. no pallor or icterus  CARDIOVASCULAR: RRR S1, S2. No murmur/rubs/gallop  LUNGS: BLAE+, no rales, no wheezing, no rhonchi.    ABDOMEN: ND. Soft,  NT, no guarding / rebound / rigidity. BS normoactive  BACK: No spine tenderness.  EXTREMITIES: no edema. no leg or calf TP.  SKIN: warm and dry  PSYCHIATRIC: Calm.  No agitation.  CNS:AAO*2 with clue. moving limbs      LABS:                        12.8   6.31  )-----------( 413      ( 06 Dec 2022 06:13 )             37.7     12-06    138  |  103  |  11  ----------------------------<  112<H>  4.1   |  28  |  0.66    Ca    9.4      06 Dec 2022 06:13    TPro  7.3  /  Alb  3.5  /  TBili  0.4  /  DBili  x   /  AST  39  /  ALT  113<H>  /  AlkPhos  79  12-06              COVID-19 PCR: NotDetec (29 Nov 2022 17:45)  COVID-19 PCR: NotDetec (27 Nov 2022 18:38)      RADIOLOGY & ADDITIONAL TESTS:  Imaging from Last 24 Hours:    Electrocardiogram/QTc Interval:    COORDINATION OF CARE:  Care Discussed with Consultants/Other Providers:

## 2022-12-07 NOTE — DISCHARGE NOTE PROVIDER - NSDCFUADDINST_GEN_ALL_CORE_FT
Follow-up with primary care provider 3-5 days after discharge for Blood Pressure checks and possible medication adjustments. You will have to call to make this appointment.

## 2022-12-08 ENCOUNTER — TRANSCRIPTION ENCOUNTER (OUTPATIENT)
Age: 67
End: 2022-12-08

## 2022-12-08 LAB
ALBUMIN SERPL ELPH-MCNC: 3.7 G/DL — SIGNIFICANT CHANGE UP (ref 3.3–5)
ALP SERPL-CCNC: 80 U/L — SIGNIFICANT CHANGE UP (ref 40–120)
ALT FLD-CCNC: 100 U/L — HIGH (ref 10–45)
ANION GAP SERPL CALC-SCNC: 5 MMOL/L — SIGNIFICANT CHANGE UP (ref 5–17)
AST SERPL-CCNC: 36 U/L — SIGNIFICANT CHANGE UP (ref 10–40)
BASOPHILS # BLD AUTO: 0.04 K/UL — SIGNIFICANT CHANGE UP (ref 0–0.2)
BASOPHILS NFR BLD AUTO: 0.9 % — SIGNIFICANT CHANGE UP (ref 0–2)
BILIRUB SERPL-MCNC: 0.3 MG/DL — SIGNIFICANT CHANGE UP (ref 0.2–1.2)
BUN SERPL-MCNC: 13 MG/DL — SIGNIFICANT CHANGE UP (ref 7–23)
CALCIUM SERPL-MCNC: 9.2 MG/DL — SIGNIFICANT CHANGE UP (ref 8.4–10.5)
CHLORIDE SERPL-SCNC: 106 MMOL/L — SIGNIFICANT CHANGE UP (ref 96–108)
CO2 SERPL-SCNC: 30 MMOL/L — SIGNIFICANT CHANGE UP (ref 22–31)
CREAT SERPL-MCNC: 0.71 MG/DL — SIGNIFICANT CHANGE UP (ref 0.5–1.3)
EGFR: 94 ML/MIN/1.73M2 — SIGNIFICANT CHANGE UP
EOSINOPHIL # BLD AUTO: 0.15 K/UL — SIGNIFICANT CHANGE UP (ref 0–0.5)
EOSINOPHIL NFR BLD AUTO: 3.3 % — SIGNIFICANT CHANGE UP (ref 0–6)
GLUCOSE SERPL-MCNC: 106 MG/DL — HIGH (ref 70–99)
HCT VFR BLD CALC: 38.1 % — SIGNIFICANT CHANGE UP (ref 34.5–45)
HGB BLD-MCNC: 12.8 G/DL — SIGNIFICANT CHANGE UP (ref 11.5–15.5)
IMM GRANULOCYTES NFR BLD AUTO: 0.4 % — SIGNIFICANT CHANGE UP (ref 0–0.9)
LYMPHOCYTES # BLD AUTO: 1.61 K/UL — SIGNIFICANT CHANGE UP (ref 1–3.3)
LYMPHOCYTES # BLD AUTO: 35.7 % — SIGNIFICANT CHANGE UP (ref 13–44)
MCHC RBC-ENTMCNC: 32.9 PG — SIGNIFICANT CHANGE UP (ref 27–34)
MCHC RBC-ENTMCNC: 33.6 GM/DL — SIGNIFICANT CHANGE UP (ref 32–36)
MCV RBC AUTO: 97.9 FL — SIGNIFICANT CHANGE UP (ref 80–100)
MONOCYTES # BLD AUTO: 0.37 K/UL — SIGNIFICANT CHANGE UP (ref 0–0.9)
MONOCYTES NFR BLD AUTO: 8.2 % — SIGNIFICANT CHANGE UP (ref 2–14)
NEUTROPHILS # BLD AUTO: 2.32 K/UL — SIGNIFICANT CHANGE UP (ref 1.8–7.4)
NEUTROPHILS NFR BLD AUTO: 51.5 % — SIGNIFICANT CHANGE UP (ref 43–77)
NRBC # BLD: 0 /100 WBCS — SIGNIFICANT CHANGE UP (ref 0–0)
PLATELET # BLD AUTO: 362 K/UL — SIGNIFICANT CHANGE UP (ref 150–400)
POTASSIUM SERPL-MCNC: 4.5 MMOL/L — SIGNIFICANT CHANGE UP (ref 3.5–5.3)
POTASSIUM SERPL-SCNC: 4.5 MMOL/L — SIGNIFICANT CHANGE UP (ref 3.5–5.3)
PROT SERPL-MCNC: 7.3 G/DL — SIGNIFICANT CHANGE UP (ref 6–8.3)
RBC # BLD: 3.89 M/UL — SIGNIFICANT CHANGE UP (ref 3.8–5.2)
RBC # FLD: 11.6 % — SIGNIFICANT CHANGE UP (ref 10.3–14.5)
SODIUM SERPL-SCNC: 141 MMOL/L — SIGNIFICANT CHANGE UP (ref 135–145)
WBC # BLD: 4.51 K/UL — SIGNIFICANT CHANGE UP (ref 3.8–10.5)
WBC # FLD AUTO: 4.51 K/UL — SIGNIFICANT CHANGE UP (ref 3.8–10.5)

## 2022-12-08 PROCEDURE — 99232 SBSQ HOSP IP/OBS MODERATE 35: CPT

## 2022-12-08 PROCEDURE — 99232 SBSQ HOSP IP/OBS MODERATE 35: CPT | Mod: GC

## 2022-12-08 RX ORDER — AMLODIPINE BESYLATE 2.5 MG/1
1 TABLET ORAL
Qty: 30 | Refills: 0
Start: 2022-12-08 | End: 2023-01-06

## 2022-12-08 RX ORDER — THIAMINE MONONITRATE (VIT B1) 100 MG
1 TABLET ORAL
Qty: 30 | Refills: 0
Start: 2022-12-08 | End: 2023-01-06

## 2022-12-08 RX ORDER — FOLIC ACID 0.8 MG
1 TABLET ORAL
Qty: 30 | Refills: 0
Start: 2022-12-08 | End: 2023-01-06

## 2022-12-08 RX ORDER — QUETIAPINE FUMARATE 200 MG/1
0.5 TABLET, FILM COATED ORAL
Qty: 15 | Refills: 0
Start: 2022-12-08 | End: 2023-01-06

## 2022-12-08 RX ORDER — MULTIVIT-MIN/FERROUS GLUCONATE 9 MG/15 ML
1 LIQUID (ML) ORAL
Qty: 30 | Refills: 0
Start: 2022-12-08 | End: 2023-01-06

## 2022-12-08 RX ADMIN — Medication 1 TABLET(S): at 12:36

## 2022-12-08 RX ADMIN — Medication 1 MILLIGRAM(S): at 12:35

## 2022-12-08 RX ADMIN — Medication 100 MILLIGRAM(S): at 12:35

## 2022-12-08 RX ADMIN — AMLODIPINE BESYLATE 10 MILLIGRAM(S): 2.5 TABLET ORAL at 21:12

## 2022-12-08 RX ADMIN — POLYETHYLENE GLYCOL 3350 17 GRAM(S): 17 POWDER, FOR SOLUTION ORAL at 17:11

## 2022-12-08 RX ADMIN — ENOXAPARIN SODIUM 40 MILLIGRAM(S): 100 INJECTION SUBCUTANEOUS at 17:11

## 2022-12-08 RX ADMIN — SENNA PLUS 2 TABLET(S): 8.6 TABLET ORAL at 21:13

## 2022-12-08 RX ADMIN — QUETIAPINE FUMARATE 12.5 MILLIGRAM(S): 200 TABLET, FILM COATED ORAL at 21:13

## 2022-12-08 RX ADMIN — POLYETHYLENE GLYCOL 3350 17 GRAM(S): 17 POWDER, FOR SOLUTION ORAL at 08:30

## 2022-12-08 NOTE — PROGRESS NOTE ADULT - SUBJECTIVE AND OBJECTIVE BOX
Patient is a 67y old  Female who presents with a chief complaint of cerebellar IPH with IVH    Subjective/ROS    Patient seen and examined in bed and witnessed in PT. Patient in good spirits. Daughter, bedside. Patient with some balance and neglect with ambulation. Conversationally recall improving slightly. No concerns or questions.     Patient denies current pain, HA, dizziness, she and daughter endorse improved sleep  Denies CP/dyspnea  Denies abdominal pain or nausea    Vital Signs Last 24 Hrs  T(C): 36.7 (08 Dec 2022 10:32), Max: 36.8 (07 Dec 2022 21:30)  T(F): 98 (08 Dec 2022 10:32), Max: 98.2 (07 Dec 2022 21:30)  HR: 79 (08 Dec 2022 10:32) (59 - 79)  BP: 109/71 (08 Dec 2022 12:32) (109/71 - 134/89)  RR: 14 (08 Dec 2022 10:32) (14 - 14)  SpO2: 97% (08 Dec 2022 10:32) (97% - 97%)    Parameters below as of 08 Dec 2022 10:32  Patient On (Oxygen Delivery Method): room air             Physical Exam:   · Constitutional Comments	awake AO to self and hospital + [place].   · Eyes	EOMI no nystagmus  · Respiratory	NAD, no conversational dyspnea, CT BL   · Respiratory Comments	good effort, RRR, s1/s2  · Gastrointestinal	soft; nontender tp: nondistended  · Motor	no calf swelling BL. Observed walking, marching in hallways  · Sensory	    RECENT LABS                        12.8   4.51  )-----------( 362      ( 08 Dec 2022 06:45 )             38.1     12-08    141  |  106  |  13  ----------------------------<  106<H>  4.5   |  30  |  0.71    Ca    9.2      08 Dec 2022 06:45    TPro  7.3  /  Alb  3.7  /  TBili  0.3  /  DBili  x   /  AST  36  /  ALT  100<H>  /  AlkPhos  80  12-08    MEDICATIONS  (STANDING):  amLODIPine   Tablet 10 milliGRAM(s) Oral at bedtime  enoxaparin Injectable 40 milliGRAM(s) SubCutaneous <User Schedule>  folic acid 1 milliGRAM(s) Oral daily  influenza  Vaccine (HIGH DOSE) 0.7 milliLiter(s) IntraMuscular once  lisinopril 20 milliGRAM(s) Oral <User Schedule>  multivitamin 1 Tablet(s) Oral daily  polyethylene glycol 3350 17 Gram(s) Oral two times a day  QUEtiapine 12.5 milliGRAM(s) Oral at bedtime  senna 2 Tablet(s) Oral at bedtime  thiamine 100 milliGRAM(s) Oral daily    MEDICATIONS  (PRN):  acetaminophen     Tablet .. 650 milliGRAM(s) Oral every 6 hours PRN Mild Pain (1 - 3)  QUEtiapine 12.5 milliGRAM(s) Oral every 6 hours PRN agitation

## 2022-12-08 NOTE — PROGRESS NOTE ADULT - ASSESSMENT
67 year old F PMH HTN and ETOH abuse who presented to [facility] on 11/18/22 s/p unwitnessed fall with confusion and found to have cerebellar IPH and IVH, s/p EVD now admitted to  rehab for ADL impairment     #cerebellar IPH and IVH  - Head CT 11/25: Evolving left cerebellar hematoma with surrounding lucency as seen on the prior and mass effect on the fourth ventricle. No change in appearance of the hematoma in the left cerebellum as well as trace heme in the right frontal region along the prior shunt tract  - neuro check q12 hours  - could be in setting of uncontrolled HTN vs vascular lesion (MRI showed multiple cavernomas)  - will need repeat MRI between 12/16/12/30 once hemorrhage resorbed to r/o malformation  - continue comprehensive rehab program  - fall prevention    #EtOH abuse  # cognitive impairment: likely combination of ETOH and intracranial process  - B12, FA, TSH normal  - patient's family admits to 10 glasses wine a night  - continue with seroquel qhs and q6hrs PRN  - continue thiamine, folic acid, and multivitamin  - neuropsychiatry eval  - SW eval  - cognitive remediation during therapy.   - neuro cx OP  - advised not to drive or important decision making for cognitive impairment. needs 24/7 supervision    #HTN  - BP soft.   - lisinopril was held at 12 pm today for  systolic. was on lisinopril 40. cut back to 20 for today. dced for now.   - on amlodipine 10 mg at h/s.  - off hydralazine 25mg BID for soft BP  - SBP goal <130 [ h/o ICH and multiple cavernomas]  - patient needs to follow up her primary MD in 3 -5 days after DC to recheck BP and adjust meds    #DVT ppx  - Lovenox  - no further AC/AP indicated due to IPH   - Last Doppler on 11/24/22    d/w rehab team and daughter at bedside

## 2022-12-08 NOTE — DISCHARGE NOTE NURSING/CASE MANAGEMENT/SOCIAL WORK - NSDCVIVACCINE_GEN_ALL_CORE_FT
Pfizer-BioNTech Covid-19 Vaccine, Bivalent; 29-Nov-2022 11:57; Marlon Enciso (RN); [employer]; [id number] (Exp. Date: 12-Jan-2023); IntraMuscular; Deltoid Left.; 0.3 milliLiter(s);

## 2022-12-08 NOTE — PROGRESS NOTE ADULT - ASSESSMENT
67y with a PMH HTN and ETOH abuse who presented to [facility] on 11/18/22 s/p unwitnessed fall with confusion and found to have cerebellar IPH and IVH, s/p EVD. Hospital course complicated by UTI    # traumatic left cerebellar IPH and IVH s/p fall  continue comprehensive rehab program, PT/OT/SLP 3 hours a day, 5 days a week.  - toileting schedule, bed and chair alarm, video monitoring for safety  - Precautions: falls    # s/p fall 11/29 in rehab   - continue to reinforce safety, reorient     Etoh Abuse  - c/w thiamine, folic acid, and multivitamin  - Neuropsychology consult PRN   - social work, recreation therapy    # HTN  - amlodipine 10 mg qhs  - lisinopril 20 mg 12/7  - dc hydralazine 12/7  - BPs improved     # Pain Management:  - Tylenol PRN    # GI/Bowel:  - Senna QHS, Miralax PRN Daily    # Diet:  - regular thins     # DVT ppx:  - Lovenox  - Last Doppler on 11/24/22    #dispo 12/5 IDT  SLP mod lang deficits; severe attention & memory deficits. impulsive, decreased safety insight.   OT CG LBD and transfers, supervision for other ADLs.   PT Supervision, impulsive.   Will need 24h supervision at home   TDD 12/10     #Labs  AM 12/8 stable

## 2022-12-08 NOTE — DISCHARGE NOTE NURSING/CASE MANAGEMENT/SOCIAL WORK - PATIENT PORTAL LINK FT
You can access the FollowMyHealth Patient Portal offered by [facility] by registering at the following website: [email or web address]. By joining [employer] [facility]’s FollowMyHealth portal, you will also be able to view your health information using other applications (apps) compatible with our system.

## 2022-12-08 NOTE — DISCHARGE NOTE NURSING/CASE MANAGEMENT/SOCIAL WORK - NSDCPEFALRISK_GEN_ALL_CORE
For information on Fall & Injury Prevention, visit: [email or web address] OR  [email or web address] OR  https://www.cdc.gov/steadi/patient.html

## 2022-12-08 NOTE — PROGRESS NOTE ADULT - ATTENDING COMMENTS
Patient seen at bedside this am and  noticed walking in hallway with PT   Balance much improved    Per daughter patient slept well overnight  Patient denies any acute pain/dizziness/palpitations  Denies abdominal pain     Smiling, pleasant.     2. Labs stable     3, BP acceptable on current 2 medications .     4. Continue rehab program   Dc planning home 12/10 -

## 2022-12-08 NOTE — PROGRESS NOTE ADULT - SUBJECTIVE AND OBJECTIVE BOX
Medicine Progress Note    Patient is a 67y old  Female who presents with a chief complaint of cerebellar IPH with IVH (08 Dec 2022 13:54)      SUBJECTIVE / OVERNIGHT EVENTS:  seen and examined  Chart reviewed  No overnight events  Limb weakness improving with therapy  BM+  No pain  No complaints  BP soft. lisinopril was held this afternoon.     ADDITIONAL REVIEW OF SYSTEMS:  no fever/chills/CP/sob/palpitation/dizziness/ abd pain/nausea/vomiting/diarrhea/constipation/headaches. all other ROS neg    MEDICATIONS  (STANDING):  amLODIPine   Tablet 10 milliGRAM(s) Oral at bedtime  enoxaparin Injectable 40 milliGRAM(s) SubCutaneous <User Schedule>  folic acid 1 milliGRAM(s) Oral daily  influenza  Vaccine (HIGH DOSE) 0.7 milliLiter(s) IntraMuscular once  multivitamin 1 Tablet(s) Oral daily  polyethylene glycol 3350 17 Gram(s) Oral two times a day  QUEtiapine 12.5 milliGRAM(s) Oral at bedtime  senna 2 Tablet(s) Oral at bedtime  thiamine 100 milliGRAM(s) Oral daily    MEDICATIONS  (PRN):  acetaminophen     Tablet .. 650 milliGRAM(s) Oral every 6 hours PRN Mild Pain (1 - 3)  QUEtiapine 12.5 milliGRAM(s) Oral every 6 hours PRN agitation    CAPILLARY BLOOD GLUCOSE        I&O's Summary      PHYSICAL EXAM:  Vital Signs Last 24 Hrs  T(C): 36.7 (08 Dec 2022 10:32), Max: 36.8 (07 Dec 2022 21:30)  T(F): 98 (08 Dec 2022 10:32), Max: 98.2 (07 Dec 2022 21:30)  HR: 79 (08 Dec 2022 10:32) (59 - 79)  BP: 109/71 (08 Dec 2022 12:32) (109/71 - 134/89)  BP(mean): --  RR: 14 (08 Dec 2022 10:32) (14 - 14)  SpO2: 97% (08 Dec 2022 10:32) (97% - 97%)    Parameters below as of 08 Dec 2022 10:32  Patient On (Oxygen Delivery Method): room air      GENERAL: Not in distress. Alert    HEENT: clear conjuctiva, MMM. no pallor or icterus  CARDIOVASCULAR: RRR S1, S2. No murmur/rubs/gallop  LUNGS: BLAE+, no rales, no wheezing, no rhonchi.    ABDOMEN: ND. Soft,  NT, no guarding / rebound / rigidity. BS normoactive  BACK: No spine tenderness.  EXTREMITIES: no edema. no leg or calf TP.  SKIN: warm and dry  PSYCHIATRIC: Calm.  No agitation.    LABS:                        12.8   4.51  )-----------( 362      ( 08 Dec 2022 06:45 )             38.1     12-08    141  |  106  |  13  ----------------------------<  106<H>  4.5   |  30  |  0.71    Ca    9.2      08 Dec 2022 06:45    TPro  7.3  /  Alb  3.7  /  TBili  0.3  /  DBili  x   /  AST  36  /  ALT  100<H>  /  AlkPhos  80  12-08              COVID-19 PCR: NotDetec (29 Nov 2022 17:45)  COVID-19 PCR: NotDetec (27 Nov 2022 18:38)      RADIOLOGY & ADDITIONAL TESTS:  Imaging from Last 24 Hours:    Electrocardiogram/QTc Interval:    COORDINATION OF CARE:  Care Discussed with Consultants/Other Providers:

## 2022-12-09 PROCEDURE — 99232 SBSQ HOSP IP/OBS MODERATE 35: CPT

## 2022-12-09 PROCEDURE — 99232 SBSQ HOSP IP/OBS MODERATE 35: CPT | Mod: GC

## 2022-12-09 RX ORDER — QUETIAPINE FUMARATE 200 MG/1
12.5 TABLET, FILM COATED ORAL AT BEDTIME
Refills: 0 | Status: DISCONTINUED | OUTPATIENT
Start: 2022-12-09 | End: 2022-12-10

## 2022-12-09 RX ORDER — QUETIAPINE FUMARATE 200 MG/1
0.5 TABLET, FILM COATED ORAL
Qty: 5 | Refills: 0
Start: 2022-12-09 | End: 2022-12-18

## 2022-12-09 RX ORDER — LISINOPRIL 2.5 MG/1
20 TABLET ORAL
Refills: 0 | Status: DISCONTINUED | OUTPATIENT
Start: 2022-12-09 | End: 2022-12-10

## 2022-12-09 RX ORDER — LISINOPRIL 2.5 MG/1
1 TABLET ORAL
Qty: 30 | Refills: 0
Start: 2022-12-09 | End: 2023-01-07

## 2022-12-09 RX ADMIN — POLYETHYLENE GLYCOL 3350 17 GRAM(S): 17 POWDER, FOR SOLUTION ORAL at 17:21

## 2022-12-09 RX ADMIN — Medication 1 TABLET(S): at 12:21

## 2022-12-09 RX ADMIN — POLYETHYLENE GLYCOL 3350 17 GRAM(S): 17 POWDER, FOR SOLUTION ORAL at 05:29

## 2022-12-09 RX ADMIN — AMLODIPINE BESYLATE 10 MILLIGRAM(S): 2.5 TABLET ORAL at 21:03

## 2022-12-09 RX ADMIN — Medication 100 MILLIGRAM(S): at 12:21

## 2022-12-09 RX ADMIN — Medication 1 MILLIGRAM(S): at 12:22

## 2022-12-09 RX ADMIN — ENOXAPARIN SODIUM 40 MILLIGRAM(S): 100 INJECTION SUBCUTANEOUS at 17:21

## 2022-12-09 RX ADMIN — LISINOPRIL 20 MILLIGRAM(S): 2.5 TABLET ORAL at 12:38

## 2022-12-09 NOTE — PROGRESS NOTE ADULT - ASSESSMENT
67 year old F PMH HTN and ETOH abuse who presented to [facility] on 11/18/22 s/p unwitnessed fall with confusion and found to have cerebellar IPH and IVH, s/p EVD now admitted to  rehab for ADL impairment     #cerebellar IPH and IVH  - Head CT 11/25: Evolving left cerebellar hematoma with surrounding lucency as seen on the prior and mass effect on the fourth ventricle. No change in appearance of the hematoma in the left cerebellum as well as trace heme in the right frontal region along the prior shunt tract  - neuro check q12 hours  - could be in setting of uncontrolled HTN vs vascular lesion (MRI showed multiple cavernomas)  - will need repeat MRI between 12/16/12/30 once hemorrhage resorbed to r/o malformation  - continue comprehensive rehab program  - fall prevention    #EtOH abuse  # cognitive impairment: likely combination of ETOH and intracranial process  - B12, FA, TSH normal  - patient's family admits to 10 glasses wine a night  - continue with seroquel qhs and q6hrs PRN  - continue thiamine, folic acid, and multivitamin  - neuropsychiatry eval  - SW eval  - cognitive remediation during therapy.   - neuro cx OP  - advised not to drive or important decision making for cognitive impairment. needs 24/7 supervision    #HTN  - BP better controlled  - c/w lisinopril 20 at 12 pm   - c/w amlodipine 10 mg at h/s.  - off hydralazine 25mg BID for soft BP  - SBP goal <130 [ h/o ICH and multiple cavernomas]  - patient needs to follow up her primary MD in 3 -5 days after DC to recheck BP and adjust meds    #DVT ppx  - Lovenox  - no further AC/AP indicated due to IPH   - Last Doppler on 11/24/22    d/w rehab team and daughter at bedside

## 2022-12-09 NOTE — PROGRESS NOTE ADULT - ASSESSMENT
67y with a PMH HTN and ETOH abuse who presented to [facility] on 11/18/22 s/p unwitnessed fall with confusion and found to have cerebellar IPH and IVH, s/p EVD. Hospital course complicated by UTI    # traumatic left cerebellar IPH and IVH s/p fall  continue comprehensive rehab program, PT/OT/SLP 3 hours a day, 5 days a week.  - toileting schedule, bed and chair alarm, video monitoring for safety  - Precautions: falls    # s/p fall 11/29 in rehab   - continue to reinforce safety, reorient     Etoh Abuse  - c/w thiamine, folic acid, and multivitamin  - Neuropsychology consult PRN   - social work, recreation therapy    # HTN  - amlodipine 10 mg qhs  - dc hydralazine 12/7  - lisinopril dc 12/8   - BP some elevation 12/9 while only on amlodipine. Lisinopril added in for noon dosing per hospitalist.     # Pain Management:  - Tylenol PRN    # GI/Bowel:  - Senna QHS, Miralax PRN Daily    # Diet:  - regular thins     # DVT ppx:  - Lovenox  - Last Doppler on 11/24/22    #dispo 12/5 IDT  SLP mod lang deficits; severe attention & memory deficits. impulsive, decreased safety insight.   OT CG LBD and transfers, supervision for other ADLs.   PT Supervision, impulsive.   Will need 24h supervision at home   DC 12/10 to home with

## 2022-12-09 NOTE — PROGRESS NOTE ADULT - SUBJECTIVE AND OBJECTIVE BOX
Patient is a 67y old  Female who presents with a chief complaint of cerebellar IPH with IVH    Subjective/ROS    Patient seen and examined bedside in . Patient in good spirits. Daughter, bedside endorses patient slept through the night. Discussed with daughter that we will monitor her blood pressure today for any final changes to her meds before being sent home. Discussed Seroquel change to PRN at bedtime and same to at home on discharge. All questions and concerns answered.     Patient denies current pain, HA, dizziness   Denies CP/dyspnea  Denies abdominal pain or nausea    Vital Signs Last 24 Hrs  T(C): 36.4 (09 Dec 2022 07:58), Max: 36.8 (08 Dec 2022 21:10)  T(F): 97.6 (09 Dec 2022 07:58), Max: 98.2 (08 Dec 2022 21:10)  HR: 87 (09 Dec 2022 12:22) (63 - 87)  BP: 134/84 (09 Dec 2022 12:22) (134/84 - 151/85)  RR: 15 (09 Dec 2022 12:22) (15 - 16)  SpO2: 98% (09 Dec 2022 12:22) (98% - 98%)    Parameters below as of 09 Dec 2022 12:22  Patient On (Oxygen Delivery Method): room air           Physical Exam:   · Constitutional Comments	awake AO to self and hospital. Still confused on month.   · Eyes	EOMI no nystagmus  · Respiratory	NAD, no conversational dyspnea, CTA BL   · Respiratory Comments	good effort, RRR, s1/s2  · Gastrointestinal	soft; nontender tp: nondistended  · Motor	Observed walking in read with Walker and close supervision  	    RECENT LABS                        12.8   4.51  )-----------( 362      ( 08 Dec 2022 06:45 )             38.1     12-08    141  |  106  |  13  ----------------------------<  106<H>  4.5   |  30  |  0.71    Ca    9.2      08 Dec 2022 06:45    TPro  7.3  /  Alb  3.7  /  TBili  0.3  /  DBili  x   /  AST  36  /  ALT  100<H>  /  AlkPhos  80  12-08    MEDICATIONS  (STANDING):  amLODIPine   Tablet 10 milliGRAM(s) Oral at bedtime  enoxaparin Injectable 40 milliGRAM(s) SubCutaneous <User Schedule>  folic acid 1 milliGRAM(s) Oral daily  influenza  Vaccine (HIGH DOSE) 0.7 milliLiter(s) IntraMuscular once  lisinopril 20 milliGRAM(s) Oral <User Schedule>  multivitamin 1 Tablet(s) Oral daily  polyethylene glycol 3350 17 Gram(s) Oral two times a day  senna 2 Tablet(s) Oral at bedtime  thiamine 100 milliGRAM(s) Oral daily    MEDICATIONS  (PRN):  acetaminophen     Tablet .. 650 milliGRAM(s) Oral every 6 hours PRN Mild Pain (1 - 3)  QUEtiapine 12.5 milliGRAM(s) Oral at bedtime PRN agitation, insomnia       Patient is a 67y old  Female who presents with a chief complaint of cerebellar IPH with IVH    Subjective/ROS    Patient seen and examined bedside in . Patient in good spirits. Daughter, bedside endorses patient slept through the night. Discussed with daughter that we will monitor her blood pressure today for any final changes to her meds before being sent home. Discussed Seroquel change to PRN at bedtime and same to at home on discharge. All questions and concerns answered.     Patient denies current pain, HA, dizziness   Denies CP/dyspnea  Denies abdominal pain or nausea    Vital Signs Last 24 Hrs  T(C): 36.4 (09 Dec 2022 07:58), Max: 36.8 (08 Dec 2022 21:10)  T(F): 97.6 (09 Dec 2022 07:58), Max: 98.2 (08 Dec 2022 21:10)  HR: 87 (09 Dec 2022 12:22) (63 - 87)  BP: 134/84 (09 Dec 2022 12:22) (134/84 - 151/85)  RR: 15 (09 Dec 2022 12:22) (15 - 16)  SpO2: 98% (09 Dec 2022 12:22) (98% - 98%)    Parameters below as of 09 Dec 2022 12:22  Patient On (Oxygen Delivery Method): room air           Physical Exam:   · Constitutional Comments	awake AO to self and hospital. Still confused on month and needs cues   · Eyes	EOMI no nystagmus  · Respiratory	NAD, no conversational dyspnea, CTA BL   · Respiratory Comments	good effort, RRR, s1/s2  · Gastrointestinal	soft; nontender tp: nondistended  · Motor	Observed walking in read with Walker and close supervision  	    RECENT LABS                        12.8   4.51  )-----------( 362      ( 08 Dec 2022 06:45 )             38.1     12-08    141  |  106  |  13  ----------------------------<  106<H>  4.5   |  30  |  0.71    Ca    9.2      08 Dec 2022 06:45    TPro  7.3  /  Alb  3.7  /  TBili  0.3  /  DBili  x   /  AST  36  /  ALT  100<H>  /  AlkPhos  80  12-08    MEDICATIONS  (STANDING):  amLODIPine   Tablet 10 milliGRAM(s) Oral at bedtime  enoxaparin Injectable 40 milliGRAM(s) SubCutaneous <User Schedule>  folic acid 1 milliGRAM(s) Oral daily  influenza  Vaccine (HIGH DOSE) 0.7 milliLiter(s) IntraMuscular once  lisinopril 20 milliGRAM(s) Oral <User Schedule>  multivitamin 1 Tablet(s) Oral daily  polyethylene glycol 3350 17 Gram(s) Oral two times a day  senna 2 Tablet(s) Oral at bedtime  thiamine 100 milliGRAM(s) Oral daily    MEDICATIONS  (PRN):  acetaminophen     Tablet .. 650 milliGRAM(s) Oral every 6 hours PRN Mild Pain (1 - 3)  QUEtiapine 12.5 milliGRAM(s) Oral at bedtime PRN agitation, insomnia

## 2022-12-09 NOTE — PROGRESS NOTE ADULT - ATTENDING COMMENTS
Patient seen at bedside this am   Daughter at bedside  Patient in good spirits and feels well  Denies HA/pain   Slept well    Excited about discharge home in am . Has met rehab goals. Will need supervision at home due to memory impairments    HTN: on amlodipine. now off lisinopril. Will d/w hospitalist regarding dc meds for BP management.     Medications reviewed with daughter

## 2022-12-09 NOTE — CHART NOTE - NSCHARTNOTEFT_GEN_A_CORE
Nutrition Follow Up Note  Hospital Course   (Per Electronic Medical Record)    Source:  Patient [X]  Medical Record [X]      Diet: Diet, DASH/TLC:   Sodium & Cholesterol Restricted (12-04-22 @ 10:21) [Active]    Patient w/ adequate appetite/intake at this time consuming % of meal tray. Reports enjoying meals. No issues w/ chewing and swallowing. Denies N/V/C/D, last BM 12/8 per patient report. Encouraged patient to focus on high-protein, high-calorie foods during meals to provide energy to participate in rehab activities     Enteral/Parenteral Nutrition: Not Applicable    Current Weight: 117.9 lb on 12/6 Per nursing flowsheets , weight stable    Pertinent Medications: MEDICATIONS  (STANDING):  amLODIPine   Tablet 10 milliGRAM(s) Oral at bedtime  enoxaparin Injectable 40 milliGRAM(s) SubCutaneous <User Schedule>  folic acid 1 milliGRAM(s) Oral daily  influenza  Vaccine (HIGH DOSE) 0.7 milliLiter(s) IntraMuscular once  lisinopril 20 milliGRAM(s) Oral <User Schedule>  multivitamin 1 Tablet(s) Oral daily  polyethylene glycol 3350 17 Gram(s) Oral two times a day  QUEtiapine 12.5 milliGRAM(s) Oral at bedtime  senna 2 Tablet(s) Oral at bedtime  thiamine 100 milliGRAM(s) Oral daily    MEDICATIONS  (PRN):  acetaminophen     Tablet .. 650 milliGRAM(s) Oral every 6 hours PRN Mild Pain (1 - 3)  QUEtiapine 12.5 milliGRAM(s) Oral every 6 hours PRN agitation      Pertinent Labs:  12-08 Na141 mmol/L Glu 106 mg/dL<H> K+ 4.5 mmol/L Cr  0.71 mg/dL BUN 13 mg/dL 12-08 Alb 3.7 g/dL 11-19 Chol 250 mg/dL<H> LDL --    HDL 64 mg/dL Trig 118 mg/dL      Skin: Surgical Incision, Site right scalp     Edema: No edema noted per nursing flowsheet    Last Bowel Movement: on 12/8 per patient report    Estimated Needs:   [X] No Change Since Previous Assessment    Previous Nutrition Diagnosis:   Increased Nutrient Needs...  Increased protein & energy needs  related to increased physiological demand for nutrient  as evidenced by ICH/IVH    Nutrition Diagnosis is [X] Resolved - address w/ patient meeting goal intake >75% of meal tray     New Nutrition Diagnosis: [X] Not Applicable    Interventions:   1. Recommend Continue Nutrition Plan of Care.    Monitoring & Evaluation:   [X] Weights   [X] PO Intake   [X] Skin Integrity   [X] Follow Up (Per Protocol)  [X] Tolerance to Diet Prescription   [X] Other: Labs     Registered Dietitian/Nutritionist Remains Available.  Ekta Odonnell RD, MS, CDN    Phone# [phone]

## 2022-12-09 NOTE — PROGRESS NOTE ADULT - SUBJECTIVE AND OBJECTIVE BOX
Medicine Progress Note    Patient is a 67y old  Female who presents with a chief complaint of cerebellar IPH with IVH (09 Dec 2022 13:02)      SUBJECTIVE / OVERNIGHT EVENTS:  seen and examined  Chart reviewed  No overnight events  Limb weakness improving with therapy  BM+  No pain  No complaints    ADDITIONAL REVIEW OF SYSTEMS:  no fever/chills/CP/sob/palpitation/dizziness/ abd pain/nausea/vomiting/diarrhea/constipation/headaches. all other ROS neg    MEDICATIONS  (STANDING):  amLODIPine   Tablet 10 milliGRAM(s) Oral at bedtime  enoxaparin Injectable 40 milliGRAM(s) SubCutaneous <User Schedule>  folic acid 1 milliGRAM(s) Oral daily  influenza  Vaccine (HIGH DOSE) 0.7 milliLiter(s) IntraMuscular once  lisinopril 20 milliGRAM(s) Oral <User Schedule>  multivitamin 1 Tablet(s) Oral daily  polyethylene glycol 3350 17 Gram(s) Oral two times a day  senna 2 Tablet(s) Oral at bedtime  thiamine 100 milliGRAM(s) Oral daily    MEDICATIONS  (PRN):  acetaminophen     Tablet .. 650 milliGRAM(s) Oral every 6 hours PRN Mild Pain (1 - 3)  QUEtiapine 12.5 milliGRAM(s) Oral at bedtime PRN agitation, insomnia    CAPILLARY BLOOD GLUCOSE        I&O's Summary      PHYSICAL EXAM:  Vital Signs Last 24 Hrs  T(C): 36.4 (09 Dec 2022 07:58), Max: 36.8 (08 Dec 2022 21:10)  T(F): 97.6 (09 Dec 2022 07:58), Max: 98.2 (08 Dec 2022 21:10)  HR: 87 (09 Dec 2022 12:22) (63 - 87)  BP: 134/84 (09 Dec 2022 12:22) (134/84 - 151/85)  BP(mean): --  RR: 15 (09 Dec 2022 12:22) (15 - 16)  SpO2: 98% (09 Dec 2022 12:22) (98% - 98%)    Parameters below as of 09 Dec 2022 12:22  Patient On (Oxygen Delivery Method): room air    GENERAL: Not in distress. Alert    HEENT: clear conjuctiva, MMM. no pallor or icterus  CARDIOVASCULAR: RRR S1, S2. No murmur/rubs/gallop  LUNGS: BLAE+, no rales, no wheezing, no rhonchi.    ABDOMEN: ND. Soft,  NT, no guarding / rebound / rigidity. BS normoactive  BACK: No spine tenderness.  EXTREMITIES: no edema. no leg or calf TP.  SKIN: warm and dry  PSYCHIATRIC: Calm.  No agitation.    LABS:                        12.8   4.51  )-----------( 362      ( 08 Dec 2022 06:45 )             38.1     12-08    141  |  106  |  13  ----------------------------<  106<H>  4.5   |  30  |  0.71    Ca    9.2      08 Dec 2022 06:45    TPro  7.3  /  Alb  3.7  /  TBili  0.3  /  DBili  x   /  AST  36  /  ALT  100<H>  /  AlkPhos  80  12-08              COVID-19 PCR: NotDetec (29 Nov 2022 17:45)  COVID-19 PCR: NotDetec (27 Nov 2022 18:38)      RADIOLOGY & ADDITIONAL TESTS:  Imaging from Last 24 Hours:    Electrocardiogram/QTc Interval:    COORDINATION OF CARE:  Care Discussed with Consultants/Other Providers:

## 2022-12-10 VITALS — WEIGHT: 119.49 LBS

## 2022-12-10 PROCEDURE — 97116 GAIT TRAINING THERAPY: CPT

## 2022-12-10 PROCEDURE — 92523 SPEECH SOUND LANG COMPREHEN: CPT

## 2022-12-10 PROCEDURE — 36415 COLL VENOUS BLD VENIPUNCTURE: CPT

## 2022-12-10 PROCEDURE — 72125 CT NECK SPINE W/O DYE: CPT

## 2022-12-10 PROCEDURE — 73502 X-RAY EXAM HIP UNI 2-3 VIEWS: CPT

## 2022-12-10 PROCEDURE — 87635 SARS-COV-2 COVID-19 AMP PRB: CPT

## 2022-12-10 PROCEDURE — 92610 EVALUATE SWALLOWING FUNCTION: CPT

## 2022-12-10 PROCEDURE — 92507 TX SP LANG VOICE COMM INDIV: CPT

## 2022-12-10 PROCEDURE — 70450 CT HEAD/BRAIN W/O DYE: CPT

## 2022-12-10 PROCEDURE — 97167 OT EVAL HIGH COMPLEX 60 MIN: CPT

## 2022-12-10 PROCEDURE — 85025 COMPLETE CBC W/AUTO DIFF WBC: CPT

## 2022-12-10 PROCEDURE — 80053 COMPREHEN METABOLIC PANEL: CPT

## 2022-12-10 PROCEDURE — 99232 SBSQ HOSP IP/OBS MODERATE 35: CPT | Mod: GC

## 2022-12-10 PROCEDURE — 97163 PT EVAL HIGH COMPLEX 45 MIN: CPT

## 2022-12-10 PROCEDURE — 73030 X-RAY EXAM OF SHOULDER: CPT

## 2022-12-10 PROCEDURE — 97530 THERAPEUTIC ACTIVITIES: CPT

## 2022-12-10 PROCEDURE — 97535 SELF CARE MNGMENT TRAINING: CPT

## 2022-12-10 PROCEDURE — 73070 X-RAY EXAM OF ELBOW: CPT

## 2022-12-10 PROCEDURE — 85027 COMPLETE CBC AUTOMATED: CPT

## 2022-12-10 PROCEDURE — 99232 SBSQ HOSP IP/OBS MODERATE 35: CPT

## 2022-12-10 PROCEDURE — 80048 BASIC METABOLIC PNL TOTAL CA: CPT

## 2022-12-10 PROCEDURE — 97110 THERAPEUTIC EXERCISES: CPT

## 2022-12-10 RX ADMIN — Medication 100 MILLIGRAM(S): at 11:30

## 2022-12-10 RX ADMIN — Medication 1 TABLET(S): at 11:30

## 2022-12-10 RX ADMIN — Medication 1 MILLIGRAM(S): at 11:30

## 2022-12-10 NOTE — PROGRESS NOTE ADULT - REASON FOR ADMISSION
cerebellar IPH with IVH

## 2022-12-10 NOTE — PROGRESS NOTE ADULT - SUBJECTIVE AND OBJECTIVE BOX
Cc: Gait dysfunction    HPI: Patient with no new medical issues today.  Pain controlled, no chest pain, no N/V, no Fevers/Chills. No other new ROS  Has been tolerating rehabilitation program.    acetaminophen     Tablet .. 650 milliGRAM(s) Oral every 6 hours PRN  amLODIPine   Tablet 10 milliGRAM(s) Oral at bedtime  enoxaparin Injectable 40 milliGRAM(s) SubCutaneous <User Schedule>  folic acid 1 milliGRAM(s) Oral daily  influenza  Vaccine (HIGH DOSE) 0.7 milliLiter(s) IntraMuscular once  lisinopril 20 milliGRAM(s) Oral <User Schedule>  multivitamin 1 Tablet(s) Oral daily  polyethylene glycol 3350 17 Gram(s) Oral two times a day  QUEtiapine 12.5 milliGRAM(s) Oral at bedtime PRN  senna 2 Tablet(s) Oral at bedtime  thiamine 100 milliGRAM(s) Oral daily      T(C): 36.6 (12-10-22 @ 11:29), Max: 36.6 (12-09-22 @ 20:31)  HR: 67 (12-10-22 @ 11:29) (63 - 67)  BP: 109/67 (12-10-22 @ 11:29) (109/67 - 130/74)  RR: 16 (12-10-22 @ 11:29) (15 - 16)  SpO2: 96% (12-10-22 @ 11:29) (96% - 96%)    In NAD  HEENT- EOMI  Heart- RRR, S1S2  Lungs- CTA bl.  Abd- + BS, NT  Ext- No calf pain  Neuro- Exam unchanged          Imp: Patient with diagnosis of cerebellar IPH  admitted for comprehensive acute rehabilitation.    stable to ME home

## 2022-12-10 NOTE — PROGRESS NOTE ADULT - ASSESSMENT
67 year old F PMH HTN and ETOH abuse who presented to [facility] on 11/18/22 s/p unwitnessed fall with confusion and found to have cerebellar IPH and IVH, s/p EVD now admitted to  rehab for ADL impairment     #cerebellar IPH and IVH  - Head CT 11/25: Evolving left cerebellar hematoma with surrounding lucency as seen on the prior and mass effect on the fourth ventricle. No change in appearance of the hematoma in the left cerebellum as well as trace heme in the right frontal region along the prior shunt tract  - neuro check q12 hours  - could be in setting of uncontrolled HTN vs vascular lesion (MRI showed multiple cavernomas)  - will need repeat MRI between 12/16/12/30 once hemorrhage resorbed to r/o malformation  - continue comprehensive rehab program  - fall prevention    #EtOH abuse  # cognitive impairment: likely combination of ETOH and intracranial process  - B12, FA, TSH normal  - patient's family admits to 10 glasses wine a night  - continue with seroquel qhs and q6hrs PRN  - continue thiamine, folic acid, and multivitamin  - neuropsychiatry eval  - SW eval  - cognitive remediation during therapy.   - neuro cx OP  - advised not to drive or important decision making for cognitive impairment. needs 24/7 supervision    #HTN  - BP better controlled  - c/w lisinopril 20 at 12 pm   - c/w amlodipine 10 mg at h/s.  - off hydralazine 25mg BID for soft BP  - SBP goal <130 [ h/o ICH and multiple cavernomas]  - patient needs to follow up her primary MD in 3 -5 days after DC to recheck BP and adjust meds    #DVT ppx  - Lovenox  - no further AC/AP indicated due to IPH   - Last Doppler on 11/24/22    medically stable

## 2022-12-10 NOTE — PROGRESS NOTE ADULT - SUBJECTIVE AND OBJECTIVE BOX
Medicine Progress Note    Patient is a 67y old  Female who presents with a chief complaint of cerebellar IPH with IVH (09 Dec 2022 13:39)      SUBJECTIVE / OVERNIGHT EVENTS:  seen and examined  Chart reviewed  No overnight events  Limb weakness improving with therapy  BM+  No pain  No complaints    ADDITIONAL REVIEW OF SYSTEMS:  no fever/chills/CP/sob/palpitation/dizziness/ abd pain/nausea/vomiting/diarrhea/constipation/headaches. all other ROS neg    MEDICATIONS  (STANDING):  amLODIPine   Tablet 10 milliGRAM(s) Oral at bedtime  enoxaparin Injectable 40 milliGRAM(s) SubCutaneous <User Schedule>  folic acid 1 milliGRAM(s) Oral daily  influenza  Vaccine (HIGH DOSE) 0.7 milliLiter(s) IntraMuscular once  lisinopril 20 milliGRAM(s) Oral <User Schedule>  multivitamin 1 Tablet(s) Oral daily  polyethylene glycol 3350 17 Gram(s) Oral two times a day  senna 2 Tablet(s) Oral at bedtime  thiamine 100 milliGRAM(s) Oral daily    MEDICATIONS  (PRN):  acetaminophen     Tablet .. 650 milliGRAM(s) Oral every 6 hours PRN Mild Pain (1 - 3)  QUEtiapine 12.5 milliGRAM(s) Oral at bedtime PRN agitation, insomnia    CAPILLARY BLOOD GLUCOSE        I&O's Summary      PHYSICAL EXAM:  Vital Signs Last 24 Hrs  T(C): 36.6 (09 Dec 2022 20:31), Max: 36.6 (09 Dec 2022 20:31)  T(F): 97.8 (09 Dec 2022 20:31), Max: 97.8 (09 Dec 2022 20:31)  HR: 63 (09 Dec 2022 20:31) (63 - 87)  BP: 130/74 (09 Dec 2022 20:31) (130/74 - 134/84)  BP(mean): --  RR: 15 (09 Dec 2022 20:31) (15 - 15)  SpO2: 96% (09 Dec 2022 20:31) (96% - 98%)    Parameters below as of 09 Dec 2022 20:31  Patient On (Oxygen Delivery Method): room air    GENERAL: Not in distress. Alert    HEENT: clear conjuctiva, MMM. no pallor or icterus  CARDIOVASCULAR: RRR S1, S2. No murmur/rubs/gallop  LUNGS: BLAE+, no rales, no wheezing, no rhonchi.    ABDOMEN: ND. Soft,  NT, no guarding / rebound / rigidity. BS normoactive  BACK: No spine tenderness.  EXTREMITIES: no edema. no leg or calf TP.  SKIN: warm and dry  PSYCHIATRIC: Calm.  No agitation.  CNS: AAO*3. moving limbs    LABS:                    COVID-19 PCR: NotDetec (29 Nov 2022 17:45)  COVID-19 PCR: NotDetec (27 Nov 2022 18:38)      RADIOLOGY & ADDITIONAL TESTS:  Imaging from Last 24 Hours:    Electrocardiogram/QTc Interval:    COORDINATION OF CARE:  Care Discussed with Consultants/Other Providers:

## 2022-12-10 NOTE — PROGRESS NOTE ADULT - PROVIDER SPECIALTY LIST ADULT
Hospitalist
Hospitalist
Physiatry
Hospitalist
Rehab Medicine
Hospitalist
Physiatry
Hospitalist
Physiatry
Physiatry
Rehab Medicine

## 2022-12-29 PROBLEM — Z00.00 ENCOUNTER FOR PREVENTIVE HEALTH EXAMINATION: Status: ACTIVE | Noted: 2022-12-29

## 2023-01-22 ENCOUNTER — OUTPATIENT (OUTPATIENT)
Dept: OUTPATIENT SERVICES | Facility: HOSPITAL | Age: 68
LOS: 1 days | End: 2023-01-22
Payer: COMMERCIAL

## 2023-01-22 ENCOUNTER — APPOINTMENT (OUTPATIENT)
Dept: MRI IMAGING | Facility: IMAGING CENTER | Age: 68
End: 2023-01-22
Payer: MEDICARE

## 2023-01-22 ENCOUNTER — EMERGENCY (EMERGENCY)
Facility: HOSPITAL | Age: 68
LOS: 1 days | Discharge: HOME | End: 2023-01-22
Attending: EMERGENCY MEDICINE
Payer: COMMERCIAL

## 2023-01-22 VITALS
WEIGHT: 115.96 LBS | OXYGEN SATURATION: 96 % | TEMPERATURE: 98 F | RESPIRATION RATE: 18 BRPM | DIASTOLIC BLOOD PRESSURE: 53 MMHG | HEIGHT: 62 IN | SYSTOLIC BLOOD PRESSURE: 157 MMHG | HEART RATE: 66 BPM

## 2023-01-22 VITALS
HEART RATE: 65 BPM | SYSTOLIC BLOOD PRESSURE: 122 MMHG | OXYGEN SATURATION: 97 % | DIASTOLIC BLOOD PRESSURE: 69 MMHG | RESPIRATION RATE: 16 BRPM | TEMPERATURE: 98 F

## 2023-01-22 DIAGNOSIS — Z00.8 ENCOUNTER FOR OTHER GENERAL EXAMINATION: ICD-10-CM

## 2023-01-22 DIAGNOSIS — R42 DIZZINESS AND GIDDINESS: ICD-10-CM

## 2023-01-22 LAB
ALBUMIN SERPL ELPH-MCNC: 4.4 G/DL — SIGNIFICANT CHANGE UP (ref 3.3–5)
ALP SERPL-CCNC: 85 U/L — SIGNIFICANT CHANGE UP (ref 40–120)
ALT FLD-CCNC: 19 U/L — SIGNIFICANT CHANGE UP (ref 10–45)
ANION GAP SERPL CALC-SCNC: 14 MMOL/L — SIGNIFICANT CHANGE UP (ref 5–17)
APTT BLD: 27.5 SEC — SIGNIFICANT CHANGE UP (ref 27.5–35.5)
AST SERPL-CCNC: 21 U/L — SIGNIFICANT CHANGE UP (ref 10–40)
BASOPHILS # BLD AUTO: 0.03 K/UL — SIGNIFICANT CHANGE UP (ref 0–0.2)
BASOPHILS NFR BLD AUTO: 0.5 % — SIGNIFICANT CHANGE UP (ref 0–2)
BILIRUB SERPL-MCNC: 0.3 MG/DL — SIGNIFICANT CHANGE UP (ref 0.2–1.2)
BLD GP AB SCN SERPL QL: NEGATIVE — SIGNIFICANT CHANGE UP
BUN SERPL-MCNC: 12 MG/DL — SIGNIFICANT CHANGE UP (ref 7–23)
CALCIUM SERPL-MCNC: 9.8 MG/DL — SIGNIFICANT CHANGE UP (ref 8.4–10.5)
CHLORIDE SERPL-SCNC: 100 MMOL/L — SIGNIFICANT CHANGE UP (ref 96–108)
CO2 SERPL-SCNC: 24 MMOL/L — SIGNIFICANT CHANGE UP (ref 22–31)
CREAT SERPL-MCNC: 0.56 MG/DL — SIGNIFICANT CHANGE UP (ref 0.5–1.3)
EGFR: 101 ML/MIN/1.73M2 — SIGNIFICANT CHANGE UP
EOSINOPHIL # BLD AUTO: 0.02 K/UL — SIGNIFICANT CHANGE UP (ref 0–0.5)
EOSINOPHIL NFR BLD AUTO: 0.3 % — SIGNIFICANT CHANGE UP (ref 0–6)
FLUAV AG NPH QL: SIGNIFICANT CHANGE UP
FLUBV AG NPH QL: SIGNIFICANT CHANGE UP
GLUCOSE SERPL-MCNC: 90 MG/DL — SIGNIFICANT CHANGE UP (ref 70–99)
HCT VFR BLD CALC: 38.5 % — SIGNIFICANT CHANGE UP (ref 34.5–45)
HGB BLD-MCNC: 13.2 G/DL — SIGNIFICANT CHANGE UP (ref 11.5–15.5)
IMM GRANULOCYTES NFR BLD AUTO: 0.3 % — SIGNIFICANT CHANGE UP (ref 0–0.9)
INR BLD: 1 RATIO — SIGNIFICANT CHANGE UP (ref 0.88–1.16)
LYMPHOCYTES # BLD AUTO: 1.88 K/UL — SIGNIFICANT CHANGE UP (ref 1–3.3)
LYMPHOCYTES # BLD AUTO: 28.5 % — SIGNIFICANT CHANGE UP (ref 13–44)
MCHC RBC-ENTMCNC: 31.5 PG — SIGNIFICANT CHANGE UP (ref 27–34)
MCHC RBC-ENTMCNC: 34.3 GM/DL — SIGNIFICANT CHANGE UP (ref 32–36)
MCV RBC AUTO: 91.9 FL — SIGNIFICANT CHANGE UP (ref 80–100)
MONOCYTES # BLD AUTO: 0.34 K/UL — SIGNIFICANT CHANGE UP (ref 0–0.9)
MONOCYTES NFR BLD AUTO: 5.2 % — SIGNIFICANT CHANGE UP (ref 2–14)
NEUTROPHILS # BLD AUTO: 4.3 K/UL — SIGNIFICANT CHANGE UP (ref 1.8–7.4)
NEUTROPHILS NFR BLD AUTO: 65.2 % — SIGNIFICANT CHANGE UP (ref 43–77)
NRBC # BLD: 0 /100 WBCS — SIGNIFICANT CHANGE UP (ref 0–0)
PLATELET # BLD AUTO: 220 K/UL — SIGNIFICANT CHANGE UP (ref 150–400)
POTASSIUM SERPL-MCNC: 3.9 MMOL/L — SIGNIFICANT CHANGE UP (ref 3.5–5.3)
POTASSIUM SERPL-SCNC: 3.9 MMOL/L — SIGNIFICANT CHANGE UP (ref 3.5–5.3)
PROT SERPL-MCNC: 7.8 G/DL — SIGNIFICANT CHANGE UP (ref 6–8.3)
PROTHROM AB SERPL-ACNC: 11.6 SEC — SIGNIFICANT CHANGE UP (ref 10.5–13.4)
RBC # BLD: 4.19 M/UL — SIGNIFICANT CHANGE UP (ref 3.8–5.2)
RBC # FLD: 11.7 % — SIGNIFICANT CHANGE UP (ref 10.3–14.5)
RH IG SCN BLD-IMP: POSITIVE — SIGNIFICANT CHANGE UP
RSV RNA NPH QL NAA+NON-PROBE: SIGNIFICANT CHANGE UP
SARS-COV-2 RNA SPEC QL NAA+PROBE: SIGNIFICANT CHANGE UP
SODIUM SERPL-SCNC: 138 MMOL/L — SIGNIFICANT CHANGE UP (ref 135–145)
WBC # BLD: 6.59 K/UL — SIGNIFICANT CHANGE UP (ref 3.8–10.5)
WBC # FLD AUTO: 6.59 K/UL — SIGNIFICANT CHANGE UP (ref 3.8–10.5)

## 2023-01-22 PROCEDURE — 36415 COLL VENOUS BLD VENIPUNCTURE: CPT

## 2023-01-22 PROCEDURE — 85610 PROTHROMBIN TIME: CPT

## 2023-01-22 PROCEDURE — 99285 EMERGENCY DEPT VISIT HI MDM: CPT

## 2023-01-22 PROCEDURE — 70496 CT ANGIOGRAPHY HEAD: CPT | Mod: MA

## 2023-01-22 PROCEDURE — 70553 MRI BRAIN STEM W/O & W/DYE: CPT | Mod: 26

## 2023-01-22 PROCEDURE — 87637 SARSCOV2&INF A&B&RSV AMP PRB: CPT

## 2023-01-22 PROCEDURE — 70553 MRI BRAIN STEM W/O & W/DYE: CPT

## 2023-01-22 PROCEDURE — 86901 BLOOD TYPING SEROLOGIC RH(D): CPT

## 2023-01-22 PROCEDURE — 85025 COMPLETE CBC W/AUTO DIFF WBC: CPT

## 2023-01-22 PROCEDURE — 86900 BLOOD TYPING SEROLOGIC ABO: CPT

## 2023-01-22 PROCEDURE — 86850 RBC ANTIBODY SCREEN: CPT

## 2023-01-22 PROCEDURE — 85730 THROMBOPLASTIN TIME PARTIAL: CPT

## 2023-01-22 PROCEDURE — A9585: CPT

## 2023-01-22 PROCEDURE — 80053 COMPREHEN METABOLIC PANEL: CPT

## 2023-01-22 PROCEDURE — 70498 CT ANGIOGRAPHY NECK: CPT | Mod: 26,MA

## 2023-01-22 PROCEDURE — 70450 CT HEAD/BRAIN W/O DYE: CPT | Mod: 26,MA,59

## 2023-01-22 PROCEDURE — 70450 CT HEAD/BRAIN W/O DYE: CPT | Mod: MA

## 2023-01-22 PROCEDURE — 70498 CT ANGIOGRAPHY NECK: CPT | Mod: MA

## 2023-01-22 PROCEDURE — 70496 CT ANGIOGRAPHY HEAD: CPT | Mod: 26,MA

## 2023-01-22 PROCEDURE — 99284 EMERGENCY DEPT VISIT MOD MDM: CPT | Mod: 25

## 2023-01-22 NOTE — ED PROVIDER NOTE - PATIENT PORTAL LINK FT
You can access the FollowMyHealth Patient Portal offered by [facility] by registering at the following website: [email or web address]. By joining [employer]’s FollowMyHealth portal, you will also be able to view your health information using other applications (apps) compatible with our system.

## 2023-01-22 NOTE — ED PROVIDER NOTE - OBJECTIVE STATEMENT
Hanna Mcdonnell MD  68yo Female PMH HTN, dyslipidemia, new onset dementia, Etoh abuse (1/2 - 1 bottle of wine daily) was admitted at  [facility] 11/18/22 s/p an unwitnessed fall with trauma to the front of the head.  Patient developed increasing confusion the following day.  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 EVD placed 11/18 (removed 11/23). today had an MRI follow up and there was a concern for a bleed. no headache, no new symptoms. Hanna Mcdonnell MD  66yo Female PMH HTN, dyslipidemia, new onset dementia, Etoh abuse (1/2 - 1 bottle of wine daily) was admitted at  [facility] 11/18/22 s/p an unwitnessed fall with trauma to the front of the head.  Patient developed increasing confusion the following day.  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 EVD placed 11/18 (removed 11/23). today had an MRI follow up and there was a concern for a bleed. no headache, no new symptoms.      67-year-old recently admitted for ICH underwent neurosurgery presenting to the emergency department following an abnormal MRI.  Patient went to follow-up with her neurologist and had an outpatient MRI showing a enhancing lesion in the left pontine location with surrounding edema which may represent subacute infarct AVM or neoplastic process consider correlation with CT if to assess for hemorrhage.  Patient states that she has no new symptoms denies headache visual changes trouble walking or talking weakness numbness or tingling in any extremity.

## 2023-01-22 NOTE — ED ADULT NURSE NOTE - NS ED NURSE IV DC DT
[Supportive] : Goals of care discussed with patient: Supportive [Palliative Care Plan] : patient was apprised of terminal prognosis of 6 months or less. Referral made to Hospice or Palliative Care Service [FreeTextEntry1] : Mr. BIGG GARZA is a pleasant 56 year old man who comes here today to be evaluated for his diagnosis of refractory locally advanced nasopharyngeal cancer. Unfortunately, tumor is refractory/resistant to chemo and radiation. He should continue with hospice care. \par \par Pain controlled on current regimen of methadone. \par \par I will keep available for him and his family as the need arises. \par \par \par Marcia Leon MD\par , [facility]\par [facility]\par [address]\[place], [place], [address]\par Tel: [phone]\par Fax: [phone]\par \par  22-Jan-2023 23:34

## 2023-01-22 NOTE — ED CLERICAL - NS ED CLERK NOTE PRE-ARRIVAL INFORMATION; ADDITIONAL PRE-ARRIVAL INFORMATION
CC/Reason For referral: Pt. presented in Nov. with ICH, was having follow-up MRI that showed new lesions. Dr. Behr-Ventura recommending non-contrast CT head. Dr. Dr. Behr-Ventura can be reached before 8am and has documented report from today.  Preferred Consultant(if applicable):  Who admits for you (if needed):  Do you have documents you would like to fax over?  Would you still like to speak to an ED attending? Yes

## 2023-01-22 NOTE — ED PROVIDER NOTE - PROGRESS NOTE DETAILS
Joe, PGY3: Patient received as sign out, initially presented with abnormal MRI. Evaluated by neuro, recommending CTA for further evaluation and dispo pending results. Likely discharge with outpatient follow up. Joe, PGY3: CTA without acute abnormality. Spoke with neuro, stable for DC. Discussed with patient, okay going home. Return precautions discussed.

## 2023-01-22 NOTE — ED ADULT NURSE REASSESSMENT NOTE - NS ED NURSE REASSESS COMMENT FT1
Patient resting comfortably, breathing unlabored, VSS, no signs of acute distress, no requests at this time, plan of care explained, side rails raised, bed in lowest position, comfort and safety maintained. Patient resting comfortably, breathing unlabored, VSS, neuro intact, no signs of acute distress, no requests at this time, plan of care explained, side rails raised, bed in lowest position, comfort and safety maintained.

## 2023-01-22 NOTE — CONSULT NOTE ADULT - ASSESSMENT
Patient is a 66yo Rt handed F with Pmh of HTN, Recent Cerebellar ICH with IVH ( 11/22), hx of CVA, Dementia, ETOH abuse presents to ED for further evaluation for MRI head results.  CT head noted to confirm Left ryland lesion and decrease in blood products. Patient NIHSS 0.     Impression   Abnormal MRI results noted to show subacute CVA in the setting of recent ICH with IVH in November.   Recommendations:   CTA head and neck w/contrast   Continue current medical management   Correction of electrolytes   No new medications at this time   Will need to follow up with Dr. Franco at known appt on 1/27.   Can be discharged home, Once CTA H/N is completed and with no abnormal findings.     Case discussed with Dr. Remington Gomez and Dr. Franco.

## 2023-01-22 NOTE — CONSULT NOTE ADULT - NSCONSULTADDITIONALINFOA_GEN_ALL_CORE
Vascular Neurology Fellow Attestation:  Patient seen and examined with stroke team and agree with above.  66yo woman with recent Cerebellar ICH with IVH in November 2022, hx of CVA, Dementia, ETOH abuse referred to ED for after new subacute infarcts noted on outpatient MRI brain.  CT head noted to confirm Left ryland lesion and decrease in blood products. No acute ICH. Patient NIHSS 0. Stable for d/c home after CTA H/N with no abnormal findings. Continue current medical management and follow up with Dr. Franco as scheduled on 1/27.    Case discussed with stroke attending, Dr. Franco.

## 2023-01-22 NOTE — ED PROVIDER NOTE - CARE PROVIDER_API CALL
David Franco (DO)  Neurology; Vascular Neurology  [address], [address]  [place] [address]  Phone: [phone]  Fax: [phone]  Scheduled Appointment: 01/27/2023

## 2023-01-22 NOTE — ED PROVIDER NOTE - CLINICAL SUMMARY MEDICAL DECISION MAKING FREE TEXT BOX
Hanna Mcdonnell MD  68yo Female PMH HTN, dyslipidemia, new onset dementia, Etoh abuse (1/2 - 1 bottle of wine daily) was admitted at  [facility] 11/18/22 s/p an unwitnessed fall with trauma to the front of the head.  Patient developed increasing confusion the following day.  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 EVD placed 11/18 (removed 11/23). today had an MRI follow up and there was a concern for a bleed. no headache, no new symptoms. on exam normal neuro exam, MRI with concern for bleed, will do labs, Head CT  and reassess.

## 2023-01-22 NOTE — ED ADULT NURSE NOTE - OBJECTIVE STATEMENT
Patient is a 66 y/o female with PMH of HTN, dyslipidemia, new onset dementia, ETOH abuse (half to a full bottle of wine daily) presenting to the ED with c/o abnormal MRI result. Pt was recently admitted at [facility] in November 2022 s/p unwitnessed fall with head trauma leading to brain bleed (CT showed IPH with IVH in 4th/3rd ventricle- ventriculomegaly). EVD was placed on 11/18 and removed on 11/23. Pt has been having follow up MRIs done and today's MRI showed concern for bleed, pt sent to ED for CT. Pt asymptomatic, denies headache/dizziness/vision changes. Pt A&Ox4, speaking coherently, well-appearing, calm/cooperative. Neuro intact, no neuro deficits noted. Patient breathing unlabored/spontaneously, denies SOB, chest pain, n/v/d, fever, cough, chills. Peripheral pulses intact, skin normal color/warm/intact. Plan of care explained to patient, side rails raised, bed in lowest position, comfort and safety measures maintained.

## 2023-01-22 NOTE — ED PROVIDER NOTE - ATTENDING APP SHARED VISIT CONTRIBUTION OF CARE
I performed a history and physical exam of the patient and discussed their management with the ACP. I reviewed the ACP's note and agree with the documented findings and plan of care.  Hanna Mcdonnell MD

## 2023-01-22 NOTE — CONSULT NOTE ADULT - SUBJECTIVE AND OBJECTIVE BOX
MRN-[id number]  Patient is a 67y old  Female who presents with a chief complaint of   HPI:  Patient is a 68yo Rt handed F with Pmh of HTN, Recent Cerebellar ICH with IVH ( 11/22), hx of CVA, Dementia, ETOH abuse presents to ED for further evaluation for MRI head results. MRI was noted to show new subacute lesions in the left ryland and Rt corona radiata. There was decrease in overall blood products. These images were discussed with Dr. Dunn whom advised patient to come to the EDfor further evaluation. Patient had Ct head which was noted to show left ryland lesion. Patient NIHSS 0. Patient has an appointment with her Neurologist, Dr. Andrew Franco on Friday 1/27.    PAST MEDICAL & SURGICAL HISTORY:    FAMILY HISTORY:    Social Hx:  Nonsmoker, no drug or alcohol use    Home Medications:    MEDICATIONS  (STANDING):    MEDICATIONS  (PRN):    Allergies  No Known Allergies    Intolerances      REVIEW OF SYSTEMS  General: Denies fever and chills	  Ophthalmologic: Denies blurred vision   Respiratory and Thorax:	Denies sob   Cardiovascular:	Denies CP   Gastrointestinal:	Denies N,  V  Musculoskeletal:	 denies muscle pain   Neurological:	Denies numbness and tingling   ROS: Pertinent positives in HPI, all other ROS were reviewed and are negative.      Vital Signs Last 24 Hrs  T(C): 36.9 (22 Jan 2023 19:12), Max: 36.9 (22 Jan 2023 19:12)  T(F): 98.5 (22 Jan 2023 19:12), Max: 98.5 (22 Jan 2023 19:12)  HR: 66 (22 Jan 2023 19:12) (66 - 66)  BP: 157/53 (22 Jan 2023 19:12) (157/53 - 157/53)  BP(mean): --  RR: 18 (22 Jan 2023 19:12) (18 - 18)  SpO2: 96% (22 Jan 2023 19:12) (96% - 96%)        GENERAL EXAM:  Constitutional: awake and alert. NAD  HEENT: PERRL, EOMI  Musculoskeletal: no joint swelling/tenderness, no abnormal movements  Skin: no rashes    NEUROLOGICAL EXAM:  MS: AAOX3 to verbal stimulation. Follows simple and complex commands. Speech is fluent, there is no aphasia noted.   CN: VFF, EOMI, PERRL,  V1-3 intact, no facial asymmetry, t/p midline, SCM/trap intact.  Motor: Strength: 5/5 4x.  Tone: normal. Bulk: normal.   Plantar flex b/l.   Sensation: intact 4x.   Coordination: intact 4x.   Gait:  Romberg negative, pull test negative; walks with narrow base, pivots in 2 steps.    NIHSS 0   mRS 0     Labs:   cbc                      13.2   6.59  )-----------( 220      ( 22 Jan 2023 20:04 )             38.5     [id number]    138  |  100  |  12  ----------------------------<  90  3.9   |  24  |  0.56    Ca    9.8      22 Jan 2023 20:04    TPro  7.8  /  Alb  4.4  /  TBili  0.3  /  DBili  x   /  AST  21  /  ALT  19  /  AlkPhos  85  01-22    CoagsPT/INR - ( 22 Jan 2023 20:04 )   PT: 11.6 sec;   INR: 1.00 ratio         PTT - ( 22 Jan 2023 20:04 )  PTT:27.5 sec  LFTsLIVER FUNCTIONS - ( 22 Jan 2023 20:04 )  Alb: 4.4 g/dL / Pro: 7.8 g/dL / ALK PHOS: 85 U/L / ALT: 19 U/L / AST: 21 U/L / GGT: x             Radiology:    IMPRESSION:  1. No acute intracranial hemorrhage.  2. Small focus of low attenuation in the left ryland, better visualized on   the MRI of the brain from earlier the same day.      MR head   1. Interval decrease in previously appreciated hemorrhagic process at the   junction of the left cerebellum and the cerebellar vermis. Signal   characteristics on T2-weighted imaging suggests this finding represents a   cavernous angioma.  2. Interval decrease in previously appreciated intraventricular   hemorrhage.  3. New peripherally enhancing lesion appreciated in a left pontine   location with associated restricted diffusion on DWI and ADC mapping and   surrounding edema, which may represent a subacute infarct, vascular   malformation, neoplastic process, or infectious/inflammatory process.   Consider correlation with noncontrast CT imaging to assess for presence   of hemorrhage in this location, which is not clearly appreciated on the   current MRI study.  4. New punctate focus of increased signal is appreciated on DWI imaging   without definite restricted diffusion on ADC mapping on the right at the   level the corona radiata, which may represent a subacute lacunar infarct   in this location. Associated enhancement is not appreciated in this   location.  5. Magnetic susceptibility artifact appreciated within the region of the   right cerebellum on SWI imaging, which was present on gradient echo   imaging from prior study dated 11/20/2022 and may represent an occult   vascular malformation/small cavernous angioma.

## 2023-01-22 NOTE — ED PROVIDER NOTE - NSFOLLOWUPINSTRUCTIONS_ED_ALL_ED_FT
1. You presented to the emergency department for: ABNORMAL MRI    2. Your evaluation in the emergency department included a physician evaluation. Your work-up did not reveal any findings indicating the need for admission to the hospital or any emergent interventions at this time.     3. It is recommended that you follow-up with Neurology Dr. Franco at your scheduled appointment.     If needed, to arrange an appointment with a primary care provider please call: [phone]    4. Please continue taking your regular medications as prescribed.     5. PLEASE RETURN TO THE EMERGENCY DEPARTMENT IMMEDIATELY IF you develop any fevers not responding to over the counter medications, uncontrollable nausea and vomiting, an inability to tolerate eating and drinking, difficulty breathing, chest pain, a severe increase in your symptoms or pain, or any other new symptoms that concern you.

## 2023-01-22 NOTE — ED ADULT NURSE NOTE - IS THE PATIENT ABLE TO BE SCREENED?
Pre-procedure checklist reviewed, AUC complete and pre-sedation note complete.    MD aware of maximum contrast dose of 333 mL.  Yes

## 2023-02-09 ENCOUNTER — APPOINTMENT (OUTPATIENT)
Dept: PHYSICAL MEDICINE AND REHAB | Facility: CLINIC | Age: 68
End: 2023-02-09
Payer: MEDICARE

## 2023-02-09 ENCOUNTER — NON-APPOINTMENT (OUTPATIENT)
Age: 68
End: 2023-02-09

## 2023-02-09 VITALS
OXYGEN SATURATION: 95 % | SYSTOLIC BLOOD PRESSURE: 147 MMHG | HEIGHT: 62 IN | BODY MASS INDEX: 21.62 KG/M2 | TEMPERATURE: 98.2 F | HEART RATE: 81 BPM | DIASTOLIC BLOOD PRESSURE: 85 MMHG | WEIGHT: 117.5 LBS

## 2023-02-09 DIAGNOSIS — I63.9 CEREBRAL INFARCTION, UNSPECIFIED: ICD-10-CM

## 2023-02-09 DIAGNOSIS — R41.89 OTHER SYMPTOMS AND SIGNS INVOLVING COGNITIVE FUNCTIONS AND AWARENESS: ICD-10-CM

## 2023-02-09 DIAGNOSIS — Z74.09 OTHER REDUCED MOBILITY: ICD-10-CM

## 2023-02-09 PROCEDURE — 99214 OFFICE O/P EST MOD 30 MIN: CPT

## 2023-02-09 NOTE — PHYSICAL EXAM
[FreeTextEntry1] : PE: Patient seen with daughter assisting in history\par pleasant NAD, follows simple commands +reduced sustained attention, benefits from repetition and cues to follow\par \par EOMI +2 beat nystagmus with horizontal gaze but extinguished with repetition. no dizziness\par EOMI\par PERRL\par \par ext: BUE and LE normal tone and ROM\par motor 5/5 bilateral shoulder, elblow flexion and extension, gross grasp\par HF 4+/5 quad 5/5 ham 5-/5 ankle PF and %/5 \par no calf swelling +soft no TTP\par \par neuro: \par localizes, no sensory deficits LT\par no facial droop or dysarthria\par word finding intact conversational speech\par poor recall, reduced insight, fair safety awareness\par reduced carryover\par \par finger to nose intact bilaterlaly no dysmetria\par negative Romberg\par SLS: unable on right\par SLS 5 seconds on left leg

## 2023-02-09 NOTE — ED ADULT NURSE NOTE - SUICIDE SCREENING QUESTION 2
No Double O-Z Flap Text: The defect edges were debeveled with a #15 scalpel blade. Given the location of the defect, shape of the defect and the proximity to free margins a Double O-Z flap was deemed most appropriate. Using a sterile surgical marker, an appropriate transposition flap was drawn incorporating the defect and placing the expected incisions within the relaxed skin tension lines where possible. The area thus outlined was incised deep to adipose tissue with a #15 scalpel blade. The skin margins were undermined to an appropriate distance in all directions utilizing iris scissors. Following this, the designed flap was placed into the primary defect and sutured into place.

## 2023-02-09 NOTE — ASSESSMENT
[FreeTextEntry1] : 68yo Female PMH HTN, dyslipidemia, new onset dementia, Etoh 1/2 - 1 bottle of wine daily, who presents from [facility] 11/18/22 s/p an unwitnessed fall with trauma to the front of the head. 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 Recent MRI also shows subacute pontine CVA. Has residual balance, and cognitive deficits\par \par 1. PT 2 x week x 8 weeks. Dynamic balance, motor strengthening, postural exercises, motor coordinaiton, stafey, progressive ambulation, stairs HEP\par \par 2. SLP 2  xweek x 12 weeks. cognitive linguistic eval and training\par \par 3. f/u neurology/neurovascular team\par \par 4. PCP f/u , BP has been stable\par \par 5. f/u 2 months. Reviewed MRI, plan with patient and family who are agreeable. Compliance with BP management and stroke reduction also reviewed

## 2023-02-09 NOTE — HISTORY OF PRESENT ILLNESS
[FreeTextEntry1] : 68yo Female PMH HTN, dyslipidemia, new onset dementia, Etoh 1/2 - 1 bottle of wine daily, who presents from [facility] 11/18/22 s/p an unwitnessed fall with trauma to the front of the head.  Patient developed increasing confusion  CTH +cerebellar IPH with IVH in 4th/3rd ventricle, ventriculomegaly. EVD placed 11/18 (removed 11/23). \par \par EEG monitoring without epileptiform activity recorded x1 day. Patient initially placed on CIWA and did not display signs of withdrawal. CTA without evidence of malformation; MRI+ multiple cavernomas which are likely the etiology of ICH. She was Rx for UTI 11/26, with final dose of Ceftriaxone on 11/28. \par \par Patient was optimized for d/c to acute rehab on 11/29/22. She presented with initial hypertension which trended down normal Bp requiring discontinuation of hydralazine and a decreased dose of lisinopril. Patient remains on amlodipine. \par \par Functional Status on dc::\par SLP: mod lang deficits; severe memory deficits. impulsive, decreased safety \par insight. \par OT: CG LBD and transfers, supervision for other ADLs. \par PT: Supervision, impulsive. \par Will need 24h supervision at home \par \par Patient states she watches television program, tries to watch things she's interested in. Famliy reports that she does get distracted, wanders, hard to remember details. Tries to focus on things she enjoys. She continues to require assistance for medicaitons and appointments, even with reminder\par \par Patient has been seeing neurology for memory loss. MRI ordered --> showed subacute pontine stroke. CTA performed, stable 1/22. Uses rollator outside, cane inside. No histry of falls since dc.\par \par \par \par ACC: [id number] EXAM:  CT BRAIN   ORDERED BY: THOMAS FRYE \par \par PROCEDURE DATE:  01/22/2023  \par \par \par \par INTERPRETATION:  CLINICAL INDICATION: Questionable intracerebral \par hemorrhage on an MRI from earlier the same day.\par \par TECHNIQUE: Axial CT scanning of the brain was obtained from the skull \par base to the vertex without the administration of intravenous contrast. \par Coronal and sagittal reformatted images were subsequently obtained.\par \par COMPARISON: MRI head 1/22/2023, CT head 11/29/2022, CT head 11/18/2022.\par \par FINDINGS:\par Mild parenchymal volume loss with prominent ventricles and sulci. Chronic \par microvascular ischemic changes.  Senescent bilateral basal ganglia \par calcifications. No hydrocephalus.\par \par Small focus of gliosis and encephalomalacia in the left cerebellum likely \par sequela of the prior, now resolved, left cerebellar hematoma. No new, \par acute intracranial hemorrhage. Small focus of low attenuation in the left \par ryland, better visualized on the MRI of the brain from earlier the same \par day. No associated hemorrhage with the left ryland lesion. No mass effect \par or midline shift.\par \par The visualized paranasal sinuses and mastoid air cells are clear.\par \par Right frontal woodrow hole. No calvarial fracture.\par \par IMPRESSION:\par 1. No acute intracranial hemorrhage.\par 2. Small focus of low attenuation in the left ryland, better visualized on \par the MRI of the brain from earlier the same day.\par \par --- End of Report ---\par \par \par \par \par  RU CAMEJO MD; Resident Radiologist\par This document has been electronically signed.\par TORIN NGUYEN MD; Attending Radiologist\par This document has been electronically signed. Jan 22 2023  9:37PM

## 2023-02-14 ENCOUNTER — TRANSCRIPTION ENCOUNTER (OUTPATIENT)
Age: 68
End: 2023-02-14

## 2023-02-14 ENCOUNTER — APPOINTMENT (OUTPATIENT)
Dept: NEUROLOGY | Facility: CLINIC | Age: 68
End: 2023-02-14
Payer: MEDICARE

## 2023-02-14 VITALS
HEIGHT: 62 IN | WEIGHT: 293 LBS | BODY MASS INDEX: 53.92 KG/M2 | SYSTOLIC BLOOD PRESSURE: 158 MMHG | HEART RATE: 84 BPM | DIASTOLIC BLOOD PRESSURE: 84 MMHG

## 2023-02-14 DIAGNOSIS — I10 ESSENTIAL (PRIMARY) HYPERTENSION: ICD-10-CM

## 2023-02-14 DIAGNOSIS — Z78.9 OTHER SPECIFIED HEALTH STATUS: ICD-10-CM

## 2023-02-14 PROCEDURE — 99215 OFFICE O/P EST HI 40 MIN: CPT

## 2023-02-14 RX ORDER — AMLODIPINE BESYLATE 10 MG/1
10 TABLET ORAL
Refills: 0 | Status: ACTIVE | COMMUNITY

## 2023-02-14 RX ORDER — MULTIVIT,IRON,MINS/FOLIC ACID 3-200-400
TABLET ORAL
Refills: 0 | Status: ACTIVE | COMMUNITY

## 2023-02-14 RX ORDER — LISINOPRIL 20 MG/1
20 TABLET ORAL
Refills: 0 | Status: ACTIVE | COMMUNITY

## 2023-02-14 RX ORDER — FOLIC ACID 1 MG/1
1 TABLET ORAL
Refills: 0 | Status: ACTIVE | COMMUNITY

## 2023-02-14 NOTE — PHYSICAL EXAM

## 2023-02-14 NOTE — CONSULT LETTER
[Dear  ___] : Dear  [unfilled], [Consult Letter:] : I had the pleasure of evaluating your patient, [unfilled]. [( Thank you for referring [unfilled] for consultation for _____ )] : Thank you for referring [unfilled] for consultation for [unfilled] [Consult Closing:] : Thank you very much for allowing me to participate in the care of this patient.  If you have any questions, please do not hesitate to contact me. [Please see my note below.] : Please see my note below. [Sincerely,] : Sincerely, [FreeTextEntry3] : Zeferino Young MD\par Chief, Vascular Neurology and Neurology Service , NeuroEndovascular Surgery\par  of Neurology and Radiology\par [facility] [employer] [facility]\par Director, Comprehensive Stroke Center and Stroke Unit\par [facility]\par Director, NeuroEndovascular Surgery\par [facility]\par

## 2023-02-14 NOTE — HISTORY OF PRESENT ILLNESS
[FreeTextEntry1] : Ms. Yadav is a 68 year old woman with history of hypertension, dyslipidemia, new onset dementia, alcohol abuse (1/2-1 bottle of wine a day) presents after an unwitnessed fall with trauma to the front of the head on 11/17. The next day the patient was found to be confused and N/V. 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 Patient s/p EVD placement 11/18 (removed 11/23). CTA without evidence of vascular malformation. MRI HEAD 11/20/22: Left cerebellar hemorrhage with intraventricular extension and mild hydrocephalus. EEG 11/22: Moderate diffuse/multi-focal cerebral dysfunction, not specific as to etiology. There were no epileptiform abnormalities recorded x 1 day. She was discharged to [facility] on 11/29/22 and now living at home. She comes in today, referred by Dr. Franco to discuss possible cerebral angiogram. She is back to baseline and denies any interval stroke/TIA events.

## 2023-02-14 NOTE — DISCUSSION/SUMMARY
[FreeTextEntry1] : Ms. Yadav is a 68 year old woman with history of hypertension, dyslipidemia, new onset dementia, alcohol abuse now 3 months s/p Left cerebellar hemorrhage, intraventricular hemorrhage, hydrocephalus S/P EVD Etiology includes uncontrolled hypertension versus vascular lesion. 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 Repeat MRI HEAD 01/22/23 showed a decrease in cerebellar hemorrhage. Plan for cerebral angiogram to rule out any underlying malformation. The procedure, risks, benefits, and alternatives were discussed with the patient and family and they are in agreement with the plan. All of their questions and concerns were addressed.

## 2023-02-14 NOTE — REVIEW OF SYSTEMS
[Fever] : no fever [Chills] : no chills [Feeling Poorly] : not feeling poorly [Feeling Tired] : not feeling tired [Confused or Disoriented] : no confusion [Memory Lapses or Loss] : no memory loss [Difficulty with Language] : no ~M difficulty with language [Changed Thought Patterns] : no change in thought patterns [Repeating Questions] : no repeated questioning about recent events [Facial Weakness] : no facial weakness [Arm Weakness] : no arm weakness [Hand Weakness] : no hand weakness [Poor Coordination] : good coordination [Difficulty Writing] : no difficulty writing [Difficulties in Speech] : no speech difficulties [Numbness] : no numbness [Tingling] : no tingling [Seizures] : no convulsions [Dizziness] : no dizziness [Lightheadedness] : no lightheadedness [Vertigo] : no vertigo [Tension Headache] : no tension-type headache [Difficulty Walking] : no difficulty walking [Anxiety] : no anxiety [Depression] : no depression [Eyesight Problems] : no eyesight problems [Loss Of Hearing] : no hearing loss [Chest Pain] : no chest pain [Shortness Of Breath] : no shortness of breath [Wheezing] : no wheezing [Vomiting] : no vomiting [Incontinence] : no incontinence [Joint Pain] : no joint pain [Easy Bleeding] : no tendency for easy bleeding [Easy Bruising] : no tendency for easy bruising

## 2023-03-26 ENCOUNTER — OUTPATIENT (OUTPATIENT)
Dept: OUTPATIENT SERVICES | Facility: HOSPITAL | Age: 68
LOS: 1 days | End: 2023-03-26
Payer: COMMERCIAL

## 2023-03-26 ENCOUNTER — APPOINTMENT (OUTPATIENT)
Dept: MRI IMAGING | Facility: IMAGING CENTER | Age: 68
End: 2023-03-26
Payer: MEDICARE

## 2023-03-26 DIAGNOSIS — Z00.8 ENCOUNTER FOR OTHER GENERAL EXAMINATION: ICD-10-CM

## 2023-03-26 PROCEDURE — 70553 MRI BRAIN STEM W/O & W/DYE: CPT | Mod: 26

## 2023-03-26 PROCEDURE — A9585: CPT

## 2023-03-26 PROCEDURE — 70553 MRI BRAIN STEM W/O & W/DYE: CPT

## 2023-03-27 PROBLEM — E78.5 HYPERLIPIDEMIA, UNSPECIFIED: Chronic | Status: ACTIVE | Noted: 2022-11-18

## 2023-03-27 PROBLEM — I10 ESSENTIAL (PRIMARY) HYPERTENSION: Chronic | Status: ACTIVE | Noted: 2022-11-18

## 2023-03-27 RX ORDER — QUETIAPINE FUMARATE 200 MG/1
0.5 TABLET, FILM COATED ORAL
Qty: 0 | Refills: 0 | DISCHARGE

## 2023-04-07 ENCOUNTER — OUTPATIENT (OUTPATIENT)
Dept: OUTPATIENT SERVICES | Facility: HOSPITAL | Age: 68
LOS: 1 days | End: 2023-04-07
Payer: COMMERCIAL

## 2023-04-07 VITALS
SYSTOLIC BLOOD PRESSURE: 120 MMHG | HEIGHT: 63 IN | OXYGEN SATURATION: 98 % | RESPIRATION RATE: 16 BRPM | WEIGHT: 123.68 LBS | TEMPERATURE: 97 F | DIASTOLIC BLOOD PRESSURE: 78 MMHG | HEART RATE: 79 BPM

## 2023-04-07 DIAGNOSIS — I10 ESSENTIAL (PRIMARY) HYPERTENSION: ICD-10-CM

## 2023-04-07 DIAGNOSIS — Z86.79 PERSONAL HISTORY OF OTHER DISEASES OF THE CIRCULATORY SYSTEM: ICD-10-CM

## 2023-04-07 DIAGNOSIS — Z13.89 ENCOUNTER FOR SCREENING FOR OTHER DISORDER: ICD-10-CM

## 2023-04-07 DIAGNOSIS — Z90.710 ACQUIRED ABSENCE OF BOTH CERVIX AND UTERUS: Chronic | ICD-10-CM

## 2023-04-07 DIAGNOSIS — Z29.9 ENCOUNTER FOR PROPHYLACTIC MEASURES, UNSPECIFIED: ICD-10-CM

## 2023-04-07 DIAGNOSIS — Z01.818 ENCOUNTER FOR OTHER PREPROCEDURAL EXAMINATION: ICD-10-CM

## 2023-04-07 LAB
A1C WITH ESTIMATED AVERAGE GLUCOSE RESULT: 5.5 % — SIGNIFICANT CHANGE UP (ref 4–5.6)
ANION GAP SERPL CALC-SCNC: 11 MMOL/L — SIGNIFICANT CHANGE UP (ref 5–17)
APTT BLD: 29 SEC — SIGNIFICANT CHANGE UP (ref 27.5–35.5)
BASOPHILS # BLD AUTO: 0.03 K/UL — SIGNIFICANT CHANGE UP (ref 0–0.2)
BASOPHILS NFR BLD AUTO: 0.5 % — SIGNIFICANT CHANGE UP (ref 0–2)
BLD GP AB SCN SERPL QL: SIGNIFICANT CHANGE UP
BUN SERPL-MCNC: 12.9 MG/DL — SIGNIFICANT CHANGE UP (ref 8–20)
CALCIUM SERPL-MCNC: 9.6 MG/DL — SIGNIFICANT CHANGE UP (ref 8.4–10.5)
CHLORIDE SERPL-SCNC: 103 MMOL/L — SIGNIFICANT CHANGE UP (ref 96–108)
CO2 SERPL-SCNC: 27 MMOL/L — SIGNIFICANT CHANGE UP (ref 22–29)
CREAT SERPL-MCNC: 0.57 MG/DL — SIGNIFICANT CHANGE UP (ref 0.5–1.3)
EGFR: 100 ML/MIN/1.73M2 — SIGNIFICANT CHANGE UP
EOSINOPHIL # BLD AUTO: 0.03 K/UL — SIGNIFICANT CHANGE UP (ref 0–0.5)
EOSINOPHIL NFR BLD AUTO: 0.5 % — SIGNIFICANT CHANGE UP (ref 0–6)
ESTIMATED AVERAGE GLUCOSE: 111 MG/DL — SIGNIFICANT CHANGE UP (ref 68–114)
GLUCOSE SERPL-MCNC: 96 MG/DL — SIGNIFICANT CHANGE UP (ref 70–99)
HCT VFR BLD CALC: 39.6 % — SIGNIFICANT CHANGE UP (ref 34.5–45)
HGB BLD-MCNC: 13.6 G/DL — SIGNIFICANT CHANGE UP (ref 11.5–15.5)
IMM GRANULOCYTES NFR BLD AUTO: 0.3 % — SIGNIFICANT CHANGE UP (ref 0–0.9)
INR BLD: 0.94 RATIO — SIGNIFICANT CHANGE UP (ref 0.88–1.16)
LYMPHOCYTES # BLD AUTO: 2.31 K/UL — SIGNIFICANT CHANGE UP (ref 1–3.3)
LYMPHOCYTES # BLD AUTO: 38.5 % — SIGNIFICANT CHANGE UP (ref 13–44)
MCHC RBC-ENTMCNC: 31.6 PG — SIGNIFICANT CHANGE UP (ref 27–34)
MCHC RBC-ENTMCNC: 34.3 GM/DL — SIGNIFICANT CHANGE UP (ref 32–36)
MCV RBC AUTO: 91.9 FL — SIGNIFICANT CHANGE UP (ref 80–100)
MONOCYTES # BLD AUTO: 0.43 K/UL — SIGNIFICANT CHANGE UP (ref 0–0.9)
MONOCYTES NFR BLD AUTO: 7.2 % — SIGNIFICANT CHANGE UP (ref 2–14)
NEUTROPHILS # BLD AUTO: 3.18 K/UL — SIGNIFICANT CHANGE UP (ref 1.8–7.4)
NEUTROPHILS NFR BLD AUTO: 53 % — SIGNIFICANT CHANGE UP (ref 43–77)
PLATELET # BLD AUTO: 286 K/UL — SIGNIFICANT CHANGE UP (ref 150–400)
POTASSIUM SERPL-MCNC: 4.4 MMOL/L — SIGNIFICANT CHANGE UP (ref 3.5–5.3)
POTASSIUM SERPL-SCNC: 4.4 MMOL/L — SIGNIFICANT CHANGE UP (ref 3.5–5.3)
PROTHROM AB SERPL-ACNC: 10.9 SEC — SIGNIFICANT CHANGE UP (ref 10.5–13.4)
RBC # BLD: 4.31 M/UL — SIGNIFICANT CHANGE UP (ref 3.8–5.2)
RBC # FLD: 11.7 % — SIGNIFICANT CHANGE UP (ref 10.3–14.5)
SODIUM SERPL-SCNC: 141 MMOL/L — SIGNIFICANT CHANGE UP (ref 135–145)
WBC # BLD: 6 K/UL — SIGNIFICANT CHANGE UP (ref 3.8–10.5)
WBC # FLD AUTO: 6 K/UL — SIGNIFICANT CHANGE UP (ref 3.8–10.5)

## 2023-04-07 PROCEDURE — 93010 ELECTROCARDIOGRAM REPORT: CPT

## 2023-04-07 PROCEDURE — 93005 ELECTROCARDIOGRAM TRACING: CPT

## 2023-04-07 PROCEDURE — G0463: CPT

## 2023-04-07 NOTE — H&P PST ADULT - NSICDXFAMILYHX_GEN_ALL_CORE_FT
FAMILY HISTORY:  Father  Still living? Unknown  FH: HTN (hypertension), Age at diagnosis: Age Unknown  FH: hyperlipidemia, Age at diagnosis: Age Unknown    Mother  Still living? Unknown  FH: HTN (hypertension), Age at diagnosis: Age Unknown  FH: hyperlipidemia, Age at diagnosis: Age Unknown  FH: stroke, Age at diagnosis: Age Unknown

## 2023-04-07 NOTE — H&P PST ADULT - PROBLEM SELECTOR PLAN 3
Cap score 6 Moderate Risk,  SCDs ordered for day of procedure.  Surgical team to assess need for VTE prophylaxis

## 2023-04-07 NOTE — H&P PST ADULT - PROBLEM SELECTOR PLAN 1
cerebral angiogram scheduled for 04/14/2023 with DR. Young.  Patient educated on surgical scrub, COVID testing, preadmission instructions, and day of procedure medications, verbalizes understanding, teach back method utilized.

## 2023-04-07 NOTE — H&P PST ADULT - ASSESSMENT
OPIOID RISK TOOL    ALBERTO EACH BOX THAT APPLIES AND ADD TOTALS AT THE END    FAMILY HISTORY OF SUBSTANCE ABUSE                 FEMALE         MALE                                                Alcohol                             [  ]1 pt          [  ]3pts                                               Illegal Durgs                     [  ]2 pts        [  ]3pts                                               Rx Drugs                           [  ]4 pts        [  ]4 pts    PERSONAL HISTORY OF SUBSTANCE ABUSE                                                                                          Alcohol                             [  ]3 pts       [  ]3 pts                                               Illegal Drugs                     [  ]4 pts        [  ]4 pts                                               Rx Drugs                           [  ]5 pts        [  ]5 pts    AGE BETWEEN 16-45 YEARS                                      [  ]1 pt         [  ]1 pt    HISTORY OF PREADOLESCENT   SEXUAL ABUSE                                                             [  ]3 pts        [  ]0pts    PSYCHOLOGICAL DISEASE                     ADD, OCD, Bipolar, Schizophrenia        [  ]2 pts         [  ]2 pts                      Depression                                               [  ]1 pt           [  ]1 pt           SCORING TOTAL   (add numbers and type here)              (***)                                     A score of 3 or lower indicated LOW risk for future opioid abuse  A score of 4 to 7 indicated moderate risk for future opioid abuse  A score of 8 or higher indicates a high risk for opioid abuse        CAPRINI SCORE    AGE RELATED RISK FACTORS                                                             [ ] Age 41-60 years                                            (1 Point)  [ ] Age: 61-74 years                                           (2 Points)                 [ ] Age= 75 years                                                (3 Points)             DISEASE RELATED RISK FACTORS                                                       [ ] Edema in the lower extremities                 (1 Point)                     [ ] Varicose veins                                               (1 Point)                                 [ ] BMI > 25 Kg/m2                                            (1 Point)                                  [ ] Serious infection (ie PNA)                            (1 Point)                     [ ] Lung disease ( COPD, Emphysema)            (1 Point)                                                                          [ ] Acute myocardial infarction                         (1 Point)                  [ ] Congestive heart failure (in the previous month)  (1 Point)         [ ] Inflammatory bowel disease                            (1 Point)                  [ ] Central venous access, PICC or Port               (2 points)       (within the last month)                                                                [ ] Stroke (in the previous month)                        (5 Points)    [ ] Previous or present malignancy                       (2 points)                                                                                                                                                         HEMATOLOGY RELATED FACTORS                                                         [ ] Prior episodes of VTE                                     (3 Points)                     [ ] Positive family history for VTE                      (3 Points)                  [ ] Prothrombin 82996 A                                     (3 Points)                     [ ] Factor V Leiden                                                (3 Points)                        [ ] Lupus anticoagulants                                      (3 Points)                                                           [ ] Anticardiolipin antibodies                              (3 Points)                                                       [ ] High homocysteine in the blood                   (3 Points)                                             [ ] Other congenital or acquired thrombophilia      (3 Points)                                                [ ] Heparin induced thrombocytopenia                  (3 Points)                                        MOBILITY RELATED FACTORS  [ ] Bed rest                                                         (1 Point)  [ ] Plaster cast                                                    (2 points)  [ ] Bed bound for more than 72 hours           (2 Points)    GENDER SPECIFIC FACTORS  [ ] Pregnancy or had a baby within the last month   (1 Point)  [ ] Post-partum < 6 weeks                                   (1 Point)  [ ] Hormonal therapy  or oral contraception   (1 Point)  [ ] History of pregnancy complications              (1 point)  [ ] Unexplained or recurrent              (1 Point)    OTHER RISK FACTORS                                           (1 Point)  [ ] BMI >40, smoking, diabetes requiring insulin, chemotherapy  blood transfusions and length of surgery over 2 hours    SURGERY RELATED RISK FACTORS  [ ]  Section within the last month     (1 Point)  [ ] Minor surgery                                                  (1 Point)  [ ] Arthroscopic surgery                                       (2 Points)  [ ] Planned major surgery lasting more            (2 Points)      than 45 minutes     [ ] Elective hip or knee joint replacement       (5 points)       surgery                                                TRAUMA RELATED RISK FACTORS  [ ] Fracture of the hip, pelvis, or leg                       (5 Points)  [ ] Spinal cord injury resulting in paralysis             (5 points)       (in the previous month)    [ ] Paralysis  (less than 1 month)                             (5 Points)  [ ] Multiple Trauma within 1 month                        (5 Points)    Total Score [        ]    Caprini Score 0-2: Low Risk, NO VTE prophylaxis required for most patients, encourage ambulation  Caprini Score 3-6: Moderate Risk , pharmacologic VTE prophylaxis is indicated for most patients (in the absence of contraindications)  Caprini Score Greater than or =7: High risk, pharmocologic VTE prophylaxis indicated for most patients (in the absence of contraindications)                                   OPIOID RISK TOOL    ALBERTO EACH BOX THAT APPLIES AND ADD TOTALS AT THE END    FAMILY HISTORY OF SUBSTANCE ABUSE                 FEMALE         MALE                                                Alcohol                             [  ]1 pt          [ x ]3pts                                               Illegal Durgs                     [  ]2 pts        [  ]3pts                                               Rx Drugs                           [  ]4 pts        [  ]4 pts    PERSONAL HISTORY OF SUBSTANCE ABUSE                                                                                          Alcohol                             [ x ]3 pts       [  ]3 pts                                               Illegal Drugs                     [  ]4 pts        [  ]4 pts                                               Rx Drugs                           [  ]5 pts        [  ]5 pts    AGE BETWEEN 16-45 YEARS                                      [  ]1 pt         [  ]1 pt    HISTORY OF PREADOLESCENT   SEXUAL ABUSE                                                             [  ]3 pts        [  ]0pts    PSYCHOLOGICAL DISEASE                     ADD, OCD, Bipolar, Schizophrenia        [  ]2 pts         [  ]2 pts                      Depression                                               [  ]1 pt           [  ]1 pt           SCORING TOTAL   (add numbers and type here)              (***)                                     A score of 3 or lower indicated LOW risk for future opioid abuse  A score of 4 to 7 indicated moderate risk for future opioid abuse  A score of 8 or higher indicates a high risk for opioid abuse        CAPRINI SCORE    AGE RELATED RISK FACTORS                                                             [ ] Age 41-60 years                                            (1 Point)  [x ] Age: 61-74 years                                           (2 Points)                 [ ] Age= 75 years                                                (3 Points)             DISEASE RELATED RISK FACTORS                                                       [ ] Edema in the lower extremities                 (1 Point)                     [ ] Varicose veins                                               (1 Point)                                 [ x] BMI > 25 Kg/m2                                            (1 Point)                                  [ ] Serious infection (ie PNA)                            (1 Point)                     [ ] Lung disease ( COPD, Emphysema)            (1 Point)                                                                          [ ] Acute myocardial infarction                         (1 Point)                  [ ] Congestive heart failure (in the previous month)  (1 Point)         [ ] Inflammatory bowel disease                            (1 Point)                  [ ] Central venous access, PICC or Port               (2 points)       (within the last month)                                                                [ ] Stroke (in the previous month)                        (5 Points)    [ ] Previous or present malignancy                       (2 points)                                                                                                                                                         HEMATOLOGY RELATED FACTORS                                                         [ ] Prior episodes of VTE                                     (3 Points)                     [ ] Positive family history for VTE                      (3 Points)                  [ ] Prothrombin 18227 A                                     (3 Points)                     [ ] Factor V Leiden                                                (3 Points)                        [ ] Lupus anticoagulants                                      (3 Points)                                                           [ ] Anticardiolipin antibodies                              (3 Points)                                                       [ ] High homocysteine in the blood                   (3 Points)                                             [ ] Other congenital or acquired thrombophilia      (3 Points)                                                [ ] Heparin induced thrombocytopenia                  (3 Points)                                        MOBILITY RELATED FACTORS  [ ] Bed rest                                                         (1 Point)  [ ] Plaster cast                                                    (2 points)  [ ] Bed bound for more than 72 hours           (2 Points)    GENDER SPECIFIC FACTORS  [ ] Pregnancy or had a baby within the last month   (1 Point)  [ ] Post-partum < 6 weeks                                   (1 Point)  [ ] Hormonal therapy  or oral contraception   (1 Point)  [ ] History of pregnancy complications              (1 point)  [ ] Unexplained or recurrent              (1 Point)    OTHER RISK FACTORS                                           (1 Point)  [x ] BMI >40, smoking, diabetes requiring insulin, chemotherapy  blood transfusions and length of surgery over 2 hours    SURGERY RELATED RISK FACTORS  [ ]  Section within the last month     (1 Point)  [ ] Minor surgery                                                  (1 Point)  [ ] Arthroscopic surgery                                       (2 Points)  [x ] Planned major surgery lasting more            (2 Points)      than 45 minutes     [ ] Elective hip or knee joint replacement       (5 points)       surgery                                                TRAUMA RELATED RISK FACTORS  [ ] Fracture of the hip, pelvis, or leg                       (5 Points)  [ ] Spinal cord injury resulting in paralysis             (5 points)       (in the previous month)    [ ] Paralysis  (less than 1 month)                             (5 Points)  [ ] Multiple Trauma within 1 month                        (5 Points)    Total Score [        ]    Caprini Score 0-2: Low Risk, NO VTE prophylaxis required for most patients, encourage ambulation  Caprini Score 3-6: Moderate Risk , pharmacologic VTE prophylaxis is indicated for most patients (in the absence of contraindications)  Caprini Score Greater than or =7: High risk, pharmocologic VTE prophylaxis indicated for most patients (in the absence of contraindications)                               67 yo F patient of Dr. Young presents for PST. Pmhx hypertension, dyslipidemia, stroke 2022, new onset dementia, hx alcohol abuse (1/2-1 bottle of wine a day) no longer drinking (last intake 2022), s/p Left cerebellar hemorrhage, intraventricular hemorrhage, hydrocephalus S/P EVD Etiology includes uncontrolled hypertension versus vascular lesion. As per DR. 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    OPIOID RISK TOOL    ALBERTO EACH BOX THAT APPLIES AND ADD TOTALS AT THE END    FAMILY HISTORY OF SUBSTANCE ABUSE                 FEMALE         MALE                                                Alcohol                             [  ]1 pt          [ x ]3pts                                               Illegal Durgs                     [  ]2 pts        [  ]3pts                                               Rx Drugs                           [  ]4 pts        [  ]4 pts    PERSONAL HISTORY OF SUBSTANCE ABUSE                                                                                          Alcohol                             [ x ]3 pts       [  ]3 pts                                               Illegal Drugs                     [  ]4 pts        [  ]4 pts                                               Rx Drugs                           [  ]5 pts        [  ]5 pts    AGE BETWEEN 16-45 YEARS                                      [  ]1 pt         [  ]1 pt    HISTORY OF PREADOLESCENT   SEXUAL ABUSE                                                             [  ]3 pts        [  ]0pts    PSYCHOLOGICAL DISEASE                     ADD, OCD, Bipolar, Schizophrenia        [  ]2 pts         [  ]2 pts                      Depression                                               [  ]1 pt           [  ]1 pt           SCORING TOTAL   (add numbers and type here)              (6)                                     A score of 3 or lower indicated LOW risk for future opioid abuse  A score of 4 to 7 indicated moderate risk for future opioid abuse  A score of 8 or higher indicates a high risk for opioid abuse        CAPRINI SCORE    AGE RELATED RISK FACTORS                                                             [ ] Age 41-60 years                                            (1 Point)  [x ] Age: 61-74 years                                           (2 Points)                 [ ] Age= 75 years                                                (3 Points)             DISEASE RELATED RISK FACTORS                                                       [ ] Edema in the lower extremities                 (1 Point)                     [ ] Varicose veins                                               (1 Point)                                 [ x] BMI > 25 Kg/m2                                            (1 Point)                                  [ ] Serious infection (ie PNA)                            (1 Point)                     [ ] Lung disease ( COPD, Emphysema)            (1 Point)                                                                          [ ] Acute myocardial infarction                         (1 Point)                  [ ] Congestive heart failure (in the previous month)  (1 Point)         [ ] Inflammatory bowel disease                            (1 Point)                  [ ] Central venous access, PICC or Port               (2 points)       (within the last month)                                                                [ ] Stroke (in the previous month)                        (5 Points)    [ ] Previous or present malignancy                       (2 points)                                                                                                                                                         HEMATOLOGY RELATED FACTORS                                                         [ ] Prior episodes of VTE                                     (3 Points)                     [ ] Positive family history for VTE                      (3 Points)                  [ ] Prothrombin 35265 A                                     (3 Points)                     [ ] Factor V Leiden                                                (3 Points)                        [ ] Lupus anticoagulants                                      (3 Points)                                                           [ ] Anticardiolipin antibodies                              (3 Points)                                                       [ ] High homocysteine in the blood                   (3 Points)                                             [ ] Other congenital or acquired thrombophilia      (3 Points)                                                [ ] Heparin induced thrombocytopenia                  (3 Points)                                        MOBILITY RELATED FACTORS  [ ] Bed rest                                                         (1 Point)  [ ] Plaster cast                                                    (2 points)  [ ] Bed bound for more than 72 hours           (2 Points)    GENDER SPECIFIC FACTORS  [ ] Pregnancy or had a baby within the last month   (1 Point)  [ ] Post-partum < 6 weeks                                   (1 Point)  [ ] Hormonal therapy  or oral contraception   (1 Point)  [ ] History of pregnancy complications              (1 point)  [ ] Unexplained or recurrent              (1 Point)    OTHER RISK FACTORS                                           (1 Point)  [x ] BMI >40, smoking, diabetes requiring insulin, chemotherapy  blood transfusions and length of surgery over 2 hours    SURGERY RELATED RISK FACTORS  [ ]  Section within the last month     (1 Point)  [ ] Minor surgery                                                  (1 Point)  [ ] Arthroscopic surgery                                       (2 Points)  [x ] Planned major surgery lasting more            (2 Points)      than 45 minutes     [ ] Elective hip or knee joint replacement       (5 points)       surgery                                                TRAUMA RELATED RISK FACTORS  [ ] Fracture of the hip, pelvis, or leg                       (5 Points)  [ ] Spinal cord injury resulting in paralysis             (5 points)       (in the previous month)    [ ] Paralysis  (less than 1 month)                             (5 Points)  [ ] Multiple Trauma within 1 month                        (5 Points)    Total Score [    6    ]    Caprini Score 0-2: Low Risk, NO VTE prophylaxis required for most patients, encourage ambulation  Caprini Score 3-6: Moderate Risk , pharmacologic VTE prophylaxis is indicated for most patients (in the absence of contraindications)  Caprini Score Greater than or =7: High risk, pharmocologic VTE prophylaxis indicated for most patients (in the absence of contraindications)

## 2023-04-07 NOTE — H&P PST ADULT - NSANTHOSAYNRD_GEN_A_CORE
No. NISSA screening performed.  STOP BANG Legend: 0-2 = LOW Risk; 3-4 = INTERMEDIATE Risk; 5-8 = HIGH Risk

## 2023-04-07 NOTE — H&P PST ADULT - NEUROLOGICAL
details… hx memory and attention deficit/normal/sensation intact/responds to verbal commands/cranial nerves intact/no spontaneous movement

## 2023-04-07 NOTE — H&P PST ADULT - HISTORY OF PRESENT ILLNESS
Ms. Yadav is a 68 year old woman with history of hypertension, dyslipidemia, new onset dementia, alcohol abuse (1/2-1 bottle of wine a day) presents after an unwitnessed fall with trauma to the front of the head on 11/17. The next day the patient was found to be confused and N/V. 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 Patient s/p EVD placement 11/18 (removed 11/23). CTA without evidence of vascular malformation. MRI HEAD 11/20/22: Left cerebellar hemorrhage with intraventricular extension and mild hydrocephalus. EEG 11/22: Moderate diffuse/multi-focal cerebral dysfunction, not specific as to etiology. There were no epileptiform abnormalities recorded x 1 day. She was discharged to [facility] on 11/29/22 and now living at home. She comes in today, referred by Dr. Franco to discuss possible cerebral angiogram. She is back to baseline and denies any interval stroke/TIA events.      Ms. Yadav is a 68 year old woman with history of hypertension, dyslipidemia, new onset dementia, alcohol abuse now 3 months s/p Left cerebellar hemorrhage, intraventricular hemorrhage, hydrocephalus S/P EVD Etiology includes uncontrolled hypertension versus vascular lesion. 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 Repeat MRI HEAD 01/22/23 showed a decrease in cerebellar hemorrhage. Plan for cerebral angiogram to rule out any underlying malformation. The procedure, risks, benefits, and alternatives were discussed with the patient and family and they are in agreement with the plan. All of their questions and concerns were addressed.    Ms. Yadav is a 68 year old woman with history of hypertension, dyslipidemia, stroke 11/2022, new onset dementia, alcohol abuse (1/2-1 bottle of wine a day) no longer dirnking (last intake 11/2022) presents after an unwitnessed fall with trauma to the front of the head on 11/17. The next day the patient was found to be confused and N/V. 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 Patient s/p EVD placement 11/18 (removed 11/23). CTA without evidence of vascular malformation. MRI HEAD 11/20/22: Left cerebellar hemorrhage with intraventricular extension and mild hydrocephalus. EEG 11/22: Moderate diffuse/multi-focal cerebral dysfunction, not specific as to etiology. There were no epileptiform abnormalities recorded x 1 day. She was discharged to [facility] on 11/29/22 and now living at home. She comes in today, referred by Dr. Franco to discuss possible cerebral angiogram. She is back to baseline and denies any interval stroke/TIA events.      Ms. Yadav is a 68 year old woman with history of hypertension, dyslipidemia, new onset dementia, alcohol abuse now 3 months s/p Left cerebellar hemorrhage, intraventricular hemorrhage, hydrocephalus S/P EVD Etiology includes uncontrolled hypertension versus vascular lesion. 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 Repeat MRI HEAD 01/22/23 showed a decrease in cerebellar hemorrhage. Plan for cerebral angiogram to rule out any underlying malformation. The procedure, risks, benefits, and alternatives were discussed with the patient and family and they are in agreement with the plan. All of their questions and concerns were addressed.     Patient was referred to Dr. Young by neurology Dr. Franco based on MRI imaging.  69 yo F patient of Dr. Young presents for PST. Pmhx hypertension, dyslipidemia, stroke 11/2022, new onset dementia, hx alcohol abuse (1/2-1 bottle of wine a day) no longer drinking (last intake 11/2022), s/p hospitalization 2/2 fall with trauma to the front of the head on 11/17, patient had confusion, N/V at the time. As per Dr. Young's reports- CTH w/cerebellar IPH with IVH in 4th/3rd vent, ventriculomegaly, she is s/p EVD placement 11/18 (removed 11/23), CTA without evidence of vascular malformation, and MRI HEAD 11/20/22 revealed Left cerebellar hemorrhage with intraventricular extension and mild hydrocephalus, EEG 11/22: Moderate diffuse/multi-focal cerebral dysfunction, not specific as to etiology. Patient was referred to Dr. Young by Dr. Franco to discuss possible cerebral angiogram for further evaluation. Cognitively she continues with memory and attention defecit per family     Ms. Yadav is a 68 year old woman with history of hypertension, dyslipidemia, new onset dementia, alcohol abuse now 3 months s/p Left cerebellar hemorrhage, intraventricular hemorrhage, hydrocephalus S/P EVD Etiology includes uncontrolled hypertension versus vascular lesion. 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 Repeat MRI HEAD 01/22/23 showed a decrease in cerebellar hemorrhage. Plan for cerebral angiogram to rule out any underlying malformation. The procedure, risks, benefits, and alternatives were discussed with the patient and family and they are in agreement with the plan. All of their questions and concerns were addressed.     Patient was referred to Dr. Young by neurology Dr. Franco based on MRI imaging.  67 yo F patient of Dr. Young presents for PST. Pmhx hypertension, dyslipidemia, stroke 11/2022, new onset dementia, hx alcohol abuse (1/2-1 bottle of wine a day) no longer drinking (last intake 11/2022), s/p Left cerebellar hemorrhage, intraventricular hemorrhage, hydrocephalus S/P EVD Etiology includes uncontrolled hypertension versus vascular lesion. She is s/p hospitalization 2/2 fall with trauma to the front of the head on 11/17, patient had confusion, N/V at the time. As per Dr. Young's reports- CTH w/cerebellar IPH with IVH in 4th/3rd vent, ventriculomegaly, she is s/p EVD placement 11/18 (removed 11/23), CTA without evidence of vascular malformation, and MRI HEAD 11/20/22 revealed Left cerebellar hemorrhage with intraventricular extension and mild hydrocephalus, EEG 11/22: Moderate diffuse/multi-focal cerebral dysfunction, not specific as to etiology. Patient was referred to Dr. Young by Dr. Franco to discuss possible cerebral angiogram for further evaluation. As per DR. Young- multiple cavernomas or other microbleeds in the cerebellum on the brain MRI, repeat MRI HEAD 01/22/23 showed a decrease in cerebellar hemorrhage, patient is recommended for cerebral angiogram to rule out any underlying malformation. Cognitively she continues with memory and attention defect per family, otherwise back to baseline, she reports feeling well today and appears in no acute distress.

## 2023-04-07 NOTE — H&P PST ADULT - NSICDXPASTMEDICALHX_GEN_ALL_CORE_FT
PAST MEDICAL HISTORY:  Cognitive impairment     HLD (hyperlipidemia)     HTN (hypertension)     Impaired functional mobility, balance, and endurance     Intracerebral hemorrhage     Left pontine stroke

## 2023-04-08 LAB
MRSA PCR RESULT.: SIGNIFICANT CHANGE UP
S AUREUS DNA NOSE QL NAA+PROBE: SIGNIFICANT CHANGE UP

## 2023-04-12 ENCOUNTER — NON-APPOINTMENT (OUTPATIENT)
Age: 68
End: 2023-04-12

## 2023-04-14 ENCOUNTER — APPOINTMENT (OUTPATIENT)
Dept: NEUROLOGY | Facility: HOSPITAL | Age: 68
End: 2023-04-14

## 2023-04-14 ENCOUNTER — TRANSCRIPTION ENCOUNTER (OUTPATIENT)
Age: 68
End: 2023-04-14

## 2023-04-14 ENCOUNTER — OUTPATIENT (OUTPATIENT)
Dept: OUTPATIENT SERVICES | Facility: HOSPITAL | Age: 68
LOS: 1 days | End: 2023-04-14
Payer: COMMERCIAL

## 2023-04-14 VITALS
HEART RATE: 60 BPM | DIASTOLIC BLOOD PRESSURE: 65 MMHG | RESPIRATION RATE: 20 BRPM | SYSTOLIC BLOOD PRESSURE: 120 MMHG | OXYGEN SATURATION: 99 %

## 2023-04-14 VITALS
OXYGEN SATURATION: 98 % | WEIGHT: 119.93 LBS | RESPIRATION RATE: 14 BRPM | DIASTOLIC BLOOD PRESSURE: 74 MMHG | SYSTOLIC BLOOD PRESSURE: 152 MMHG | HEIGHT: 61 IN | TEMPERATURE: 98 F | HEART RATE: 75 BPM

## 2023-04-14 DIAGNOSIS — Z90.710 ACQUIRED ABSENCE OF BOTH CERVIX AND UTERUS: Chronic | ICD-10-CM

## 2023-04-14 DIAGNOSIS — I61.9 NONTRAUMATIC INTRACEREBRAL HEMORRHAGE, UNSPECIFIED: ICD-10-CM

## 2023-04-14 PROCEDURE — C1769: CPT

## 2023-04-14 PROCEDURE — C1887: CPT

## 2023-04-14 PROCEDURE — 36227 PLACE CATH XTRNL CAROTID: CPT

## 2023-04-14 PROCEDURE — 36224 PLACE CATH CAROTD ART: CPT

## 2023-04-14 PROCEDURE — 36226 PLACE CATH VERTEBRAL ART: CPT

## 2023-04-14 PROCEDURE — 36226 PLACE CATH VERTEBRAL ART: CPT | Mod: 50

## 2023-04-14 PROCEDURE — 36224 PLACE CATH CAROTD ART: CPT | Mod: 50

## 2023-04-14 PROCEDURE — C1894: CPT

## 2023-04-14 PROCEDURE — 99203 OFFICE O/P NEW LOW 30 MIN: CPT

## 2023-04-14 RX ORDER — SODIUM CHLORIDE 9 MG/ML
1000 INJECTION INTRAMUSCULAR; INTRAVENOUS; SUBCUTANEOUS
Refills: 0 | Status: DISCONTINUED | OUTPATIENT
Start: 2023-04-14 | End: 2023-04-28

## 2023-04-14 RX ADMIN — SODIUM CHLORIDE 100 MILLILITER(S): 9 INJECTION INTRAMUSCULAR; INTRAVENOUS; SUBCUTANEOUS at 09:19

## 2023-04-14 NOTE — DISCHARGE NOTE NURSING/CASE MANAGEMENT/SOCIAL WORK - NSDCPEFALRISK_GEN_ALL_CORE
For information on Fall & Injury Prevention, visit: [email or web address] OR  [email or web address] OR  https://www.cdc.gov/steadi/patient.html

## 2023-04-14 NOTE — ASU DISCHARGE PLAN (ADULT/PEDIATRIC) - NS MD DC FALL RISK RISK
For information on Fall & Injury Prevention, visit: [email or web address] OR  [email or web address] OR  https://www.cdc.gov/steadi/patient.html

## 2023-04-14 NOTE — ASU DISCHARGE PLAN (ADULT/PEDIATRIC) - CARE PROVIDER_API CALL
Zeferino Young)  Neurology; Vascular Neurology  [address] [place] [id number]  Phone: [phone]  Fax: [phone]  Follow Up Time: 2 weeks

## 2023-04-14 NOTE — DISCHARGE NOTE NURSING/CASE MANAGEMENT/SOCIAL WORK - PATIENT PORTAL LINK FT
You can access the FollowMyHealth Patient Portal offered by [facility] by registering at the following website: [email or web address]. By joining [facility]’s FollowMyHealth portal, you will also be able to view your health information using other applications (apps) compatible with our system.

## 2023-04-14 NOTE — CHART NOTE - NSCHARTNOTEFT_GEN_A_CORE
Neurointerventional Surgery  Pre-Procedure Note       HPI:  68F with PMH HTN, HLD, cerebellar ICH with IVH requiring EVD who presents today for cerebral angiogram. Patient had fall with +HS in Nov 2022, and the next day she was confused, presented to the ED and was found to have L cerebellar hemorrhage along with IVH and progressive hydrocephalus requiring EVD placement. Patient had CTA at that time which showed no vascular abnormality. Patient was referred to Dr. Young for cerebral angiogram to further characterize possible source of bleeding.       Allergies: No Known Allergies      PAST MEDICAL & SURGICAL HISTORY:  HTN (hypertension)  HLD (hyperlipidemia)  Intracerebral hemorrhage  Cognitive impairment  Impaired functional mobility, balance, and endurance  Left pontine stroke  S/P hysterectomy      FAMILY HISTORY:  FH: stroke (Mother)  FH: HTN (hypertension) (Mother)  FH: hyperlipidemia (Mother, Father)  FH: HTN (hypertension) (Father)      Physical Exam:  Constitutional: NAD, lying in bed  Neuro  * Mental Status:  GCS 15: Awake, alert, oriented to conversation. No aphasia or difficulty speaking. No dysarthria. Able to name objects and their function.  * Cranial Nerves: Cnii-Cnxii grossly intact. PERRL, EOMI, tongue midline, no gaze deviation  * Motor: RUE 5/5, LUE 5/5, RLE 5/5, LLE 5/5, no drift or dysmetria  * Sensory: Sensation intact to light touch  * Reflexes: not assessed   Cardiovascular: Regular rate and rhythm.  Eyes: See neurologic examination with detailed examination of eyes.  ENT: No JVD, Trachea Midline  Respiratory: non labored breathing   Gastrointestinal: Soft, nontender, nondistended.  Genitourinary: [ ] Ann, [ x ] No Ann.   Musculoskeletal: No muscle wasting noted, (See neurologic assessment for full muscle strength assessment)   Skin:  no wounds, no redness, no abrasions noted  Hematologic / Lymph / Immunologic: No bleeding from IV sites or wounds      NIH SS:  DATE: 4/14/23  TIME:  1A: Level of consciousness (0-3): 0  1B: Questions (0-2): 0    1C: Commands (0-2): 0  2: Gaze (0-2): 0  3: Visual fields (0-3): 0  4: Facial palsy (0-3): 0  MOTOR:  5A: Left arm motor drift (0-4): 0  5B: Right arm motor drift (0-4): 0  6A: Left leg motor drift (0-4): 0  6B: Right leg motor drift (0-4): 0  7: Limb ataxia (0-2): 0  SENSORY:  8: Sensation (0-2): 0  SPEECH:  9: Language (0-3): 0  10: Dysarthria (0-2): 0  EXTINCTION:  11: Extinction/inattention (0-2): 0    TOTAL SCORE:       Labs:   4/7/23: WBC 6, Hgb 13.6, Hct 39.6, platelets 286  4/7/23: PTT 29.0, INR 0.94  4/7/23: Na 141, K 4.4, Cl 103, CO2 27, BUN 12.9, Cr 0.57, glucose 96       Assessment/Plan:   This is a 68y  year old Female  presents with hx L cerebellar hemorrhage. Patient presents to neuro-IR for selective cerebral angiography.     Procedure, goals, risks, benefits and alternatives  were discussed with patient.  All questions were answered.  Risks include but are not limited to stroke, vessel injury, hemorrhage, and or groin hematoma.  Patient demonstrates understanding  of all risks involved with this procedure and wishes to continue.   Appropriate  consent was obtained from patient and consent is in the patient's chart. Neurointerventional Surgery  Pre-Procedure Note       HPI:  68F with PMH HTN, HLD, cerebellar ICH with IVH requiring EVD who presents today for cerebral angiogram. Patient had fall with +HS in Nov 2022, and the next day she was confused, presented to the ED and was found to have L cerebellar hemorrhage along with IVH and progressive hydrocephalus requiring EVD placement. Patient had CTA at that time which showed no vascular abnormality. Patient was referred to Dr. Young for cerebral angiogram to further characterize possible source of bleeding. Patient reports she feels well this morning, has no acute complaints. Daughter admits to patient having poor memory but otherwise denies HA, weakness, numbness, tingling, dizziness, CP, SOB, N/V.       Allergies: No Known Allergies      PAST MEDICAL & SURGICAL HISTORY:  HTN (hypertension)  HLD (hyperlipidemia)  Intracerebral hemorrhage  Cognitive impairment  Impaired functional mobility, balance, and endurance  Left pontine stroke  S/P hysterectomy      FAMILY HISTORY:  FH: stroke (Mother)  FH: HTN (hypertension) (Mother)  FH: hyperlipidemia (Mother, Father)  FH: HTN (hypertension) (Father)      Physical Exam:  Constitutional: NAD, lying in bed  Neuro  * Mental Status: A&Ox2 (name, place). Intermittent confusion. Repeating questions. No aphasia. No dysarthria. Able to name objects and their function.  * Cranial Nerves: Cnii-Cnxii grossly intact. PERRL, EOMI, tongue midline, no gaze deviation  * Motor: RUE 5/5, LUE 5/5, RLE 5/5, LLE 5/5, no drift or dysmetria  * Sensory: Sensation intact to light touch  * Reflexes: not assessed   Cardiovascular: Regular rate and rhythm.  Eyes: See neurologic examination with detailed examination of eyes.  ENT: No JVD, Trachea Midline  Respiratory: non labored breathing   Gastrointestinal: Soft, nontender, nondistended.  Genitourinary: [ ] Ann, [ x ] No Ann.   Musculoskeletal: No muscle wasting noted, (See neurologic assessment for full muscle strength assessment)   Skin:  no wounds, no redness, no abrasions noted  Hematologic / Lymph / Immunologic: No bleeding from IV sites or wounds      NIH SS:  DATE: 4/14/23  TIME: 0730  1A: Level of consciousness (0-3): 0  1B: Questions (0-2): 0    1C: Commands (0-2): 0  2: Gaze (0-2): 0  3: Visual fields (0-3): 0  4: Facial palsy (0-3): 0  MOTOR:  5A: Left arm motor drift (0-4): 0  5B: Right arm motor drift (0-4): 0  6A: Left leg motor drift (0-4): 0  6B: Right leg motor drift (0-4): 0  7: Limb ataxia (0-2): 0  SENSORY:  8: Sensation (0-2): 0  SPEECH:  9: Language (0-3): 0  10: Dysarthria (0-2): 0  EXTINCTION:  11: Extinction/inattention (0-2): 0    TOTAL SCORE:       Labs:   4/7/23: WBC 6, Hgb 13.6, Hct 39.6, platelets 286  4/7/23: PTT 29.0, INR 0.94  4/7/23: Na 141, K 4.4, Cl 103, CO2 27, BUN 12.9, Cr 0.57, glucose 96       Assessment/Plan:   This is a 68y  year old Female  presents with hx L cerebellar hemorrhage. Patient presents to neuro-IR for selective cerebral angiography.     Procedure, goals, risks, benefits and alternatives  were discussed with patient.  All questions were answered.  Risks include but are not limited to stroke, vessel injury, hemorrhage, and or groin hematoma.  Patient demonstrates understanding  of all risks involved with this procedure and wishes to continue.   Appropriate  consent was obtained from patient and consent is in the patient's chart. Neurointerventional Surgery  Pre-Procedure Note       HPI:  68F with PMH HTN, HLD, cerebellar ICH with IVH requiring EVD who presents today for cerebral angiogram. Patient had fall with +HS in Nov 2022, and the next day she was confused, presented to the ED and was found to have L cerebellar hemorrhage along with IVH and progressive hydrocephalus requiring EVD placement. Patient had CTA at that time which showed no vascular abnormality. Patient was referred to Dr. Young for cerebral angiogram to further characterize possible source of bleeding. Patient reports she feels well this morning, has no acute complaints. Daughter admits to patient having poor memory but otherwise denies HA, weakness, numbness, tingling, dizziness, CP, SOB, N/V.       Allergies: No Known Allergies      PAST MEDICAL & SURGICAL HISTORY:  HTN (hypertension)  HLD (hyperlipidemia)  Intracerebral hemorrhage  Cognitive impairment  Impaired functional mobility, balance, and endurance  Left pontine stroke  S/P hysterectomy      FAMILY HISTORY:  FH: stroke (Mother)  FH: HTN (hypertension) (Mother)  FH: hyperlipidemia (Mother, Father)  FH: HTN (hypertension) (Father)      Physical Exam:  Constitutional: NAD, lying in bed  Neuro  * Mental Status: A&Ox2 (name, place). Intermittent confusion. Repeating questions. No aphasia. No dysarthria. Able to name objects and their function.  * Cranial Nerves: Cnii-Cnxii grossly intact. PERRL, EOMI, tongue midline, no gaze deviation  * Motor: RUE 5/5, LUE 5/5, RLE 5/5, LLE 5/5, no drift or dysmetria  * Sensory: Sensation intact to light touch  * Reflexes: not assessed   Cardiovascular: Regular rate and rhythm.  Eyes: See neurologic examination with detailed examination of eyes.  ENT: No JVD, Trachea Midline  Respiratory: non labored breathing   Gastrointestinal: Soft, nontender, nondistended.  Genitourinary: [ ] Ann, [ x ] No Ann.   Musculoskeletal: No muscle wasting noted, (See neurologic assessment for full muscle strength assessment)   Skin:  no wounds, no redness, no abrasions noted  Hematologic / Lymph / Immunologic: No bleeding from IV sites or wounds      NIH SS:  DATE: 4/14/23  TIME: 0730  1A: Level of consciousness (0-3): 0  1B: Questions (0-2): 0    1C: Commands (0-2): 0  2: Gaze (0-2): 0  3: Visual fields (0-3): 0  4: Facial palsy (0-3): 0  MOTOR:  5A: Left arm motor drift (0-4): 0  5B: Right arm motor drift (0-4): 0  6A: Left leg motor drift (0-4): 0  6B: Right leg motor drift (0-4): 0  7: Limb ataxia (0-2): 0  SENSORY:  8: Sensation (0-2): 0  SPEECH:  9: Language (0-3): 0  10: Dysarthria (0-2): 0  EXTINCTION:  11: Extinction/inattention (0-2): 0    TOTAL SCORE:       Labs:   4/7/23: WBC 6, Hgb 13.6, Hct 39.6, platelets 286  4/7/23: PTT 29.0, INR 0.94  4/7/23: Na 141, K 4.4, Cl 103, CO2 27, BUN 12.9, Cr 0.57, glucose 96       Assessment/Plan:   This is a 68y  year old Female  presents with hx L cerebellar hemorrhage. Patient presents to neuro-IR for selective cerebral angiography.     Procedure, goals, risks, benefits and alternatives  were discussed with patient.  All questions were answered.  Risks include but are not limited to stroke, vessel injury, hemorrhage, and or groin hematoma.  Patient demonstrates understanding  of all risks involved with this procedure and wishes to continue.   Appropriate  consent was obtained from patient and consent is in the patient's chart.  Patient was seen and examined by Dr Young and  me. History and exam as documented above by PA/NP was confirmed by me.  Agree with plan as outlined above.

## 2023-04-14 NOTE — DISCHARGE NOTE NURSING/CASE MANAGEMENT/SOCIAL WORK - NSDCVIVACCINE_GEN_ALL_CORE_FT
Pfizer-BioNTech Covid-19 Vaccine, Bivalent; 29-Nov-2022 11:57; Marlon Enciso (RN); [employer]; [id number] (Exp. Date: 12-Jan-2023); IntraMuscular; Deltoid Left.; 0.3 milliLiter(s);

## 2023-04-14 NOTE — CHART NOTE - NSCHARTNOTEFT_GEN_A_CORE
Neurointerventional Surgery Post Procedure Note    Procedure: Selective Cerebral Angiography     Pre- Procedure Diagnosis: L cerebellar IPH with IVH  Post- Procedure Diagnosis: normal cerebral angiogram    : Dr. Young   Assistant: Dr. Miranda  Physician Assistant: Holli Jimenez    Nurse: Tracy Estrella  Anesthesiologist: Dr. Traylor               Radiology Tech: Pedro Alston     Sheath:  4 Barbadian Sheath    I/Os: estimated blood loss less than 10cc,  IV fluids 250cc, Urine output 0cc, Contrast: Omnipaque 240   120cc    Vitals:  /74   HR 74  Spo2 100 %    Preliminary Report:  Under a 4 Barbadian short sheath via the right groin under MAC sedation via left vertebral artery, left internal carotid artery, left external carotid artery, right vertebral artery, right internal carotid artery, right external carotid artery, a selective cerebral angiography  was performed and reveals normal cerebral angiogram. ( Official note to follow).    Patient tolerated procedure well.  Patient remains hemodynamically stable, no change in neurological status compared to baseline.  Results were discussed with patient, patient's family and Neurosurgery.  Right groin sheath  was discontinued. Hemostasis was obtained with approximately 13 minutes of manual compression.     No active bleeding, no hematoma, no ecchymosis.   Quick clot and safeguard balloon dressing applied at 0900.  Patient transferred to recovery room in stable condition. Neurointerventional Surgery Post Procedure Note    Procedure: Selective Cerebral Angiography     Pre- Procedure Diagnosis: L cerebellar IPH with IVH  Post- Procedure Diagnosis: normal cerebral angiogram    : Dr. Young   Assistant: Dr. Miranda.  Physician Assistant: Holli Jimenez    Nurse: Tracy Estrella  Anesthesiologist: Dr. Traylor               Radiology Tech: Pedro Alston     Sheath:  4 Polish Sheath    I/Os: estimated blood loss less than 10cc,  IV fluids 250cc, Urine output 0cc, Contrast: Omnipaque 240   120cc    Vitals:  /74   HR 74  Spo2 100 %    Preliminary Report:  Under a 4 Polish short sheath via the right groin under MAC sedation via left vertebral artery, left internal carotid artery, left external carotid artery, right vertebral artery, right internal carotid artery, right external carotid artery, a selective cerebral angiography  was performed and reveals normal cerebral angiogram. ( Official note to follow).    Patient tolerated procedure well.  Patient remains hemodynamically stable, no change in neurological status compared to baseline.  Results were discussed with patient, patient's family and Neurosurgery.  Right groin sheath  was discontinued. Hemostasis was obtained with approximately 13 minutes of manual compression.     No active bleeding, no hematoma, no ecchymosis.   Quick clot and safeguard balloon dressing applied at 0900.  Patient transferred to recovery room in stable condition.

## 2023-04-17 PROBLEM — I61.9 NONTRAUMATIC INTRACEREBRAL HEMORRHAGE, UNSPECIFIED: Chronic | Status: ACTIVE | Noted: 2023-04-07

## 2023-04-17 PROBLEM — Z74.09 OTHER REDUCED MOBILITY: Chronic | Status: ACTIVE | Noted: 2023-04-07

## 2023-04-17 PROBLEM — I63.9 CEREBRAL INFARCTION, UNSPECIFIED: Chronic | Status: ACTIVE | Noted: 2023-04-07

## 2023-04-17 PROBLEM — R41.89 OTHER SYMPTOMS AND SIGNS INVOLVING COGNITIVE FUNCTIONS AND AWARENESS: Chronic | Status: ACTIVE | Noted: 2023-04-07

## 2023-04-18 ENCOUNTER — APPOINTMENT (OUTPATIENT)
Dept: NEUROLOGY | Facility: CLINIC | Age: 68
End: 2023-04-18
Payer: MEDICARE

## 2023-04-18 VITALS
SYSTOLIC BLOOD PRESSURE: 150 MMHG | HEART RATE: 84 BPM | BODY MASS INDEX: 22.08 KG/M2 | DIASTOLIC BLOOD PRESSURE: 80 MMHG | WEIGHT: 120 LBS | HEIGHT: 62 IN

## 2023-04-18 DIAGNOSIS — I61.9 NONTRAUMATIC INTRACEREBRAL HEMORRHAGE, UNSPECIFIED: ICD-10-CM

## 2023-04-18 PROCEDURE — 99215 OFFICE O/P EST HI 40 MIN: CPT

## 2023-04-18 NOTE — CONSULT LETTER
[Dear  ___] : Dear  [unfilled], [Consult Letter:] : I had the pleasure of evaluating your patient, [unfilled]. [( Thank you for referring [unfilled] for consultation for _____ )] : Thank you for referring [unfilled] for consultation for [unfilled] [Please see my note below.] : Please see my note below. [Consult Closing:] : Thank you very much for allowing me to participate in the care of this patient.  If you have any questions, please do not hesitate to contact me. [Sincerely,] : Sincerely, [FreeTextEntry3] : Zeferino Young MD\par Chief, Vascular Neurology and Neurology Service , NeuroEndovascular Surgery\par  of Neurology and Radiology\par [facility] [employer] [facility]\par Director, Comprehensive Stroke Center and Stroke Unit\par [facility]\par Director, NeuroEndovascular Surgery\par [facility]\par

## 2023-04-18 NOTE — HISTORY OF PRESENT ILLNESS
[FreeTextEntry1] : Ms. Yadav is a 68 year old woman with history of hypertension, dyslipidemia, new onset dementia, alcohol abuse (1/2-1 bottle of wine a day) presents after an unwitnessed fall with trauma to the front of the head on 11/17. The next day the patient was found to be confused and N/V. 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 Patient s/p EVD placement 11/18 (removed 11/23). CTA without evidence of vascular malformation. MRI HEAD 11/20/22: Left cerebellar hemorrhage with intraventricular extension and mild hydrocephalus. EEG 11/22: Moderate diffuse/multi-focal cerebral dysfunction, not specific as to etiology. There were no epileptiform abnormalities recorded x 1 day. She was discharged to [facility] on 11/29/22 and now living at home. She underwent cerebral angiogram 04/14/23 which was negative for any source of hemorrhage. She comes in today for a follow up visit. She is back to baseline and denies any interval TIA/Stroke symptoms.

## 2023-04-18 NOTE — REVIEW OF SYSTEMS
[Fever] : no fever [Chills] : no chills [Feeling Poorly] : not feeling poorly [Feeling Tired] : not feeling tired [Confused or Disoriented] : no confusion [Memory Lapses or Loss] : no memory loss [Decr. Concentrating Ability] : no decrease in concentrating ability [Difficulty with Language] : no ~M difficulty with language [Changed Thought Patterns] : no change in thought patterns [Repeating Questions] : no repeated questioning about recent events [Facial Weakness] : no facial weakness [Arm Weakness] : no arm weakness [Hand Weakness] : no hand weakness [Leg Weakness] : no leg weakness [Poor Coordination] : good coordination [Difficulty Writing] : no difficulty writing [Difficulties in Speech] : no speech difficulties [Numbness] : no numbness [Tingling] : no tingling [Seizures] : no convulsions [Dizziness] : no dizziness [Fainting] : no fainting [Lightheadedness] : no lightheadedness [Vertigo] : no vertigo [Tension Headache] : no tension-type headache [Difficulty Walking] : no difficulty walking [Anxiety] : no anxiety [Depression] : no depression [Eyesight Problems] : no eyesight problems [Loss Of Hearing] : no hearing loss [Chest Pain] : no chest pain [Shortness Of Breath] : no shortness of breath [Wheezing] : no wheezing [Abdominal Pain] : no abdominal pain [Vomiting] : no vomiting [Joint Pain] : no joint pain

## 2023-04-18 NOTE — PHYSICAL EXAM
[General Appearance - Alert] : alert [General Appearance - Well Developed] : well developed [General Appearance - Well Nourished] : well nourished [Oriented To Time, Place, And Person] : oriented to person, place, and time [Affect] : the affect was normal [Mood] : the mood was normal [Person] : oriented to person [Place] : oriented to place [Time] : oriented to time [Concentration Intact] : normal concentrating ability [Visual Intact] : visual attention was ~T not ~L decreased [Naming Objects] : no difficulty naming common objects [Repeating Phrases] : no difficulty repeating a phrase [Writing A Sentence] : no difficulty writing a sentence [Fluency] : fluency intact [Comprehension] : comprehension intact [Reading] : reading intact [Past History] : adequate knowledge of personal past history [Cranial Nerves Optic (II)] : visual acuity intact bilaterally,  visual fields full to confrontation, pupils equal round and reactive to light [Cranial Nerves Oculomotor (III)] : extraocular motion intact [Cranial Nerves Trigeminal (V)] : facial sensation intact symmetrically [Cranial Nerves Facial (VII)] : face symmetrical [Cranial Nerves Vestibulocochlear (VIII)] : hearing was intact bilaterally [Cranial Nerves Glossopharyngeal (IX)] : tongue and palate midline [Cranial Nerves Accessory (XI - Cranial And Spinal)] : head turning and shoulder shrug symmetric [Cranial Nerves Hypoglossal (XII)] : there was no tongue deviation with protrusion [Motor Tone] : muscle tone was normal in all four extremities [Motor Strength] : muscle strength was normal in all four extremities [No Muscle Atrophy] : normal bulk in all four extremities [Motor Handedness Right-Handed] : the patient is right hand dominant [Sensation Tactile Decrease] : light touch was intact [Balance] : balance was intact [Full Visual Field] : full visual field [Hearing Threshold Finger Rub Not Barber] : hearing was normal [Neck Appearance] : the appearance of the neck was normal [Heart Rate And Rhythm] : heart rate was normal and rhythm regular [] : no respiratory distress [Edema] : there was no peripheral edema [Abdomen Soft] : soft [Abnormal Walk] : normal gait [Paresis Pronator Drift Right-Sided] : no pronator drift on the right [Paresis Pronator Drift Left-Sided] : no pronator drift on the left [Motor Strength Upper Extremities Bilaterally] : strength was normal in both upper extremities [Motor Strength Lower Extremities Bilaterally] : strength was normal in both lower extremities

## 2023-04-18 NOTE — DISCUSSION/SUMMARY
[FreeTextEntry1] : Ms. Yadav is a 68 year old woman with history of hypertension, dyslipidemia, new onset dementia, alcohol abuse now 5 months s/p left cerebellar hemorrhage, intraventricular hemorrhage, hydrocephalus S/P EVD Etiology likely uncontrolled hypertension. 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 She is 4 days s/p cerebral angiogram which was negative for source of hemorrhage. She will now continue to follow-up with Dr. Franco for her post hemorrhage care. All of their questions and concerns were addressed.

## 2023-06-29 ENCOUNTER — APPOINTMENT (OUTPATIENT)
Dept: PHYSICAL MEDICINE AND REHAB | Facility: CLINIC | Age: 68
End: 2023-06-29
Payer: MEDICARE

## 2023-06-29 VITALS
DIASTOLIC BLOOD PRESSURE: 91 MMHG | WEIGHT: 128 LBS | TEMPERATURE: 98.1 F | BODY MASS INDEX: 23.55 KG/M2 | OXYGEN SATURATION: 96 % | HEART RATE: 82 BPM | SYSTOLIC BLOOD PRESSURE: 150 MMHG | HEIGHT: 62 IN

## 2023-06-29 PROCEDURE — 99214 OFFICE O/P EST MOD 30 MIN: CPT

## 2023-06-29 NOTE — ASSESSMENT
[FreeTextEntry1] : 69yo Female PMH HTN, dyslipidemia, new onset dementia, Etoh 1/2 - 1 bottle of wine daily, who presents from [facility] 11/18/22 s/p an unwitnessed fall with trauma to the front of the head. 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 Recent MRI also shows subacute pontine CVA. Has residual balance, and cognitive deficits\par \par 1. Discussions of different dementias including post stroke, as well as potential medications and indications discussed, possibility of aricept\par - to follow with Dr Murrieta. Currently cognitive status stable, family to call if notices deterioration prior to neuro visit. Fluctuations includin gfatigue, time of day, more confusion upon aweakening also discussed\par \par 2. Continue HEP including use compensatory memory aids. Importance of continued exercise for cognition discussed\par \par 3. Completed PT and SLP outpaitent. HEP\par \par 4. f/u 3 months after neuro visit. Plan to dc PRN basis after if stable. Patient and family agreeable

## 2023-06-29 NOTE — PHYSICAL EXAM
[FreeTextEntry1] : PE: alert grossly O x 2-3 \par follows 1-2 step commands. reduced recall\par seen ambulating both with and without cane. Good stride length, no buckling, no foot drag, improved attention, no listing or antalgic gait pattern. No LOB \par \par extL BUE and LE normal tone no tremor\par motor 5/5 bilateral shoulder elbow flexion and extensoin gross grasp\par bilateral HF, quad and ham ankle PF and DF 5/5\par \par \par neuro: EOMI\par no nysgatmgue\par no sensory deficits LT\par finger to nose intact\par \par \par months of year: 11/12 forward, 11/12 backward with self correction x 2\par immediate recall: 33 delayed 0/3, 2/3 with cues

## 2023-06-29 NOTE — HISTORY OF PRESENT ILLNESS
[FreeTextEntry1] : 69yo Female PMH HTN, dyslipidemia, new onset dementia, Etoh 1/2 - 1 bottle of wine daily, who presents from [facility] 11/18/22 s/p an unwitnessed fall with trauma to the front of the head. 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 Recent MRI also shows subacute pontine CVA. Has residual balance, and cognitive deficits. Patient was last seen 2/9/23, referred for PT and SLP at the time\par \par Since last visit, ernst has seen neurology. Repeat MRI showed decrease in hemorrhage, plan for cerebral angio to r/o AVM. She was seen again for follow up 8/18 which was negative and jimbo be followed by Dr Fuentes subsequently.\par \par \par \par

## 2023-10-13 ENCOUNTER — OUTPATIENT (OUTPATIENT)
Dept: OUTPATIENT SERVICES | Facility: HOSPITAL | Age: 68
LOS: 1 days | End: 2023-10-13
Payer: MEDICARE

## 2023-10-13 ENCOUNTER — APPOINTMENT (OUTPATIENT)
Dept: RADIOLOGY | Facility: IMAGING CENTER | Age: 68
End: 2023-10-13
Payer: MEDICARE

## 2023-10-13 ENCOUNTER — APPOINTMENT (OUTPATIENT)
Dept: MAMMOGRAPHY | Facility: IMAGING CENTER | Age: 68
End: 2023-10-13
Payer: MEDICARE

## 2023-10-13 DIAGNOSIS — Z90.710 ACQUIRED ABSENCE OF BOTH CERVIX AND UTERUS: Chronic | ICD-10-CM

## 2023-10-13 DIAGNOSIS — Z00.8 ENCOUNTER FOR OTHER GENERAL EXAMINATION: ICD-10-CM

## 2023-10-13 PROCEDURE — 77080 DXA BONE DENSITY AXIAL: CPT

## 2023-10-13 PROCEDURE — 77067 SCR MAMMO BI INCL CAD: CPT | Mod: 26

## 2023-10-13 PROCEDURE — 77063 BREAST TOMOSYNTHESIS BI: CPT | Mod: 26

## 2023-10-13 PROCEDURE — 77080 DXA BONE DENSITY AXIAL: CPT | Mod: 26

## 2023-10-13 PROCEDURE — 77063 BREAST TOMOSYNTHESIS BI: CPT

## 2023-10-13 PROCEDURE — 77067 SCR MAMMO BI INCL CAD: CPT

## 2023-11-13 NOTE — PATIENT PROFILE ADULT - OVER THE PAST TWO WEEKS HAVE YOU FELT DOWN, DEPRESSED OR HOPELESS?
From: Shante Dejesus  To: Min Mojica  Sent: 11/13/2023 3:52 PM CST  Subject: Zyrtec    Hi Dr. Mojica,   I am still having great success and relief with Xolair, no issues or hives noticed at all. I have not been taking the pepcid consistently for about a month or two. I am wondering if I could try stopping the Zyrtec?   Thanks,   Shante Dejesus   no

## 2023-11-30 ENCOUNTER — APPOINTMENT (OUTPATIENT)
Dept: PHYSICAL MEDICINE AND REHAB | Facility: CLINIC | Age: 68
End: 2023-11-30

## 2023-12-05 ENCOUNTER — APPOINTMENT (OUTPATIENT)
Dept: OBGYN | Facility: CLINIC | Age: 68
End: 2023-12-05
Payer: MEDICARE

## 2023-12-05 VITALS
SYSTOLIC BLOOD PRESSURE: 149 MMHG | DIASTOLIC BLOOD PRESSURE: 87 MMHG | WEIGHT: 128 LBS | BODY MASS INDEX: 23.55 KG/M2 | HEIGHT: 62 IN | HEART RATE: 79 BPM

## 2023-12-05 DIAGNOSIS — Z01.419 ENCOUNTER FOR GYNECOLOGICAL EXAMINATION (GENERAL) (ROUTINE) W/OUT ABNORMAL FINDINGS: ICD-10-CM

## 2023-12-05 PROCEDURE — 99387 INIT PM E/M NEW PAT 65+ YRS: CPT

## 2023-12-25 NOTE — DIETITIAN INITIAL EVALUATION ADULT - ORAL INTAKE PTA/DIET HISTORY
Per pt's daughter Marilyn, pt has had decreased appetite x ~3 years, recently eating 3 small meals/day. Typical intake includes eggs and toast for breakfast, soup for lunch, rice, chicken, and salad for dinner, with pt's  doing the cooking at home. Reported watching salt and fat intake and not using added sugar. NFKA, denies hx of chewing/swallowing difficulty and any vitamin/supplement use. PTA at [facility], pt had decreased intake x 1-2 days while at [facility]/before transfer to [facility], pt had 2 episodes of vomiting/was unable to keep food down. Per pt's daughter Marilyn, pt has had decreased appetite x ~3 years, recently eating 3 small meals/day. Typical intake includes eggs and toast for breakfast, soup for lunch, rice, chicken, and salad for dinner, with pt's  doing the cooking at home. Reported watching salt and fat intake and not using added sugar. NFKA, denies hx of chewing/swallowing difficulty and any vitamin/supplement use. PTA, pt had decreased intake x 1-2 days while at [facility]/before transfer to [facility], pt had 2 episodes of vomiting/was unable to keep food down. Improved

## 2024-12-09 ENCOUNTER — APPOINTMENT (OUTPATIENT)
Dept: CT IMAGING | Facility: IMAGING CENTER | Age: 69
End: 2024-12-09

## 2024-12-29 ENCOUNTER — OUTPATIENT (OUTPATIENT)
Dept: OUTPATIENT SERVICES | Facility: HOSPITAL | Age: 69
LOS: 1 days | End: 2024-12-29
Payer: MEDICARE

## 2024-12-29 DIAGNOSIS — Z00.8 ENCOUNTER FOR OTHER GENERAL EXAMINATION: ICD-10-CM

## 2024-12-29 DIAGNOSIS — Z90.710 ACQUIRED ABSENCE OF BOTH CERVIX AND UTERUS: Chronic | ICD-10-CM

## 2024-12-29 PROCEDURE — 74177 CT ABD & PELVIS W/CONTRAST: CPT
